# Patient Record
Sex: FEMALE | Race: WHITE | Employment: OTHER | URBAN - METROPOLITAN AREA
[De-identification: names, ages, dates, MRNs, and addresses within clinical notes are randomized per-mention and may not be internally consistent; named-entity substitution may affect disease eponyms.]

---

## 2017-01-17 ENCOUNTER — OFFICE VISIT (OUTPATIENT)
Dept: FAMILY MEDICINE CLINIC | Age: 72
End: 2017-01-17

## 2017-01-17 VITALS
WEIGHT: 136 LBS | DIASTOLIC BLOOD PRESSURE: 83 MMHG | HEIGHT: 64 IN | TEMPERATURE: 96 F | BODY MASS INDEX: 23.22 KG/M2 | OXYGEN SATURATION: 93 % | RESPIRATION RATE: 16 BRPM | HEART RATE: 96 BPM | SYSTOLIC BLOOD PRESSURE: 134 MMHG

## 2017-01-17 DIAGNOSIS — F41.9 CHRONIC ANXIETY: ICD-10-CM

## 2017-01-17 DIAGNOSIS — J44.9 CHRONIC OBSTRUCTIVE PULMONARY DISEASE, UNSPECIFIED COPD TYPE (HCC): Primary | ICD-10-CM

## 2017-01-17 RX ORDER — HYDROCODONE POLISTIREX AND CHLORPHENIRAMINE POLISTIREX 10; 8 MG/5ML; MG/5ML
5 SUSPENSION, EXTENDED RELEASE ORAL
Qty: 200 ML | Refills: 0 | Status: SHIPPED | OUTPATIENT
Start: 2017-01-17 | End: 2017-03-31 | Stop reason: SDUPTHER

## 2017-01-17 NOTE — PROGRESS NOTES
HISTORY OF PRESENT ILLNESS  Vanessa Salamanca is a 70 y.o. female. HPI  Copd stable  Chronic anxiety stable  Chronic cough stable  C/o insomnia  Review of Systems   HENT: Positive for congestion. Respiratory: Positive for cough. Psychiatric/Behavioral: The patient is nervous/anxious. All other systems reviewed and are negative. Past Medical History   Diagnosis Date    Bronchiolitis     Cough     GERD (gastroesophageal reflux disease)      went for surgery for it    Hypertension     JRA (juvenile rheumatoid arthritis) ([facility] [address]        Current Outpatient Prescriptions:     albuterol-ipratropium (DUO-NEB) 2.5 mg-0.5 mg/3 ml nebu, 3 mL by Nebulization route every six (6) hours as needed. Use 1 vial t.i.d. Via nebulizer prn, Disp: 360 Nebule, Rfl: 1    chlorpheniramine-HYDROcodone (TUSSIONEX) 10-8 mg/5 mL suspension, Take 5 mL by mouth every twelve (12) hours as needed for Cough. Max Daily Amount: 10 mL., Disp: 200 mL, Rfl: 0    chlorpheniramine-HYDROcodone (TUSSIONEX) 10-8 mg/5 mL suspension, Take 5 mL by mouth every twelve (12) hours as needed for Cough. Max Daily Amount: 10 mL., Disp: 200 mL, Rfl: 0    clonazePAM (KLONOPIN) 0.5 mg tablet, Take 1 Tab by mouth two (2) times a day. Max Daily Amount: 1 mg., Disp: 30 Tab, Rfl: 0    cholecalciferol (VITAMIN D3) 1,000 unit cap, Take  by mouth two (2) times a day., Disp: , Rfl:     LORazepam (ATIVAN) 1 mg tablet, 1 every 8 hours for breakthrough anxiety, Disp: 40 Tab, Rfl: 0    ammonium lactate (LAC-HYDRIN) 12 % lotion, rub in to affected area well twice a day as needed, Disp: 400 mL, Rfl: 3    budesonide (PULMICORT) 0.5 mg/2 mL nbsp, 2 mL by Nebulization route two (2) times a day. (Patient taking differently: 500 mcg by Nebulization route two (2) times daily as needed.), Disp: 60 Each, Rfl: 3    betamethasone dipropionate (DIPROSONE) 0.05 % topical cream, Apply  to affected area two (2) times a day.  (Patient taking differently: Apply  to affected area as needed.), Disp: 30 g, Rfl: 3    Saliva Substitution Combo No.3 spra, 1-2 sprays 3 times daily as directed, Disp: 40 mL, Rfl: 3    oxyCODONE IR (ROXICODONE) 10 mg tab immediate release tablet, Take 20 mg by mouth every four (4) hours as needed. , Disp: , Rfl:     methylPREDNISolone (MEDROL DOSEPACK) 4 mg tablet, Take as directed, Disp: 1 Dose Pack, Rfl: 0    guaiFENesin (MUCINEX) 1,200 mg Ta12 ER tablet, Take 1 Tab by mouth two (2) times a day., Disp: 20 Tab, Rfl: 0    desloratadine (CLARINEX) 5 mg tablet, Take 1 Tab by mouth daily. , Disp: 30 Tab, Rfl: 0    metoprolol tartrate (LOPRESSOR) 25 mg tablet, TAKE 1 TABLET BY MOUTH TWICE DAILY, Disp: 180 Tab, Rfl: 0    erythromycin 500 mg tablet, Take 150 mg by mouth three (3) times daily. , Disp: , Rfl:     melatonin 3 mg tablet, Take 3 mg by mouth nightly., Disp: , Rfl:     hydrOXYzine (ATARAX) 25 mg tablet, Take 1 Tab by mouth three (3) times daily as needed for Itching., Disp: 60 Tab, Rfl: 3    levothyroxine (SYNTHROID) 100 mcg tablet, Take 1 Tab by mouth Daily (before breakfast). , Disp: 90 Tab, Rfl: 3    estrogen, conjugated,-medroxyPROGESTERone (PREMPRO) 0.625-2.5 mg per tablet, Take 1 Tab by mouth daily. , Disp: 90 Tab, Rfl: 1    Calcium Carbonate-Vit D3-Min (CALTRATE 600+D PLUS MINERALS) 600 mg calcium- 400 unit Tab, Take  by mouth daily. , Disp: , Rfl:     multivitamins-minerals-lutein (CENTRUM SILVER) Tab, Take  by mouth daily. , Disp: , Rfl:   Visit Vitals    /83 (BP 1 Location: Right arm, BP Patient Position: Sitting)    Pulse 96    Temp 96 °F (35.6 °C) (Oral)    Resp 16    Ht 5' 4\" (1.626 m)    Wt 136 lb (61.7 kg)    SpO2 93%    BMI 23.34 kg/m2         Physical Exam   Constitutional: She appears well-developed and well-nourished. No distress. Pulmonary/Chest: Effort normal. No respiratory distress. She has wheezes. She has no rales. She exhibits no tenderness. Skin: She is not diaphoretic.    Psychiatric: She has a normal mood and affect. Her behavior is normal. Judgment and thought content normal.   Vitals reviewed.       ASSESSMENT and PLAN  copd   Chronic anxiety  Plan  Refills  D/w psych  Recheck in 1 month

## 2017-01-17 NOTE — PROGRESS NOTES
1. Have you been to the ER, urgent care clinic since your last visit? Hospitalized since your last visit? No   2. Have you seen or consulted any other health care providers outside of the [address] [place] [address] since your last visit? Include any pap smears or colon screening.    Yes, Dr. Nikkie Anderson hearing exam,  psychiatry  Dec 2016, upcoming ortho Feb 2017, Dr. Heather Mckeon recently

## 2017-01-17 NOTE — MR AVS SNAPSHOT
Visit Information Date & Time Provider Department Dept. Phone Encounter #  
 1/17/2017  1:30 PM Isabella Mina, 3 [facility] [phone] [id number] Follow-up Instructions Return in about 1 month (around 2/17/2017). Follow-up and Disposition History Upcoming Health Maintenance Date Due Hepatitis C Screening 1945 COLONOSCOPY 5/2/1963 DTaP/Tdap/Td series (1 - Tdap) 5/2/1966 BREAST CANCER SCRN MAMMOGRAM 5/2/1995 ZOSTER VACCINE AGE 60> 5/2/2005 GLAUCOMA SCREENING Q2Y 4/5/2014 MEDICARE YEARLY EXAM 6/24/2016 Pneumococcal 65+ Low/Medium Risk (2 of 2 - PPSV23) 10/31/2017 Allergies as of 1/17/2017  Review Complete On: 1/17/2017 By: Isabella Mina MD  
  
 Severity Noted Reaction Type Reactions Amoxicillin  08/23/2010    Unknown (comments) Avelox [Moxifloxacin]  08/23/2010    Unknown (comments) Celexa [Citalopram]  08/23/2010    Unknown (comments) Ciprofloxacin  08/23/2010    Unknown (comments) Doxycycline  01/17/2017    Nausea and Vomiting Levaquin [Levofloxacin]  08/23/2010    Unknown (comments) Paxil [Paroxetine Hcl]  08/23/2010    Unknown (comments) Sulfa (Sulfonamide Antibiotics)  08/23/2010    Unknown (comments) Joints ache Tequin [Gatifloxacin]  08/23/2010    Unknown (comments) Current Immunizations  Reviewed on 10/31/2016 Name Date Influenza High Dose Vaccine PF 10/31/2016  2:06 PM, 10/20/2015 Influenza Vaccine 10/22/2013 Influenza Vaccine (Quad) 11/24/2014 12:17 PM  
 Influenza Vaccine Split 11/16/2011 Not reviewed this visit You Were Diagnosed With   
  
 Codes Comments Chronic obstructive pulmonary disease, unspecified COPD type ([facility]    -  Primary ICD-10-CM: J44.9 ICD-9-CM: 686 Chronic anxiety     ICD-10-CM: F41.9 ICD-9-CM: 300.00 Normal body mass index (BMI)     ICD-10-CM: Z78.9 ICD-9-CM: V49.89 Vitals BP Pulse Temp Resp Height(growth percentile) Weight(growth percentile) 134/83 (BP 1 Location: Right arm, BP Patient Position: Sitting) 96 96 °F (35.6 °C) (Oral) 16 5' 4\" (1.626 m) 136 lb (61.7 kg) SpO2 BMI OB Status Smoking Status 93% 23.34 kg/m2 Postmenopausal Never Smoker Vitals History BMI and BSA Data Body Mass Index Body Surface Area  
 23.34 kg/m 2 1.67 m 2 Preferred Pharmacy Pharmacy Name Phone [facility] [address], [address] [phone] Your Updated Medication List  
  
   
This list is accurate as of: 1/17/17  1:47 PM.  Always use your most recent med list.  
  
  
  
  
 albuterol-ipratropium 2.5 mg-0.5 mg/3 ml Nebu Commonly known as:  DUO-NEB  
3 mL by Nebulization route every six (6) hours as needed. Use 1 vial t.i.d. Via nebulizer prn  
  
 ammonium lactate 12 % lotion Commonly known as:  LAC-HYDRIN  
rub in to affected area well twice a day as needed  
  
 betamethasone dipropionate 0.05 % topical cream  
Commonly known as:  Miguel Alvarez Apply  to affected area two (2) times a day. budesonide 0.5 mg/2 mL Nbsp Commonly known as:  PULMICORT  
2 mL by Nebulization route two (2) times a day. CALTRATE 600+D PLUS MINERALS 600 mg calcium- 400 unit Tab Generic drug:  Calcium Carbonate-Vit D3-Min Take  by mouth daily. CENTRUM SILVER Tab tablet Generic drug:  multivitamins-minerals-lutein Take  by mouth daily. * chlorpheniramine-HYDROcodone 10-8 mg/5 mL suspension Commonly known as:  Lisa Favorite Take 5 mL by mouth every twelve (12) hours as needed for Cough. Max Daily Amount: 10 mL. * chlorpheniramine-HYDROcodone 10-8 mg/5 mL suspension Commonly known as:  Lisa Favorite Take 5 mL by mouth every twelve (12) hours as needed for Cough. Max Daily Amount: 10 mL. clonazePAM 0.5 mg tablet Commonly known as:  Jenn Ivory  
 Take 1 Tab by mouth two (2) times a day. Max Daily Amount: 1 mg.  
  
 desloratadine 5 mg tablet Commonly known as:  CLARINEX Take 1 Tab by mouth daily. erythromycin 500 mg tablet Take 150 mg by mouth three (3) times daily. estrogen (conjugated)-medroxyPROGESTERone 0.625-2.5 mg per tablet Commonly known as:  Gib Farhan Take 1 Tab by mouth daily. guaiFENesin 1,200 mg Ta12 ER tablet Commonly known as:  [address] Take 1 Tab by mouth two (2) times a day.  
  
 hydrOXYzine HCl 25 mg tablet Commonly known as:  ATARAX Take 1 Tab by mouth three (3) times daily as needed for Itching. levothyroxine 100 mcg tablet Commonly known as:  SYNTHROID Take 1 Tab by mouth Daily (before breakfast). LORazepam 1 mg tablet Commonly known as:  ATIVAN  
1 every 8 hours for breakthrough anxiety  
  
 melatonin 3 mg tablet Take 3 mg by mouth nightly. methylPREDNISolone 4 mg tablet Commonly known as:  Dequan Labella Take as directed  
  
 metoprolol tartrate 25 mg tablet Commonly known as:  LOPRESSOR  
TAKE 1 TABLET BY MOUTH TWICE DAILY  
  
 oxyCODONE IR 10 mg Tab immediate release tablet Commonly known as:  Fina Canal Take 20 mg by mouth every four (4) hours as needed. Saliva Substitution Combo No.3 Spra 1-2 sprays 3 times daily as directed VITAMIN D3 1,000 unit Cap Generic drug:  cholecalciferol Take  by mouth two (2) times a day. * Notice: This list has 2 medication(s) that are the same as other medications prescribed for you. Read the directions carefully, and ask your doctor or other care provider to review them with you. Prescriptions Printed Refills  
 chlorpheniramine-HYDROcodone (TUSSIONEX) 10-8 mg/5 mL suspension 0 Sig: Take 5 mL by mouth every twelve (12) hours as needed for Cough. Max Daily Amount: 10 mL. Class: Print Route: Oral  
  
Follow-up Instructions Return in about 1 month (around 2/17/2017). Introducing \[facility]\"" & HEALTH SERVICES! Dear Anitra Bergman: Thank you for requesting a ImpactFlo account. Our records indicate that you already have an active ImpactFlo account. You can access your account anytime at [email or web address]. Pow[employer] Did you know that you can access your hospital and ER discharge instructions at any time in [employer]Flo? You can also review all of your test results from your hospital stay or ER visit. Additional Information If you have questions, please visit the Frequently Asked Questions section of the ImpactFlo website at [email or web address]. [employer]/. Remember, ImpactFlo is NOT to be used for urgent needs. For medical emergencies, dial 911. Now available from your iPhone and Android! Please provide this summary of care documentation to your next provider. Your primary care clinician is listed as Teresa Briggs. If you have any questions after today's visit, please call [phone].

## 2017-01-23 NOTE — TELEPHONE ENCOUNTER
Pt is requesting medication for dry mouth. She did not remember the name of the medication but states Dr. Jocelin Hikcey has previously rx for her.  Pt preferred pharmacy [facility] as [facility] [address] [facility] [facility]

## 2017-02-22 ENCOUNTER — OFFICE VISIT (OUTPATIENT)
Dept: FAMILY MEDICINE CLINIC | Age: 72
End: 2017-02-22

## 2017-02-22 VITALS
OXYGEN SATURATION: 93 % | DIASTOLIC BLOOD PRESSURE: 72 MMHG | TEMPERATURE: 97.1 F | HEART RATE: 78 BPM | WEIGHT: 132.4 LBS | RESPIRATION RATE: 20 BRPM | SYSTOLIC BLOOD PRESSURE: 121 MMHG | BODY MASS INDEX: 22.61 KG/M2 | HEIGHT: 64 IN

## 2017-02-22 DIAGNOSIS — J44.9 CHRONIC OBSTRUCTIVE PULMONARY DISEASE, UNSPECIFIED COPD TYPE (HCC): Primary | ICD-10-CM

## 2017-02-22 DIAGNOSIS — R05.3 CHRONIC COUGH: ICD-10-CM

## 2017-02-22 RX ORDER — HYDROMORPHONE HYDROCHLORIDE 5 MG/5ML
SOLUTION ORAL
Qty: 8 ML | Refills: 0 | Status: SHIPPED | OUTPATIENT
Start: 2017-02-22 | End: 2017-11-10 | Stop reason: ALTCHOICE

## 2017-02-22 NOTE — PROGRESS NOTES
Grayson Reis is a 70 y.o. female here today for 1 month follow-up        1. Have you been to the ER, urgent care clinic since your last visit? Hospitalized since your last visit? No    2. Have you seen or consulted any other health care providers outside of the [facility] since your last visit? Include any pap smears or colon screening.  Yes Where: Pyschiatrist

## 2017-02-22 NOTE — PROGRESS NOTES
HISTORY OF PRESENT ILLNESS  Milady Baron is a 70 y.o. female. HPI  Copd stable  Chronic cough stable  Due for cataract surgery  Review of Systems   Respiratory: Positive for cough. All other systems reviewed and are negative. Past Medical History:   Diagnosis Date    Bronchiolitis     Cough     GERD (gastroesophageal reflux disease)     went for surgery for it    Hypertension     JRA (juvenile rheumatoid arthritis) ([facility] [address]      Current Outpatient Prescriptions on File Prior to Visit   Medication Sig Dispense Refill    chlorpheniramine-HYDROcodone (TUSSIONEX) 10-8 mg/5 mL suspension Take 5 mL by mouth every twelve (12) hours as needed for Cough. Max Daily Amount: 10 mL. 200 mL 0    albuterol-ipratropium (DUO-NEB) 2.5 mg-0.5 mg/3 ml nebu 3 mL by Nebulization route every six (6) hours as needed. Use 1 vial t.i.d. Via nebulizer prn 360 Nebule 1    chlorpheniramine-HYDROcodone (TUSSIONEX) 10-8 mg/5 mL suspension Take 5 mL by mouth every twelve (12) hours as needed for Cough. Max Daily Amount: 10 mL. 200 mL 0    clonazePAM (KLONOPIN) 0.5 mg tablet Take 1 Tab by mouth two (2) times a day. Max Daily Amount: 1 mg. 30 Tab 0    metoprolol tartrate (LOPRESSOR) 25 mg tablet TAKE 1 TABLET BY MOUTH TWICE DAILY 180 Tab 0    cholecalciferol (VITAMIN D3) 1,000 unit cap Take  by mouth two (2) times a day.  hydrOXYzine (ATARAX) 25 mg tablet Take 1 Tab by mouth three (3) times daily as needed for Itching. 60 Tab 3    ammonium lactate (LAC-HYDRIN) 12 % lotion rub in to affected area well twice a day as needed 400 mL 3    budesonide (PULMICORT) 0.5 mg/2 mL nbsp 2 mL by Nebulization route two (2) times a day. (Patient taking differently: 500 mcg by Nebulization route two (2) times daily as needed.) 60 Each 3    levothyroxine (SYNTHROID) 100 mcg tablet Take 1 Tab by mouth Daily (before breakfast). 90 Tab 3    betamethasone dipropionate (DIPROSONE) 0.05 % topical cream Apply  to affected area two (2) times a day. (Patient taking differently: Apply  to affected area as needed.) 30 g 3    oxyCODONE IR (ROXICODONE) 10 mg tab immediate release tablet Take 20 mg by mouth every four (4) hours as needed.  Saliva Substitution Combo No.3 spra 1-2 sprays 3 times daily as directed 40 mL 3    methylPREDNISolone (MEDROL DOSEPACK) 4 mg tablet Take as directed 1 Dose Pack 0    guaiFENesin (MUCINEX) 1,200 mg Ta12 ER tablet Take 1 Tab by mouth two (2) times a day. 20 Tab 0    desloratadine (CLARINEX) 5 mg tablet Take 1 Tab by mouth daily. 30 Tab 0    erythromycin 500 mg tablet Take 150 mg by mouth three (3) times daily.  melatonin 3 mg tablet Take 3 mg by mouth nightly.  LORazepam (ATIVAN) 1 mg tablet 1 every 8 hours for breakthrough anxiety 40 Tab 0    estrogen, conjugated,-medroxyPROGESTERone (PREMPRO) 0.625-2.5 mg per tablet Take 1 Tab by mouth daily. 90 Tab 1    Calcium Carbonate-Vit D3-Min (CALTRATE 600+D PLUS MINERALS) 600 mg calcium- 400 unit Tab Take  by mouth daily.  multivitamins-minerals-lutein (CENTRUM SILVER) Tab Take  by mouth daily. No current facility-administered medications on file prior to visit. Visit Vitals    /72    Pulse 78    Temp 97.1 °F (36.2 °C)    Resp 20    Ht 5' 4\" (1.626 m)    Wt 132 lb 6.4 oz (60.1 kg)    SpO2 93%    BMI 22.73 kg/m2         Physical Exam   Constitutional: She appears well-developed and well-nourished. No distress. Pulmonary/Chest: Effort normal. No respiratory distress. She has wheezes. She has no rales. She exhibits no tenderness. Skin: She is not diaphoretic. Vitals reviewed.       ASSESSMENT and PLAN  copd   Chronic cough  Plan  Dilaudid liquid  D/w dr Clement Abreu

## 2017-02-22 NOTE — MR AVS SNAPSHOT
Visit Information Date & Time Provider Department Dept. Phone Encounter #  
 2/22/2017 11:00 AM Hemal Pires MD [address] [place] [address] [phone] [id number] Follow-up Instructions Return in about 1 month (around 3/22/2017). Follow-up and Disposition History Upcoming Health Maintenance Date Due Hepatitis C Screening 1945 COLONOSCOPY 5/2/1963 DTaP/Tdap/Td series (1 - Tdap) 5/2/1966 BREAST CANCER SCRN MAMMOGRAM 5/2/1995 ZOSTER VACCINE AGE 60> 5/2/2005 GLAUCOMA SCREENING Q2Y 4/5/2014 MEDICARE YEARLY EXAM 6/24/2016 Pneumococcal 65+ Low/Medium Risk (2 of 2 - PPSV23) 10/31/2017 Allergies as of 2/22/2017  Review Complete On: 2/22/2017 By: Hemal Pires MD  
  
 Severity Noted Reaction Type Reactions Amoxicillin  08/23/2010    Unknown (comments) Avelox [Moxifloxacin]  08/23/2010    Unknown (comments) Celexa [Citalopram]  08/23/2010    Unknown (comments) Ciprofloxacin  08/23/2010    Unknown (comments) Doxycycline  01/17/2017    Nausea and Vomiting Levaquin [Levofloxacin]  08/23/2010    Unknown (comments) Paxil [Paroxetine Hcl]  08/23/2010    Unknown (comments) Sulfa (Sulfonamide Antibiotics)  08/23/2010    Unknown (comments) Joints ache Tequin [Gatifloxacin]  08/23/2010    Unknown (comments) Current Immunizations  Reviewed on 10/31/2016 Name Date Influenza High Dose Vaccine PF 10/31/2016  2:06 PM, 10/20/2015 Influenza Vaccine 10/22/2013 Influenza Vaccine (Quad) 11/24/2014 12:17 PM  
 Influenza Vaccine Split 11/16/2011 Not reviewed this visit You Were Diagnosed With   
  
 Codes Comments Chronic obstructive pulmonary disease, unspecified [facility]    -  Primary ICD-10-CM: J44.9 ICD-9-CM: 148 BMI less than 19,adult     ICD-10-CM: Z68.1 ICD-9-CM: V85.0 Chronic cough     ICD-10-CM: R05 ICD-9-CM: 295. 2 Vitals BP  
  
  
  
  
  
 121/72 Vitals History BMI and BSA Data Body Mass Index Body Surface Area  
 22.73 kg/m 2 1.65 m 2 Preferred Pharmacy Pharmacy Name Phone [facility] [address], [address] [phone] Your Updated Medication List  
  
   
This list is accurate as of: 2/22/17 12:10 PM.  Always use your most recent med list.  
  
  
  
  
 albuterol-ipratropium 2.5 mg-0.5 mg/3 ml Nebu Commonly known as:  DUO-NEB  
3 mL by Nebulization route every six (6) hours as needed. Use 1 vial t.i.d. Via nebulizer prn  
  
 ammonium lactate 12 % lotion Commonly known as:  LAC-HYDRIN  
rub in to affected area well twice a day as needed  
  
 betamethasone dipropionate 0.05 % topical cream  
Commonly known as:  Seema Romano Apply  to affected area two (2) times a day. budesonide 0.5 mg/2 mL Nbsp Commonly known as:  PULMICORT  
2 mL by Nebulization route two (2) times a day. CALTRATE 600+D PLUS MINERALS 600 mg calcium- 400 unit Tab Generic drug:  Calcium Carbonate-Vit D3-Min Take  by mouth daily. CENTRUM SILVER Tab tablet Generic drug:  multivitamins-minerals-lutein Take  by mouth daily. * chlorpheniramine-HYDROcodone 10-8 mg/5 mL suspension Commonly known as:  Milon Choudhury Take 5 mL by mouth every twelve (12) hours as needed for Cough. Max Daily Amount: 10 mL. * chlorpheniramine-HYDROcodone 10-8 mg/5 mL suspension Commonly known as:  Milon Choudhury Take 5 mL by mouth every twelve (12) hours as needed for Cough. Max Daily Amount: 10 mL. clonazePAM 0.5 mg tablet Commonly known as:  Dillon Aw Take 1 Tab by mouth two (2) times a day. Max Daily Amount: 1 mg.  
  
 desloratadine 5 mg tablet Commonly known as:  CLARINEX Take 1 Tab by mouth daily. erythromycin 500 mg tablet Take 150 mg by mouth three (3) times daily. estrogen (conjugated)-medroxyPROGESTERone 0.625-2.5 mg per tablet Commonly known as:  Adrian Kasey Take 1 Tab by mouth daily. guaiFENesin 1,200 mg Ta12 ER tablet Commonly known as:  Deni & Deni Take 1 Tab by mouth two (2) times a day. HYDROmorphone 1 mg/mL Liqd oral solution Commonly known as:  DILAUDID  
4 ml 1 hour before surgery as directed  
  
 hydrOXYzine HCl 25 mg tablet Commonly known as:  ATARAX Take 1 Tab by mouth three (3) times daily as needed for Itching. levothyroxine 100 mcg tablet Commonly known as:  SYNTHROID Take 1 Tab by mouth Daily (before breakfast). LORazepam 1 mg tablet Commonly known as:  ATIVAN  
1 every 8 hours for breakthrough anxiety  
  
 melatonin 3 mg tablet Take 3 mg by mouth nightly. methylPREDNISolone 4 mg tablet Commonly known as:  Cleta Loss Take as directed  
  
 metoprolol tartrate 25 mg tablet Commonly known as:  LOPRESSOR  
TAKE 1 TABLET BY MOUTH TWICE DAILY  
  
 oxyCODONE IR 10 mg Tab immediate release tablet Commonly known as:  Priscilla Izabella Take 20 mg by mouth every four (4) hours as needed. Saliva Substitution Combo No.3 Spra 1-2 sprays 3 times daily as directed VITAMIN D3 1,000 unit Cap Generic drug:  cholecalciferol Take  by mouth two (2) times a day. * Notice: This list has 2 medication(s) that are the same as other medications prescribed for you. Read the directions carefully, and ask your doctor or other care provider to review them with you. Prescriptions Printed Refills HYDROmorphone (DILAUDID) 1 mg/mL liqd oral solution 0 Si ml 1 hour before surgery as directed Class: Print Follow-up Instructions Return in about 1 month (around 3/22/2017). Introducing [facility] & HEALTH SERVICES! Dear Toni Wang: Thank you for requesting a M[employer]reet account. Our records indicate that you already have an active Mogreet account. You can access your account anytime at [email or web address] [email or web address] Did you know that you can access your hospital and ER discharge instructions at any time in flaregames? You can also review all of your test results from your hospital stay or ER visit. Additional Information If you have questions, please visit the Frequently Asked Questions section of the flaregames website at [email or web address] Remember, flaregames is NOT to be used for urgent needs. For medical emergencies, dial 911. Now available from your iPhone and Android! Please provide this summary of care documentation to your next provider. Your primary care clinician is listed as Yohan Feliz. If you have any questions after today's visit, please call [phone].

## 2017-02-27 RX ORDER — HYDROCODONE POLISTIREX AND CHLORPHENIRAMINE POLISTIREX 10; 8 MG/5ML; MG/5ML
5 SUSPENSION, EXTENDED RELEASE ORAL
Qty: 200 ML | Refills: 0 | Status: SHIPPED | OUTPATIENT
Start: 2017-02-27 | End: 2017-06-05 | Stop reason: SDUPTHER

## 2017-02-28 ENCOUNTER — TELEPHONE (OUTPATIENT)
Dept: FAMILY MEDICINE CLINIC | Age: 72
End: 2017-02-28

## 2017-03-16 RX ORDER — METOPROLOL TARTRATE 25 MG/1
TABLET, FILM COATED ORAL
Qty: 180 TAB | Refills: 0 | Status: SHIPPED | OUTPATIENT
Start: 2017-03-16 | End: 2018-03-28 | Stop reason: SDUPTHER

## 2017-03-31 ENCOUNTER — TELEPHONE (OUTPATIENT)
Dept: FAMILY MEDICINE CLINIC | Age: 72
End: 2017-03-31

## 2017-03-31 DIAGNOSIS — I82.629 ARM DVT (DEEP VENOUS THROMBOEMBOLISM), ACUTE, UNSPECIFIED LATERALITY (HCC): Primary | ICD-10-CM

## 2017-03-31 LAB
INR, EXTERNAL: 2.9
PT, EXTERNAL: 0

## 2017-03-31 NOTE — TELEPHONE ENCOUNTER
Spoke with Dr. Cesar Mcmanus he states to informed them to stay on 5 mg and recheck Monday and have results faxed to office. Called and spoke with April and informed of directions. She is requesting order be faxed over to [address] [place] [address] Jennifer for them to check INR for patient. I did give her a verbal for now however will need paper order send over next week there fax # [phone] please back date or to 3/30/17 since they did test yesterday.

## 2017-03-31 NOTE — TELEPHONE ENCOUNTER
Received call back from April w/ [address] # [phone]    Patient went to ER on Monday 3/27/17 and was put on coumadin 5 mg that was started Monday 3/27/17 for blood clot on right arm at picc line, and they are not sure who is going to monitor the INR nor do they know the therapeutic  range. Yesterady Thursday 3/30/17 INR was 2.9 which she had them hold coumadin today till she found out what to do.

## 2017-03-31 NOTE — TELEPHONE ENCOUNTER
Kalee Holt from [facility] called during staff meeting and left message with answering service. Please call back at [phone] in regards to the pt.

## 2017-04-03 LAB — INR, EXTERNAL: 9

## 2017-04-03 RX ORDER — HYDROCODONE POLISTIREX AND CHLORPHENIRAMINE POLISTIREX 10; 8 MG/5ML; MG/5ML
5 SUSPENSION, EXTENDED RELEASE ORAL
Qty: 200 ML | Refills: 0 | Status: SHIPPED | OUTPATIENT
Start: 2017-04-03 | End: 2017-04-14 | Stop reason: SDUPTHER

## 2017-04-04 ENCOUNTER — TELEPHONE ANTICOAG (OUTPATIENT)
Dept: FAMILY MEDICINE CLINIC | Age: 72
End: 2017-04-04

## 2017-04-04 ENCOUNTER — TELEPHONE (OUTPATIENT)
Dept: FAMILY MEDICINE CLINIC | Age: 72
End: 2017-04-04

## 2017-04-04 LAB
INR, EXTERNAL: 6.77
INR, EXTERNAL: 9
PT, EXTERNAL: 0
PT, EXTERNAL: 77.6

## 2017-04-04 NOTE — PROGRESS NOTES
Spoke with April and told her to have patient hold coumadin until further notice and recheck  INR Friday she voiced understanding and said  wanted the INR rehecked today to confirm level . April Also wanted you to know that her creatinine has been out of range and this is also been drawn for recheck .  The patient just reported to April today that she fell Saturday nurse said she has no bruising to area and needs a written order for here to check patient's INR's fax to [phone]

## 2017-04-04 NOTE — TELEPHONE ENCOUNTER
Rachel with personal touch called in INR results. INR 6.77  Coumadin 5mg daily     Jose M Trivedi is a 70 y.o. female who presents today for Anticoagulation monitoring. Indication:   INR Goal: 2.5-3.5. Current dose:  Coumadin 5mg daily. Missed Coumadin Doses:  None  Medication Changes:  no  Dietary Changes:  no    Symptoms: taking coumadin appropriately without any bleeding. Latest INRs:  Lab Results   Component Value Date/Time    INR, External 6.77 04/04/2017 12:46 PM    INR, External 9.0 04/03/2017    INR, External 9.0 04/03/2017        New Coumadin dose:.the following changes are made - defer to dr Carmen Naranjo.

## 2017-04-04 NOTE — PROGRESS NOTES
April called with patients INR  Of 9.0 taken last night unable to  Get in contact with anyone here patient is taking 5 mg daily she missed a dose Friday because she didn't get the directions in time, also they had her hold coumadin last night patient is on vancomycin which has been decreased they will be checking INR again today to confirm level please advise on directions [phone]     Concetta Boyer is a 70 y.o. female who presents today for Anticoagulation monitoring. Indication:DVT arm near Picc line   INR Goal: 2-3  Current dose:  Coumadin 5 mg daily. Missed Coumadin Doses:  Over one week ago -  Missed Friday's dose   Medication Changes:  no  Dietary Changes:  no    Symptoms: taking coumadin appropriately without any bleeding. Latest INRs:  Lab Results   Component Value Date/Time    INR, External 9.0 04/03/2017    INR, External 9.0 04/03/2017    INR, External 2.9 03/30/2017        New Coumadin dose:.current treatment plan is effective, no change in therapy, the following changes are made  Hold until further notice .     Next check to be scheduled for 4/7/17 Friday

## 2017-04-07 ENCOUNTER — TELEPHONE (OUTPATIENT)
Dept: FAMILY MEDICINE CLINIC | Age: 72
End: 2017-04-07

## 2017-04-07 LAB — INR, EXTERNAL: 2.6

## 2017-04-07 RX ORDER — WARFARIN 1 MG/1
1 TABLET ORAL DAILY
Qty: 30 TAB | Refills: 3 | Status: SHIPPED | OUTPATIENT
Start: 2017-04-07 | End: 2017-11-10 | Stop reason: ALTCHOICE

## 2017-04-07 NOTE — TELEPHONE ENCOUNTER
Nilam Keita is a 70 y.o. female who presents today for Anticoagulation monitoring. Indication: DVT  INR Goal: 2.5-3.5. Current dose:  Not taken any since 4-2-17  Missed Coumadin Doses:  Not taken any since 4-2-17  Medication Changes:  no  Dietary Changes:  no    Symptoms: taking coumadin appropriately without any bleeding. Latest INRs:  Lab Results   Component Value Date/Time    INR, External 2.6 04/07/2017 08:36 AM    INR, External 6.77 04/04/2017 12:46 PM    INR, External 9.0 04/03/2017    INR, External 9.0 04/03/2017        New Coumadin dose:.the following changes are made - defer to dr Rachel Fleming.

## 2017-04-10 LAB
INR, EXTERNAL: 1.4
PT, EXTERNAL: 15.6

## 2017-04-11 ENCOUNTER — TELEPHONE (OUTPATIENT)
Dept: FAMILY MEDICINE CLINIC | Age: 72
End: 2017-04-11

## 2017-04-11 NOTE — TELEPHONE ENCOUNTER
Cornelia Mendez is a 70 y.o. female who presents today for Anticoagulation monitoring. Indication: DVT  INR Goal: 2.5-3.5. Current dose:  Coumadin 1mg daily. Missed Coumadin Doses:  None  Medication Changes:  no  Dietary Changes:  no    Symptoms: taking coumadin appropriately without any bleeding. Latest INRs:  Lab Results   Component Value Date/Time    INR, External 1.4 04/10/2017 11:22 AM    INR, External 2.6 04/07/2017 08:36 AM    INR, External 6.77 04/04/2017 12:46 PM        New Coumadin dose:.the following changes are made - defer to dr Fatemeh Rajan .

## 2017-04-13 LAB
CREATININE, EXTERNAL: 1.1
INR, EXTERNAL: 1.3
PT, EXTERNAL: 14.2

## 2017-04-14 ENCOUNTER — OFFICE VISIT (OUTPATIENT)
Dept: FAMILY MEDICINE CLINIC | Age: 72
End: 2017-04-14

## 2017-04-14 ENCOUNTER — TELEPHONE (OUTPATIENT)
Dept: FAMILY MEDICINE CLINIC | Age: 72
End: 2017-04-14

## 2017-04-14 VITALS
SYSTOLIC BLOOD PRESSURE: 116 MMHG | HEIGHT: 64 IN | BODY MASS INDEX: 22.2 KG/M2 | TEMPERATURE: 97.9 F | OXYGEN SATURATION: 95 % | HEART RATE: 76 BPM | WEIGHT: 130 LBS | RESPIRATION RATE: 20 BRPM | DIASTOLIC BLOOD PRESSURE: 49 MMHG

## 2017-04-14 DIAGNOSIS — R05.3 CHRONIC COUGH: ICD-10-CM

## 2017-04-14 DIAGNOSIS — I82.621 ARM DVT (DEEP VENOUS THROMBOEMBOLISM), ACUTE, RIGHT (HCC): Primary | ICD-10-CM

## 2017-04-14 RX ORDER — HYDROCODONE POLISTIREX AND CHLORPHENIRAMINE POLISTIREX 10; 8 MG/5ML; MG/5ML
5 SUSPENSION, EXTENDED RELEASE ORAL
Qty: 200 ML | Refills: 0 | Status: SHIPPED | OUTPATIENT
Start: 2017-04-14 | End: 2017-07-05 | Stop reason: SDUPTHER

## 2017-04-14 RX ORDER — VANCOMYCIN HYDROCHLORIDE 750 MG/150ML
INJECTION, SOLUTION INTRAVENOUS
COMMUNITY
End: 2017-11-10 | Stop reason: ALTCHOICE

## 2017-04-14 NOTE — MR AVS SNAPSHOT
Visit Information Date & Time Provider Department Dept. Phone Encounter #  
 4/14/2017  2:00 PM Shanice Posey MD [address] [place] [address] [phone] [id number] Follow-up Instructions Return in about 2 weeks (around 4/28/2017). Follow-up and Disposition History Upcoming Health Maintenance Date Due Hepatitis C Screening 1945 COLONOSCOPY 5/2/1963 DTaP/Tdap/Td series (1 - Tdap) 5/2/1966 BREAST CANCER SCRN MAMMOGRAM 5/2/1995 ZOSTER VACCINE AGE 60> 5/2/2005 GLAUCOMA SCREENING Q2Y 4/5/2014 MEDICARE YEARLY EXAM 6/24/2016 Pneumococcal 65+ Low/Medium Risk (2 of 2 - PPSV23) 10/31/2017 Allergies as of 4/14/2017  Review Complete On: 4/14/2017 By: Shanice Posey MD  
  
 Severity Noted Reaction Type Reactions Amoxicillin  08/23/2010    Unknown (comments) Avelox [Moxifloxacin]  08/23/2010    Unknown (comments) Celexa [Citalopram]  08/23/2010    Unknown (comments) Ciprofloxacin  08/23/2010    Unknown (comments) Doxycycline  01/17/2017    Nausea and Vomiting Levaquin [Levofloxacin]  08/23/2010    Unknown (comments) Paxil [Paroxetine Hcl]  08/23/2010    Unknown (comments) Sulfa (Sulfonamide Antibiotics)  08/23/2010    Unknown (comments) Joints ache Tequin [Gatifloxacin]  08/23/2010    Unknown (comments) Current Immunizations  Reviewed on 10/31/2016 Name Date Influenza High Dose Vaccine PF 10/31/2016  2:06 PM, 10/20/2015 Influenza Vaccine 10/22/2013 Influenza Vaccine (Quad) 11/24/2014 12:17 PM  
 Influenza Vaccine Split 11/16/2011 Not reviewed this visit You Were Diagnosed With   
  
 Codes Comments Arm DVT (deep venous thromboembolism), acute, right    -  Primary ICD-10-CM: Y77.045 ICD-9-CM: 453.82 Chronic cough     ICD-10-CM: R05 ICD-9-CM: 558. 2 Vitals BP Pulse Temp Resp Height(growth percentile) Weight(growth percentile) 116/49 (BP 1 Location: Right arm, BP Patient Position: Sitting) 76 97.9 °F (36.6 °C) (Oral) 20 5' 4\" (1.626 m) 130 lb (59 kg) SpO2 BMI OB Status Smoking Status 95% 22.31 kg/m2 Postmenopausal Never Smoker BMI and BSA Data Body Mass Index Body Surface Area  
 22.31 kg/m 2 1.63 m 2 Preferred Pharmacy Pharmacy Name Phone [facility] [address], [address] [address] [phone] Your Updated Medication List  
  
   
This list is accurate as of: 4/14/17  2:33 PM.  Always use your most recent med list.  
  
  
  
  
 albuterol-ipratropium 2.5 mg-0.5 mg/3 ml Nebu Commonly known as:  DUO-NEB  
3 mL by Nebulization route every six (6) hours as needed. Use 1 vial t.i.d. Via nebulizer prn  
  
 ammonium lactate 12 % lotion Commonly known as:  LAC-HYDRIN  
rub in to affected area well twice a day as needed  
  
 betamethasone dipropionate 0.05 % topical cream  
Commonly known as:  Sonia Pimple Apply  to affected area two (2) times a day. budesonide 0.5 mg/2 mL Nbsp Commonly known as:  PULMICORT  
2 mL by Nebulization route two (2) times a day. CALTRATE 600+D PLUS MINERALS 600 mg calcium- 400 unit Tab Generic drug:  Calcium Carbonate-Vit D3-Min Take  by mouth daily. CENTRUM SILVER Tab tablet Generic drug:  multivitamins-minerals-lutein Take  by mouth daily. * chlorpheniramine-HYDROcodone 10-8 mg/5 mL suspension Commonly known as:  Hershall Manners Take 5 mL by mouth every twelve (12) hours as needed for Cough. Max Daily Amount: 10 mL. * chlorpheniramine-HYDROcodone 10-8 mg/5 mL suspension Commonly known as:  Hershall Manners Take 5 mL by mouth every twelve (12) hours as needed for Cough. Max Daily Amount: 10 mL. clonazePAM 0.5 mg tablet Commonly known as:  Shannan Medici Take 1 Tab by mouth two (2) times a day. Max Daily Amount: 1 mg.  
  
 desloratadine 5 mg tablet Commonly known as:  CLARINEX Take 1 Tab by mouth daily. erythromycin 500 mg tablet Take 150 mg by mouth three (3) times daily. estrogen (conjugated)-medroxyPROGESTERone 0.625-2.5 mg per tablet Commonly known as:  Bushra Ates Take 1 Tab by mouth daily. guaiFENesin 1,200 mg Ta12 ER tablet Commonly known as:  Deni & Deni Take 1 Tab by mouth two (2) times a day. HYDROmorphone 1 mg/mL Liqd oral solution Commonly known as:  DILAUDID  
4 ml 1 hour before surgery as directed  
  
 hydrOXYzine HCl 25 mg tablet Commonly known as:  ATARAX Take 1 Tab by mouth three (3) times daily as needed for Itching. levothyroxine 100 mcg tablet Commonly known as:  SYNTHROID Take 1 Tab by mouth Daily (before breakfast). LORazepam 1 mg tablet Commonly known as:  ATIVAN  
1 every 8 hours for breakthrough anxiety  
  
 melatonin 3 mg tablet Take 3 mg by mouth nightly. methylPREDNISolone 4 mg tablet Commonly known as:  Kerline  Take as directed  
  
 metoprolol tartrate 25 mg tablet Commonly known as:  LOPRESSOR  
TAKE 1 TABLET BY MOUTH TWICE DAILY  
  
 OTHER Test INR as directed I82.629  
  
 oxyCODONE IR 10 mg Tab immediate release tablet Commonly known as:  Antionette Rajeev Take 20 mg by mouth every four (4) hours as needed. Saliva Substitution Combo No.3 Spra 1-2 sprays 3 times daily as directed  
  
 vancomycin in 0.9% sodium Cl 750 mg/150 mL Pgbk  
by IntraVENous route. VITAMIN D3 1,000 unit Cap Generic drug:  cholecalciferol Take  by mouth two (2) times a day. warfarin 1 mg tablet Commonly known as:  COUMADIN Take 1 Tab by mouth daily. * Notice: This list has 2 medication(s) that are the same as other medications prescribed for you. Read the directions carefully, and ask your doctor or other care provider to review them with you. Prescriptions Printed Refills chlorpheniramine-HYDROcodone (TUSSIONEX) 10-8 mg/5 mL suspension 0 Sig: Take 5 mL by mouth every twelve (12) hours as needed for Cough. Max Daily Amount: 10 mL. Class: Print Route: Oral  
  
Follow-up Instructions Return in about 2 weeks (around 4/28/2017). Introducing [facility] SERVICES! Dear Noreen Martínez: Thank you for requesting a [employer] account. Our records indicate that you already have an active [employer] account. You can access your account anytime at [email or web address] Did you know that you can access your hospital and ER discharge instructions at any time in [employer]? You can also review all of your test results from your hospital stay or ER visit. Additional Information If you have questions, please visit the Frequently Asked Questions section of the [employer] website at [email or web address] Remember, [employer] is NOT to be used for urgent needs. For medical emergencies, dial 911. Now available from your iPhone and Android! Please provide this summary of care documentation to your next provider. Your primary care clinician is listed as Angie Last. If you have any questions after today's visit, please call [phone].

## 2017-04-14 NOTE — PROGRESS NOTES
Chief Complaint   Patient presents with   [facility] Follow Up     Pain scale 0/10      1. Have you been to the ER, urgent care clinic since your last visit? Hospitalized since your last visit? Yes Where: [facility]    2. Have you seen or consulted any other health care providers outside of the [address] [place] [address] since your last visit? Include any pap smears or colon screening.  Yes Where: Orthopedics

## 2017-04-14 NOTE — PROGRESS NOTES
HISTORY OF PRESENT ILLNESS  Mikie Acevedo is a 70 y.o. female. HPI  dvt UE stable  mrsa infection stable  Review of Systems   All other systems reviewed and are negative. Past Medical History:   Diagnosis Date    Bronchiolitis     Cough     GERD (gastroesophageal reflux disease)     went for surgery for it    Hypertension     JRA (juvenile rheumatoid arthritis) ([facility] [address]        Current Outpatient Prescriptions:     VANCOMYCIN/0.9 % SOD CHLORIDE (VANCOMYCIN IN 0.9% SODIUM CL) 750 mg/150 mL pgbk, by IntraVENous route., Disp: , Rfl:     warfarin (COUMADIN) 1 mg tablet, Take 1 Tab by mouth daily. , Disp: 30 Tab, Rfl: 3    OTHER, Test INR as directed I82.629, Disp: 1 Each, Rfl: 0    metoprolol tartrate (LOPRESSOR) 25 mg tablet, TAKE 1 TABLET BY MOUTH TWICE DAILY, Disp: 180 Tab, Rfl: 0    HYDROmorphone (DILAUDID) 1 mg/mL liqd oral solution, 4 ml 1 hour before surgery as directed, Disp: 8 mL, Rfl: 0    Saliva Substitution Combo No.3 spra, 1-2 sprays 3 times daily as directed, Disp: 40 mL, Rfl: 3    methylPREDNISolone (MEDROL DOSEPACK) 4 mg tablet, Take as directed, Disp: 1 Dose Pack, Rfl: 0    albuterol-ipratropium (DUO-NEB) 2.5 mg-0.5 mg/3 ml nebu, 3 mL by Nebulization route every six (6) hours as needed. Use 1 vial t.i.d. Via nebulizer prn, Disp: 360 Nebule, Rfl: 1    guaiFENesin (MUCINEX) 1,200 mg Ta12 ER tablet, Take 1 Tab by mouth two (2) times a day., Disp: 20 Tab, Rfl: 0    desloratadine (CLARINEX) 5 mg tablet, Take 1 Tab by mouth daily. , Disp: 30 Tab, Rfl: 0    clonazePAM (KLONOPIN) 0.5 mg tablet, Take 1 Tab by mouth two (2) times a day. Max Daily Amount: 1 mg., Disp: 30 Tab, Rfl: 0    cholecalciferol (VITAMIN D3) 1,000 unit cap, Take  by mouth two (2) times a day., Disp: , Rfl:     erythromycin 500 mg tablet, Take 150 mg by mouth three (3) times daily. , Disp: , Rfl:     melatonin 3 mg tablet, Take 3 mg by mouth nightly., Disp: , Rfl:     LORazepam (ATIVAN) 1 mg tablet, 1 every 8 hours for breakthrough anxiety, Disp: 40 Tab, Rfl: 0    hydrOXYzine (ATARAX) 25 mg tablet, Take 1 Tab by mouth three (3) times daily as needed for Itching., Disp: 60 Tab, Rfl: 3    ammonium lactate (LAC-HYDRIN) 12 % lotion, rub in to affected area well twice a day as needed, Disp: 400 mL, Rfl: 3    budesonide (PULMICORT) 0.5 mg/2 mL nbsp, 2 mL by Nebulization route two (2) times a day. (Patient taking differently: 500 mcg by Nebulization route two (2) times daily as needed.), Disp: 60 Each, Rfl: 3    levothyroxine (SYNTHROID) 100 mcg tablet, Take 1 Tab by mouth Daily (before breakfast). , Disp: 90 Tab, Rfl: 3    betamethasone dipropionate (DIPROSONE) 0.05 % topical cream, Apply  to affected area two (2) times a day. (Patient taking differently: Apply  to affected area as needed.), Disp: 30 g, Rfl: 3    estrogen, conjugated,-medroxyPROGESTERone (PREMPRO) 0.625-2.5 mg per tablet, Take 1 Tab by mouth daily. , Disp: 90 Tab, Rfl: 1    oxyCODONE IR (ROXICODONE) 10 mg tab immediate release tablet, Take 20 mg by mouth every four (4) hours as needed. , Disp: , Rfl:     Calcium Carbonate-Vit D3-Min (CALTRATE 600+D PLUS MINERALS) 600 mg calcium- 400 unit Tab, Take  by mouth daily. , Disp: , Rfl:     multivitamins-minerals-lutein (CENTRUM SILVER) Tab, Take  by mouth daily. , Disp: , Rfl:     chlorpheniramine-HYDROcodone (TUSSIONEX) 10-8 mg/5 mL suspension, Take 5 mL by mouth every twelve (12) hours as needed for Cough. Max Daily Amount: 10 mL., Disp: 200 mL, Rfl: 0    chlorpheniramine-HYDROcodone (TUSSIONEX) 10-8 mg/5 mL suspension, Take 5 mL by mouth every twelve (12) hours as needed for Cough.  Max Daily Amount: 10 mL., Disp: 200 mL, Rfl: 0  Visit Vitals    /49 (BP 1 Location: Right arm, BP Patient Position: Sitting)    Pulse 76    Temp 97.9 °F (36.6 °C) (Oral)    Resp 20    Ht 5' 4\" (1.626 m)    Wt 130 lb (59 kg)    SpO2 95%    BMI 22.31 kg/m2         Physical Exam   Constitutional: She appears well-developed and well-nourished. No distress. Musculoskeletal: She exhibits tenderness and deformity. Skin: She is not diaphoretic. Vitals reviewed.       ASSESSMENT and PLAN  dvt   Plan  Continue 2 mg coumadin  Check inr  04/17  Recheck in 2 weeks

## 2017-04-17 LAB
CREATININE, EXTERNAL: 1
INR, EXTERNAL: 1.52
PT, EXTERNAL: 16.6

## 2017-04-18 ENCOUNTER — TELEPHONE (OUTPATIENT)
Dept: FAMILY MEDICINE CLINIC | Age: 72
End: 2017-04-18

## 2017-04-18 NOTE — TELEPHONE ENCOUNTER
Sylvie Rodriges is a 70 y.o. female who presents today for Anticoagulation monitoring. Indication: DVT  INR Goal: 2.5-3.5. Current dose:  Coumadin 2mg daily. Missed Coumadin Doses:  None  Medication Changes:  no  Dietary Changes:  no    Symptoms: taking coumadin appropriately without any bleeding. Latest INRs:  Lab Results   Component Value Date/Time    INR, External 1.52 04/18/2017 11:16 AM    INR, External 1.4 04/10/2017 11:22 AM    INR, External 2.6 04/07/2017 08:36 AM        New Coumadin dose:.the following changes are made - defer to dr Krystyna Henry. James Saldivar

## 2017-04-18 NOTE — TELEPHONE ENCOUNTER
A nurse named April called to report the pt's PT INR    PT: 16.67  INR 1.52    2mgs daily, no missed doses

## 2017-04-26 ENCOUNTER — TELEPHONE (OUTPATIENT)
Dept: FAMILY MEDICINE CLINIC | Age: 72
End: 2017-04-26

## 2017-04-26 LAB
INR, EXTERNAL: 2.81
PT, EXTERNAL: 31

## 2017-04-26 NOTE — TELEPHONE ENCOUNTER
Eliza Spear is a 70 y.o. female who presents today for Anticoagulation monitoring. Indication: DVT  INR Goal: 2.5-3.5. Current dose:  Coumadin 3mg daily. Missed Coumadin Doses:  None  Medication Changes:  no  Dietary Changes:  no    Symptoms: taking coumadin appropriately without any bleeding. Latest INRs:  Lab Results   Component Value Date/Time    INR, External 2.81 04/26/2017 01:33 PM    INR, External 1.52 04/18/2017 11:16 AM    INR, External 1.30 04/13/2017        New Coumadin dose:.the following changes are made - defer to Dr. Whit Stein .

## 2017-04-27 LAB — CREATININE, EXTERNAL: 0.9

## 2017-04-28 ENCOUNTER — OFFICE VISIT (OUTPATIENT)
Dept: FAMILY MEDICINE CLINIC | Age: 72
End: 2017-04-28

## 2017-04-28 VITALS
HEART RATE: 72 BPM | HEIGHT: 64 IN | OXYGEN SATURATION: 94 % | TEMPERATURE: 97.3 F | RESPIRATION RATE: 18 BRPM | DIASTOLIC BLOOD PRESSURE: 78 MMHG | SYSTOLIC BLOOD PRESSURE: 138 MMHG

## 2017-04-28 DIAGNOSIS — J44.9 CHRONIC OBSTRUCTIVE PULMONARY DISEASE, UNSPECIFIED COPD TYPE (HCC): ICD-10-CM

## 2017-04-28 DIAGNOSIS — I82.621 ARM DVT (DEEP VENOUS THROMBOEMBOLISM), ACUTE, RIGHT (HCC): ICD-10-CM

## 2017-04-28 DIAGNOSIS — M86.9: Primary | ICD-10-CM

## 2017-04-28 NOTE — PROGRESS NOTES
HISTORY OF PRESENT ILLNESS  Delores James is a 70 y.o. female. HPI  Osteomyelitis stable  Copd stable  dvt stable  Review of Systems   Respiratory: Positive for cough. All other systems reviewed and are negative. Past Medical History:   Diagnosis Date    Bronchiolitis     Cough     GERD (gastroesophageal reflux disease)     went for surgery for it    Hypertension     JRA (juvenile rheumatoid arthritis) ([facility] [address]        Current Outpatient Prescriptions:     VANCOMYCIN/0.9 % SOD CHLORIDE (VANCOMYCIN IN 0.9% SODIUM CL) 750 mg/150 mL pgbk, by IntraVENous route., Disp: , Rfl:     chlorpheniramine-HYDROcodone (TUSSIONEX) 10-8 mg/5 mL suspension, Take 5 mL by mouth every twelve (12) hours as needed for Cough. Max Daily Amount: 10 mL., Disp: 200 mL, Rfl: 0    warfarin (COUMADIN) 1 mg tablet, Take 1 Tab by mouth daily. , Disp: 30 Tab, Rfl: 3    OTHER, Test INR as directed I82.629, Disp: 1 Each, Rfl: 0    metoprolol tartrate (LOPRESSOR) 25 mg tablet, TAKE 1 TABLET BY MOUTH TWICE DAILY, Disp: 180 Tab, Rfl: 0    chlorpheniramine-HYDROcodone (TUSSIONEX) 10-8 mg/5 mL suspension, Take 5 mL by mouth every twelve (12) hours as needed for Cough. Max Daily Amount: 10 mL., Disp: 200 mL, Rfl: 0    HYDROmorphone (DILAUDID) 1 mg/mL liqd oral solution, 4 ml 1 hour before surgery as directed, Disp: 8 mL, Rfl: 0    Saliva Substitution Combo No.3 spra, 1-2 sprays 3 times daily as directed, Disp: 40 mL, Rfl: 3    methylPREDNISolone (MEDROL DOSEPACK) 4 mg tablet, Take as directed, Disp: 1 Dose Pack, Rfl: 0    albuterol-ipratropium (DUO-NEB) 2.5 mg-0.5 mg/3 ml nebu, 3 mL by Nebulization route every six (6) hours as needed. Use 1 vial t.i.d. Via nebulizer prn, Disp: 360 Nebule, Rfl: 1    guaiFENesin (MUCINEX) 1,200 mg Ta12 ER tablet, Take 1 Tab by mouth two (2) times a day., Disp: 20 Tab, Rfl: 0    desloratadine (CLARINEX) 5 mg tablet, Take 1 Tab by mouth daily. , Disp: 30 Tab, Rfl: 0    clonazePAM (KLONOPIN) 0.5 mg tablet, Take 1 Tab by mouth two (2) times a day. Max Daily Amount: 1 mg., Disp: 30 Tab, Rfl: 0    cholecalciferol (VITAMIN D3) 1,000 unit cap, Take  by mouth two (2) times a day., Disp: , Rfl:     erythromycin 500 mg tablet, Take 150 mg by mouth three (3) times daily. , Disp: , Rfl:     melatonin 3 mg tablet, Take 3 mg by mouth nightly., Disp: , Rfl:     LORazepam (ATIVAN) 1 mg tablet, 1 every 8 hours for breakthrough anxiety, Disp: 40 Tab, Rfl: 0    hydrOXYzine (ATARAX) 25 mg tablet, Take 1 Tab by mouth three (3) times daily as needed for Itching., Disp: 60 Tab, Rfl: 3    ammonium lactate (LAC-HYDRIN) 12 % lotion, rub in to affected area well twice a day as needed, Disp: 400 mL, Rfl: 3    budesonide (PULMICORT) 0.5 mg/2 mL nbsp, 2 mL by Nebulization route two (2) times a day. (Patient taking differently: 500 mcg by Nebulization route two (2) times daily as needed.), Disp: 60 Each, Rfl: 3    levothyroxine (SYNTHROID) 100 mcg tablet, Take 1 Tab by mouth Daily (before breakfast). , Disp: 90 Tab, Rfl: 3    betamethasone dipropionate (DIPROSONE) 0.05 % topical cream, Apply  to affected area two (2) times a day. (Patient taking differently: Apply  to affected area as needed.), Disp: 30 g, Rfl: 3    estrogen, conjugated,-medroxyPROGESTERone (PREMPRO) 0.625-2.5 mg per tablet, Take 1 Tab by mouth daily. , Disp: 90 Tab, Rfl: 1    oxyCODONE IR (ROXICODONE) 10 mg tab immediate release tablet, Take 20 mg by mouth every four (4) hours as needed. , Disp: , Rfl:     Calcium Carbonate-Vit D3-Min (CALTRATE 600+D PLUS MINERALS) 600 mg calcium- 400 unit Tab, Take  by mouth daily. , Disp: , Rfl:     multivitamins-minerals-lutein (CENTRUM SILVER) Tab, Take  by mouth daily. , Disp: , Rfl:   Visit Vitals    Pulse 72    Temp 97.3 °F (36.3 °C) (Oral)    Resp 18    Ht 5' 4\" (1.626 m)    SpO2 94%         Physical Exam   Constitutional: She appears well-developed and well-nourished. No distress.    Cardiovascular: Normal rate, regular rhythm, normal heart sounds and intact distal pulses. Exam reveals no gallop and no friction rub. No murmur heard. Pulmonary/Chest: Effort normal. No respiratory distress. She has wheezes. She has no rales. She exhibits no tenderness. Skin: She is not diaphoretic. Vitals reviewed.   reviewed labs    ASSESSMENT and PLAN  copd   hbp  dvt  Plan  Recheck in 1 month

## 2017-04-28 NOTE — PROGRESS NOTES
Chief Complaint   Patient presents with    Blood Clot     Pain scale 0/10        1. Have you been to the ER, urgent care clinic since your last visit? Hospitalized since your last visit? No    2. Have you seen or consulted any other health care providers outside of the [address] [place] [address] since your last visit? Include any pap smears or colon screening.  Yes Where: Ortho

## 2017-04-28 NOTE — MR AVS SNAPSHOT
Visit Information Date & Time Provider Department Dept. Phone Encounter #  
 4/28/2017 10:30 AM Shanice Posey, 3 [facility] [address] [phone] [id number] Follow-up Instructions Return in about 1 month (around 5/28/2017). Follow-up and Disposition History Upcoming Health Maintenance Date Due Hepatitis C Screening 1945 COLONOSCOPY 5/2/1963 DTaP/Tdap/Td series (1 - Tdap) 5/2/1966 BREAST CANCER SCRN MAMMOGRAM 5/2/1995 ZOSTER VACCINE AGE 60> 5/2/2005 GLAUCOMA SCREENING Q2Y 4/5/2014 MEDICARE YEARLY EXAM 6/24/2016 Pneumococcal 65+ Low/Medium Risk (2 of 2 - PPSV23) 10/31/2017 Allergies as of 4/28/2017  Review Complete On: 4/28/2017 By: Shanice Posey MD  
  
 Severity Noted Reaction Type Reactions Amoxicillin  08/23/2010    Unknown (comments) Avelox [Moxifloxacin]  08/23/2010    Unknown (comments) Celexa [Citalopram]  08/23/2010    Unknown (comments) Ciprofloxacin  08/23/2010    Unknown (comments) Doxycycline  01/17/2017    Nausea and Vomiting Levaquin [Levofloxacin]  08/23/2010    Unknown (comments) Paxil [Paroxetine Hcl]  08/23/2010    Unknown (comments) Sulfa (Sulfonamide Antibiotics)  08/23/2010    Unknown (comments) Joints ache Tequin [Gatifloxacin]  08/23/2010    Unknown (comments) Current Immunizations  Reviewed on 10/31/2016 Name Date Influenza High Dose Vaccine PF 10/31/2016  2:06 PM, 10/20/2015 Influenza Vaccine 10/22/2013 Influenza Vaccine (Quad) 11/24/2014 12:17 PM  
 Influenza Vaccine Split 11/16/2011 Not reviewed this visit You Were Diagnosed With   
  
 Codes Comments Osteomyelitis of left humerus, unspecified type    -  Primary ICD-10-CM: M86.9 ICD-9-CM: 730.22 Arm DVT (deep venous thromboembolism), acute, right     ICD-10-CM: A75.780 ICD-9-CM: 453.82 Chronic obstructive pulmonary disease, unspecified COPD type ([facility]     ICD-10-CM: J44.9 ICD-9-CM: 562 Vitals Pulse Temp Resp Height(growth percentile) SpO2 OB Status 72 97.3 °F (36.3 °C) (Oral) 18 5' 4\" (1.626 m) 94% Postmenopausal  
 Smoking Status Never Smoker Preferred Pharmacy Pharmacy Name Phone [facility] Co[facility] [address], [address] [phone] Your Updated Medication List  
  
   
This list is accurate as of: 4/28/17 10:37 AM.  Always use your most recent med list.  
  
  
  
  
 albuterol-ipratropium 2.5 mg-0.5 mg/3 ml Nebu Commonly known as:  DUO-NEB  
3 mL by Nebulization route every six (6) hours as needed. Use 1 vial t.i.d. Via nebulizer prn  
  
 ammonium lactate 12 % lotion Commonly known as:  LAC-HYDRIN  
rub in to affected area well twice a day as needed  
  
 betamethasone dipropionate 0.05 % topical cream  
Commonly known as:  Tutu Laming Apply  to affected area two (2) times a day. budesonide 0.5 mg/2 mL Nbsp Commonly known as:  PULMICORT  
2 mL by Nebulization route two (2) times a day. CALTRATE 600+D PLUS MINERALS 600 mg calcium- 400 unit Tab Generic drug:  Calcium Carbonate-Vit D3-Min Take  by mouth daily. CENTRUM SILVER Tab tablet Generic drug:  multivitamins-minerals-lutein Take  by mouth daily. * chlorpheniramine-HYDROcodone 10-8 mg/5 mL suspension Commonly known as:  Shonda Conner Take 5 mL by mouth every twelve (12) hours as needed for Cough. Max Daily Amount: 10 mL. * chlorpheniramine-HYDROcodone 10-8 mg/5 mL suspension Commonly known as:  Shonda Conner Take 5 mL by mouth every twelve (12) hours as needed for Cough. Max Daily Amount: 10 mL. clonazePAM 0.5 mg tablet Commonly known as:  Valere Idler Take 1 Tab by mouth two (2) times a day. Max Daily Amount: 1 mg.  
  
 desloratadine 5 mg tablet Commonly known as:  CLARINEX Take 1 Tab by mouth daily. erythromycin 500 mg tablet Take 150 mg by mouth three (3) times daily. estrogen (conjugated)-medroxyPROGESTERone 0.625-2.5 mg per tablet Commonly known as:  Castro Pink Take 1 Tab by mouth daily. guaiFENesin 1,200 mg Ta12 ER tablet Commonly known as:  Deni & Deni Take 1 Tab by mouth two (2) times a day. HYDROmorphone 1 mg/mL Liqd oral solution Commonly known as:  DILAUDID  
4 ml 1 hour before surgery as directed  
  
 hydrOXYzine HCl 25 mg tablet Commonly known as:  ATARAX Take 1 Tab by mouth three (3) times daily as needed for Itching. levothyroxine 100 mcg tablet Commonly known as:  SYNTHROID Take 1 Tab by mouth Daily (before breakfast). LORazepam 1 mg tablet Commonly known as:  ATIVAN  
1 every 8 hours for breakthrough anxiety  
  
 melatonin 3 mg tablet Take 3 mg by mouth nightly. methylPREDNISolone 4 mg tablet Commonly known as:  Humphrey Nightingale Take as directed  
  
 metoprolol tartrate 25 mg tablet Commonly known as:  LOPRESSOR  
TAKE 1 TABLET BY MOUTH TWICE DAILY  
  
 OTHER Test INR as directed I82.629  
  
 oxyCODONE IR 10 mg Tab immediate release tablet Commonly known as:  Malgorzata Houston Take 20 mg by mouth every four (4) hours as needed. Saliva Substitution Combo No.3 Spra 1-2 sprays 3 times daily as directed  
  
 vancomycin in 0.9% sodium Cl 750 mg/150 mL Pgbk  
by IntraVENous route. VITAMIN D3 1,000 unit Cap Generic drug:  cholecalciferol Take  by mouth two (2) times a day. warfarin 1 mg tablet Commonly known as:  COUMADIN Take 1 Tab by mouth daily. * Notice: This list has 2 medication(s) that are the same as other medications prescribed for you. Read the directions carefully, and ask your doctor or other care provider to review them with you. Follow-up Instructions Return in about 1 month (around 5/28/2017). Introducing [facility] & HEALTH SERVICES! Dear Renetta Overall: Thank you for requesting a [employer] account. Our records indicate that you already have an active [employer] account. You can access your account anytime at [email or web address] Did you know that you can access your hospital and ER discharge instructions at any time in [employer]? You can also review all of your test results from your hospital stay or ER visit. Additional Information If you have questions, please visit the Frequently Asked Questions section of the [employer] website at [email or web address] Remember, [employer] is NOT to be used for urgent needs. For medical emergencies, dial 911. Now available from your iPhone and Android! Please provide this summary of care documentation to your next provider. Your primary care clinician is listed as Bulmaro Hancock. If you have any questions after today's visit, please call [phone].

## 2017-05-01 LAB
CREATININE, EXTERNAL: 0.9
INR, EXTERNAL: 1.96
PT, EXTERNAL: 20.8

## 2017-05-08 ENCOUNTER — TELEPHONE (OUTPATIENT)
Dept: FAMILY MEDICINE CLINIC | Age: 72
End: 2017-05-08

## 2017-06-05 RX ORDER — HYDROCODONE POLISTIREX AND CHLORPHENIRAMINE POLISTIREX 10; 8 MG/5ML; MG/5ML
5 SUSPENSION, EXTENDED RELEASE ORAL
Qty: 200 ML | Refills: 0 | Status: SHIPPED | OUTPATIENT
Start: 2017-06-05 | End: 2017-09-01 | Stop reason: SDUPTHER

## 2017-06-05 NOTE — TELEPHONE ENCOUNTER
Please call Marcela Pacheco. Orders Placed This Encounter    chlorpheniramine-HYDROcodone (TUSSIONEX) 10-8 mg/5 mL suspension     Sig: Take 5 mL by mouth every twelve (12) hours as needed for Cough. Max Daily Amount: 10 mL. Dispense:  200 mL     Refill:  0     Temporary Rx, on behalf or Dr. Patricia Quinones             Reviewed . Receives oxycodone from Dr. Cali Galindo. Receives Tussionex from Dr. Patricia Quinones, 200ml Rx filled on 4/29/2017, 4/03/2017, 2/28/2017, 1/26/2017, 1/4/2017.

## 2017-06-05 NOTE — TELEPHONE ENCOUNTER
Pt stated that she needs a refill for her tussionex medication. Patient was informed that this medication usually requires an appointment. Patient then insisted that she usually just pick it up. Asking to be called back.

## 2017-06-13 ENCOUNTER — OFFICE VISIT (OUTPATIENT)
Dept: FAMILY MEDICINE CLINIC | Age: 72
End: 2017-06-13

## 2017-06-13 VITALS
HEIGHT: 64 IN | OXYGEN SATURATION: 94 % | SYSTOLIC BLOOD PRESSURE: 104 MMHG | DIASTOLIC BLOOD PRESSURE: 31 MMHG | HEART RATE: 67 BPM | RESPIRATION RATE: 18 BRPM | TEMPERATURE: 96.9 F

## 2017-06-13 DIAGNOSIS — S69.91XA HAND INJURY, RIGHT, INITIAL ENCOUNTER: ICD-10-CM

## 2017-06-13 DIAGNOSIS — H26.9 CATARACT, UNSPECIFIED CATARACT TYPE, UNSPECIFIED LATERALITY: ICD-10-CM

## 2017-06-13 DIAGNOSIS — R05.3 CHRONIC COUGH: ICD-10-CM

## 2017-06-13 DIAGNOSIS — I82.A11 ACUTE DEEP VEIN THROMBOSIS (DVT) OF AXILLARY VEIN OF RIGHT UPPER EXTREMITY (HCC): Primary | ICD-10-CM

## 2017-06-13 LAB
INR BLD: 2
PT POC: 24.1 SECONDS
VALID INTERNAL CONTROL?: YES

## 2017-06-13 NOTE — PROGRESS NOTES
HISTORY OF PRESENT ILLNESS  Richard Sloan is a 67 y.o. female. HPI  dvt RUE resolved  PICC line out  Chronic cough stable  Recent fall injured r hand  Review of Systems   Respiratory: Positive for cough. Musculoskeletal: Positive for joint pain. All other systems reviewed and are negative. >pmh  Current Outpatient Prescriptions on File Prior to Visit   Medication Sig Dispense Refill    chlorpheniramine-HYDROcodone (TUSSIONEX) 10-8 mg/5 mL suspension Take 5 mL by mouth every twelve (12) hours as needed for Cough. Max Daily Amount: 10 mL. 200 mL 0    warfarin (COUMADIN) 1 mg tablet Take 1 Tab by mouth daily. (Patient taking differently: Take 1 mg by mouth daily. Indications: patient taking 3 mg per day) 30 Tab 3    OTHER Test INR as directed  I82.629 1 Each 0    metoprolol tartrate (LOPRESSOR) 25 mg tablet TAKE 1 TABLET BY MOUTH TWICE DAILY 180 Tab 0    HYDROmorphone (DILAUDID) 1 mg/mL liqd oral solution 4 ml 1 hour before surgery as directed 8 mL 0    Saliva Substitution Combo No.3 spra 1-2 sprays 3 times daily as directed 40 mL 3    albuterol-ipratropium (DUO-NEB) 2.5 mg-0.5 mg/3 ml nebu 3 mL by Nebulization route every six (6) hours as needed. Use 1 vial t.i.d. Via nebulizer prn 360 Nebule 1    guaiFENesin (MUCINEX) 1,200 mg Ta12 ER tablet Take 1 Tab by mouth two (2) times a day. 20 Tab 0    desloratadine (CLARINEX) 5 mg tablet Take 1 Tab by mouth daily. 30 Tab 0    clonazePAM (KLONOPIN) 0.5 mg tablet Take 1 Tab by mouth two (2) times a day. Max Daily Amount: 1 mg. 30 Tab 0    cholecalciferol (VITAMIN D3) 1,000 unit cap Take  by mouth two (2) times a day.  erythromycin 500 mg tablet Take 150 mg by mouth three (3) times daily.  melatonin 3 mg tablet Take 3 mg by mouth nightly.  LORazepam (ATIVAN) 1 mg tablet 1 every 8 hours for breakthrough anxiety 40 Tab 0    hydrOXYzine (ATARAX) 25 mg tablet Take 1 Tab by mouth three (3) times daily as needed for Itching.  60 Tab 3    ammonium lactate (LAC-HYDRIN) 12 % lotion rub in to affected area well twice a day as needed 400 mL 3    budesonide (PULMICORT) 0.5 mg/2 mL nbsp 2 mL by Nebulization route two (2) times a day. (Patient taking differently: 500 mcg by Nebulization route two (2) times daily as needed.) 60 Each 3    levothyroxine (SYNTHROID) 100 mcg tablet Take 1 Tab by mouth Daily (before breakfast). 90 Tab 3    betamethasone dipropionate (DIPROSONE) 0.05 % topical cream Apply  to affected area two (2) times a day. (Patient taking differently: Apply  to affected area as needed.) 30 g 3    estrogen, conjugated,-medroxyPROGESTERone (PREMPRO) 0.625-2.5 mg per tablet Take 1 Tab by mouth daily. 90 Tab 1    oxyCODONE IR (ROXICODONE) 10 mg tab immediate release tablet Take 20 mg by mouth every four (4) hours as needed.  Calcium Carbonate-Vit D3-Min (CALTRATE 600+D PLUS MINERALS) 600 mg calcium- 400 unit Tab Take  by mouth daily.  multivitamins-minerals-lutein (CENTRUM SILVER) Tab Take  by mouth daily.  VANCOMYCIN/0.9 % SOD CHLORIDE (VANCOMYCIN IN 0.9% SODIUM CL) 750 mg/150 mL pgbk by IntraVENous route.  chlorpheniramine-HYDROcodone (TUSSIONEX) 10-8 mg/5 mL suspension Take 5 mL by mouth every twelve (12) hours as needed for Cough. Max Daily Amount: 10 mL. 200 mL 0     No current facility-administered medications on file prior to visit. Visit Vitals    BP (!) 104/31 (BP 1 Location: Right arm, BP Patient Position: Sitting)    Pulse 67    Temp 96.9 °F (36.1 °C) (Oral)    Resp 18    Ht 5' 4\" (1.626 m)    SpO2 94%         Physical Exam   Constitutional: She appears well-developed and well-nourished. No distress. Musculoskeletal: She exhibits edema, tenderness and deformity. Ecchymosis, swelling r hand   Skin: She is not diaphoretic. Vitals reviewed.     inr 2.0  xr ? djd 5th mcp  ASSESSMENT and PLAN  chronic cough   Hand injury  Plan  D/c coumadin  Refills  Recheck in 1 month

## 2017-06-13 NOTE — MR AVS SNAPSHOT
Visit Information Date & Time Provider Department Dept. Phone Encounter #  
 6/13/2017 11:15 AM Inez Kanner, [facility] [phone] [id number] Follow-up Instructions Return in about 1 month (around 7/13/2017). Follow-up and Disposition History Upcoming Health Maintenance Date Due Hepatitis C Screening 1945 COLONOSCOPY 5/2/1963 DTaP/Tdap/Td series (1 - Tdap) 5/2/1966 ZOSTER VACCINE AGE 60> 5/2/2005 GLAUCOMA SCREENING Q2Y 4/5/2014 MEDICARE YEARLY EXAM 6/24/2016 INFLUENZA AGE 9 TO ADULT 8/1/2017 Pneumococcal 65+ Low/Medium Risk (2 of 2 - PPSV23) 10/31/2017 BREAST CANCER SCRN MAMMOGRAM 6/13/2019 Allergies as of 6/13/2017  Review Complete On: 6/13/2017 By: Inez Kanner, MD  
  
 Severity Noted Reaction Type Reactions Amoxicillin  08/23/2010    Unknown (comments) Avelox [Moxifloxacin]  08/23/2010    Unknown (comments) Celexa [Citalopram]  08/23/2010    Unknown (comments) Ciprofloxacin  08/23/2010    Unknown (comments) Doxycycline  01/17/2017    Nausea and Vomiting Levaquin [Levofloxacin]  08/23/2010    Unknown (comments) Paxil [Paroxetine Hcl]  08/23/2010    Unknown (comments) Sulfa (Sulfonamide Antibiotics)  08/23/2010    Unknown (comments) Joints ache Tequin [Gatifloxacin]  08/23/2010    Unknown (comments) Current Immunizations  Reviewed on 10/31/2016 Name Date Influenza High Dose Vaccine PF 10/31/2016  2:06 PM, 10/20/2015 Influenza Vaccine 10/22/2013 Influenza Vaccine (Quad) 11/24/2014 12:17 PM  
 Influenza Vaccine Split 11/16/2011 Not reviewed this visit You Were Diagnosed With   
  
 Codes Comments Acute deep vein thrombosis (DVT) of axillary vein of right upper extremity (HCC)    -  Primary ICD-10-CM: I82. A11 
ICD-9-CM: 453.84 Hand injury, right, initial encounter     ICD-10-CM: M08.97DG ICD-9-CM: 349. 4 Chronic cough     ICD-10-CM: R05 ICD-9-CM: 687. 2 Cataract, unspecified cataract type, unspecified laterality     ICD-10-CM: H26.9 ICD-9-CM: 366.9 Vitals BP Pulse Temp Resp Height(growth percentile) SpO2  
 (!) 104/31 (BP 1 Location: Right arm, BP Patient Position: Sitting) 67 96.9 °F (36.1 °C) (Oral) 18 5' 4\" (1.626 m) 94% OB Status Smoking Status Postmenopausal Never Smoker Preferred Pharmacy Pharmacy Name Phone [facility] [address] [facility] [address], [address] [phone] Your Updated Medication List  
  
   
This list is accurate as of: 6/13/17 11:48 AM.  Always use your most recent med list.  
  
  
  
  
 albuterol-ipratropium 2.5 mg-0.5 mg/3 ml Nebu Commonly known as:  DUO-NEB  
3 mL by Nebulization route every six (6) hours as needed. Use 1 vial t.i.d. Via nebulizer prn  
  
 ammonium lactate 12 % lotion Commonly known as:  LAC-HYDRIN  
rub in to affected area well twice a day as needed  
  
 betamethasone dipropionate 0.05 % topical cream  
Commonly known as:  Jamari Lame Apply  to affected area two (2) times a day. budesonide 0.5 mg/2 mL Nbsp Commonly known as:  PULMICORT  
2 mL by Nebulization route two (2) times a day. CALTRATE 600+D PLUS MINERALS 600 mg calcium- 400 unit Tab Generic drug:  Calcium Carbonate-Vit D3-Min Take  by mouth daily. CENTRUM SILVER Tab tablet Generic drug:  multivitamins-minerals-lutein Take  by mouth daily. * chlorpheniramine-HYDROcodone 10-8 mg/5 mL suspension Commonly known as:  Amilcar Abts Take 5 mL by mouth every twelve (12) hours as needed for Cough. Max Daily Amount: 10 mL. * chlorpheniramine-HYDROcodone 10-8 mg/5 mL suspension Commonly known as:  Amilcar Abts Take 5 mL by mouth every twelve (12) hours as needed for Cough. Max Daily Amount: 10 mL. clonazePAM 0.5 mg tablet Commonly known as:  Valdez Hathaway  
 Take 1 Tab by mouth two (2) times a day. Max Daily Amount: 1 mg.  
  
 desloratadine 5 mg tablet Commonly known as:  CLARINEX Take 1 Tab by mouth daily. erythromycin 500 mg tablet Take 150 mg by mouth three (3) times daily. estrogen (conjugated)-medroxyPROGESTERone 0.625-2.5 mg per tablet Commonly known as:  Rebollar Karlie Take 1 Tab by mouth daily. guaiFENesin 1,200 mg Ta12 ER tablet Commonly known as:  [employer] Deni Take 1 Tab by mouth two (2) times a day. HYDROmorphone 1 mg/mL Liqd oral solution Commonly known as:  DILAUDID  
4 ml 1 hour before surgery as directed  
  
 hydrOXYzine HCl 25 mg tablet Commonly known as:  ATARAX Take 1 Tab by mouth three (3) times daily as needed for Itching. levothyroxine 100 mcg tablet Commonly known as:  SYNTHROID Take 1 Tab by mouth Daily (before breakfast). LORazepam 1 mg tablet Commonly known as:  ATIVAN  
1 every 8 hours for breakthrough anxiety  
  
 melatonin 3 mg tablet Take 3 mg by mouth nightly. metoprolol tartrate 25 mg tablet Commonly known as:  LOPRESSOR  
TAKE 1 TABLET BY MOUTH TWICE DAILY  
  
 OTHER Test INR as directed I82.629  
  
 oxyCODONE IR 10 mg Tab immediate release tablet Commonly known as:  Martinez Sanchez Take 20 mg by mouth every four (4) hours as needed. Saliva Substitution Combo No.3 Spra 1-2 sprays 3 times daily as directed  
  
 vancomycin in 0.9% sodium Cl 750 mg/150 mL Pgbk  
by IntraVENous route. VITAMIN D3 1,000 unit Cap Generic drug:  cholecalciferol Take  by mouth two (2) times a day. warfarin 1 mg tablet Commonly known as:  COUMADIN Take 1 Tab by mouth daily. * Notice: This list has 2 medication(s) that are the same as other medications prescribed for you. Read the directions carefully, and ask your doctor or other care provider to review them with you. We Performed the Following AMB POC PT/INR [45985 CPT(R)] REFERRAL TO OPHTHALMOLOGY [REF57 Custom] Comments:  
 Please evaluate patient for cataract. Follow-up Instructions Return in about 1 month (around 7/13/2017). To-Do List   
 06/13/2017 Imaging:  XR HAND RT MIN 3 V Referral Information Referral ID Referred By Referred To  
  
 [id number] Christopher Jacobson MD   
   [address] [place], [place] [address] Lul Hanks Phone: [phone] Fax: [phone] Visits Status Start Date End Date 1 New Request 6/13/17 6/13/18 If your referral has a status of pending review or denied, additional information will be sent to support the outcome of this decision. Introducing [facility] & HEALTH SERVICES! Dear Sreekanth Rooney: Thank you for requesting a [employer] account. Our records indicate that you already have an active [employer] account. You can access your account anytime at [email or web address] Did you know that you can access your hospital and ER discharge instructions at any time in [employer]? You can also review all of your test results from your hospital stay or ER visit. Additional Information If you have questions, please visit the Frequently Asked Questions section of the [employer] website at [email or web address] Remember, [employer] is NOT to be used for urgent needs. For medical emergencies, dial 911. Now available from your iPhone and Android! Please provide this summary of care documentation to your next provider. Your primary care clinician is listed as Danny Daly. If you have any questions after today's visit, please call [phone].

## 2017-06-13 NOTE — PROGRESS NOTES
Chief Complaint   Patient presents with    Osteomyelitis     follow up    COPD     follow up     Pain scale 0/10      1. Have you been to the ER, urgent care clinic since your last visit? Hospitalized since your last visit? No    2. Have you seen or consulted any other health care providers outside of the [address] [place] [address] since your last visit? Include any pap smears or colon screening.  Yes Where: pain management, Ortho

## 2017-06-13 NOTE — PROGRESS NOTES
Mikie Acevedo is a 67 y.o. female who presents today for Anticoagulation monitoring. Indication: DVT  INR Goal: 2.0-3.0. Current dose:  Coumadin 3mg daily. Missed Coumadin Doses:  None  Medication Changes:  no  Dietary Changes:  no    Symptoms: taking coumadin appropriately without any bleeding. Latest INRs:  Lab Results   Component Value Date/Time    INR, External 1.96 05/01/2017    INR, External 2.81 04/26/2017 01:33 PM    INR, External 1.52 04/17/2017 11:16 AM    INR POC 2.0 06/13/2017 11:31 AM        New Coumadin dose:.the following changes are made - defer to dr Ernesto Stout.

## 2017-06-16 ENCOUNTER — CLINICAL SUPPORT (OUTPATIENT)
Dept: FAMILY MEDICINE CLINIC | Age: 72
End: 2017-06-16

## 2017-06-16 DIAGNOSIS — S62.339A BOXER'S FRACTURE, CLOSED, INITIAL ENCOUNTER: Primary | ICD-10-CM

## 2017-06-16 NOTE — PROGRESS NOTES
HISTORY OF PRESENT ILLNESS  Yunior Silva is a 67 y.o. female. HPI  + fracture 5th MCP right  minimal displacement  Review of Systems   All other systems reviewed and are negative. Past Medical History:   Diagnosis Date    Bronchiolitis     Cough     GERD (gastroesophageal reflux disease)     went for surgery for it    Hypertension     JRA (juvenile rheumatoid arthritis) ([address]        Current Outpatient Prescriptions:     chlorpheniramine-HYDROcodone (TUSSIONEX) 10-8 mg/5 mL suspension, Take 5 mL by mouth every twelve (12) hours as needed for Cough. Max Daily Amount: 10 mL., Disp: 200 mL, Rfl: 0    VANCOMYCIN/0.9 % SOD CHLORIDE (VANCOMYCIN IN 0.9% SODIUM CL) 750 mg/150 mL pgbk, by IntraVENous route., Disp: , Rfl:     chlorpheniramine-HYDROcodone (TUSSIONEX) 10-8 mg/5 mL suspension, Take 5 mL by mouth every twelve (12) hours as needed for Cough. Max Daily Amount: 10 mL., Disp: 200 mL, Rfl: 0    warfarin (COUMADIN) 1 mg tablet, Take 1 Tab by mouth daily. (Patient taking differently: Take 1 mg by mouth daily. Indications: patient taking 3 mg per day), Disp: 30 Tab, Rfl: 3    OTHER, Test INR as directed I82.629, Disp: 1 Each, Rfl: 0    metoprolol tartrate (LOPRESSOR) 25 mg tablet, TAKE 1 TABLET BY MOUTH TWICE DAILY, Disp: 180 Tab, Rfl: 0    HYDROmorphone (DILAUDID) 1 mg/mL liqd oral solution, 4 ml 1 hour before surgery as directed, Disp: 8 mL, Rfl: 0    Saliva Substitution Combo No.3 spra, 1-2 sprays 3 times daily as directed, Disp: 40 mL, Rfl: 3    albuterol-ipratropium (DUO-NEB) 2.5 mg-0.5 mg/3 ml nebu, 3 mL by Nebulization route every six (6) hours as needed. Use 1 vial t.i.d. Via nebulizer prn, Disp: 360 Nebule, Rfl: 1    guaiFENesin (MUCINEX) 1,200 mg Ta12 ER tablet, Take 1 Tab by mouth two (2) times a day., Disp: 20 Tab, Rfl: 0    desloratadine (CLARINEX) 5 mg tablet, Take 1 Tab by mouth daily. , Disp: 30 Tab, Rfl: 0    clonazePAM (KLONOPIN) 0.5 mg tablet, Take 1 Tab by mouth two (2) times a day. Max Daily Amount: 1 mg., Disp: 30 Tab, Rfl: 0    cholecalciferol (VITAMIN D3) 1,000 unit cap, Take  by mouth two (2) times a day., Disp: , Rfl:     erythromycin 500 mg tablet, Take 150 mg by mouth three (3) times daily. , Disp: , Rfl:     melatonin 3 mg tablet, Take 3 mg by mouth nightly., Disp: , Rfl:     LORazepam (ATIVAN) 1 mg tablet, 1 every 8 hours for breakthrough anxiety, Disp: 40 Tab, Rfl: 0    hydrOXYzine (ATARAX) 25 mg tablet, Take 1 Tab by mouth three (3) times daily as needed for Itching., Disp: 60 Tab, Rfl: 3    ammonium lactate (LAC-HYDRIN) 12 % lotion, rub in to affected area well twice a day as needed, Disp: 400 mL, Rfl: 3    budesonide (PULMICORT) 0.5 mg/2 mL nbsp, 2 mL by Nebulization route two (2) times a day. (Patient taking differently: 500 mcg by Nebulization route two (2) times daily as needed.), Disp: 60 Each, Rfl: 3    levothyroxine (SYNTHROID) 100 mcg tablet, Take 1 Tab by mouth Daily (before breakfast). , Disp: 90 Tab, Rfl: 3    betamethasone dipropionate (DIPROSONE) 0.05 % topical cream, Apply  to affected area two (2) times a day. (Patient taking differently: Apply  to affected area as needed.), Disp: 30 g, Rfl: 3    estrogen, conjugated,-medroxyPROGESTERone (PREMPRO) 0.625-2.5 mg per tablet, Take 1 Tab by mouth daily. , Disp: 90 Tab, Rfl: 1    oxyCODONE IR (ROXICODONE) 10 mg tab immediate release tablet, Take 20 mg by mouth every four (4) hours as needed. , Disp: , Rfl:     Calcium Carbonate-Vit D3-Min (CALTRATE 600+D PLUS MINERALS) 600 mg calcium- 400 unit Tab, Take  by mouth daily. , Disp: , Rfl:     multivitamins-minerals-lutein (CENTRUM SILVER) Tab, Take  by mouth daily. , Disp: , Rfl:   There were no vitals taken for this visit. Physical Exam   Constitutional: She appears well-developed and well-nourished. No distress. Musculoskeletal: Normal range of motion. She exhibits edema and deformity. She exhibits no tenderness.    ecchymosis   Skin: She is not diaphoretic. Vitals reviewed.     Spica splint applied  ASSESSMENT and PLAN  mcp fracture   Plan  Recheck as planned

## 2017-06-16 NOTE — MR AVS SNAPSHOT
Visit Information Date & Time Provider Department Dept. Phone Encounter #  
 6/16/2017  1:30 PM Inez Kanner, [facility] [phone] [id number] Follow-up Instructions Return as planned. Follow-up and Disposition History Your Appointments 7/12/2017  2:30 PM  
Follow Up with Inez Kanner, MD  
3 [facility]) Appt Note: 1 month f/u  
 [address] [facility] [address] [facility] [id number] [phone]  
  
   
 [address] Upcoming Health Maintenance Date Due Hepatitis C Screening 1945 COLONOSCOPY 5/2/1963 DTaP/Tdap/Td series (1 - Tdap) 5/2/1966 ZOSTER VACCINE AGE 60> 5/2/2005 GLAUCOMA SCREENING Q2Y 4/5/2014 MEDICARE YEARLY EXAM 6/24/2016 INFLUENZA AGE 9 TO ADULT 8/1/2017 Pneumococcal 65+ Low/Medium Risk (2 of 2 - PPSV23) 10/31/2017 BREAST CANCER SCRN MAMMOGRAM 6/13/2019 Allergies as of 6/16/2017  Review Complete On: 6/16/2017 By: Inez Kanner, MD  
  
 Severity Noted Reaction Type Reactions Amoxicillin  08/23/2010    Unknown (comments) Avelox [Moxifloxacin]  08/23/2010    Unknown (comments) Celexa [Citalopram]  08/23/2010    Unknown (comments) Ciprofloxacin  08/23/2010    Unknown (comments) Doxycycline  01/17/2017    Nausea and Vomiting Levaquin [Levofloxacin]  08/23/2010    Unknown (comments) Paxil [Paroxetine Hcl]  08/23/2010    Unknown (comments) Sulfa (Sulfonamide Antibiotics)  08/23/2010    Unknown (comments) Joints ache Tequin [Gatifloxacin]  08/23/2010    Unknown (comments) Current Immunizations  Reviewed on 10/31/2016 Name Date Influenza High Dose Vaccine PF 10/31/2016  2:06 PM, 10/20/2015 Influenza Vaccine 10/22/2013 Influenza Vaccine (Quad) 11/24/2014 12:17 PM  
 Influenza Vaccine Split 11/16/2011 Not reviewed this visit You Were Diagnosed With   
 Codes Comments Boxjs's fracture, closed, initial encounter    -  Primary ICD-10-CM: A84.847Y ICD-9-CM: 815.00 Vitals OB Status Smoking Status Postmenopausal Never Smoker Preferred Pharmacy Pharmacy Name Phone [facility] [address] [facility], [address] [phone] Your Updated Medication List  
  
   
This list is accurate as of: 6/16/17  2:04 PM.  Always use your most recent med list.  
  
  
  
  
 albuterol-ipratropium 2.5 mg-0.5 mg/3 ml Nebu Commonly known as:  DUO-NEB  
3 mL by Nebulization route every six (6) hours as needed. Use 1 vial t.i.d. Via nebulizer prn  
  
 ammonium lactate 12 % lotion Commonly known as:  LAC-HYDRIN  
rub in to affected area well twice a day as needed  
  
 betamethasone dipropionate 0.05 % topical cream  
Commonly known as:  Lisa Reamer Apply  to affected area two (2) times a day. budesonide 0.5 mg/2 mL Nbsp Commonly known as:  PULMICORT  
2 mL by Nebulization route two (2) times a day. CALTRATE 600+D PLUS MINERALS 600 mg calcium- 400 unit Tab Generic drug:  Calcium Carbonate-Vit D3-Min Take  by mouth daily. CENTRUM SILVER Tab tablet Generic drug:  multivitamins-minerals-lutein Take  by mouth daily. * chlorpheniramine-HYDROcodone 10-8 mg/5 mL suspension Commonly known as:  [place] Nestle Take 5 mL by mouth every twelve (12) hours as needed for Cough. Max Daily Amount: 10 mL. * chlorpheniramine-HYDROcodone 10-8 mg/5 mL suspension Commonly known as:  [place] Nestle Take 5 mL by mouth every twelve (12) hours as needed for Cough. Max Daily Amount: 10 mL. clonazePAM 0.5 mg tablet Commonly known as:  Gene Pepito Take 1 Tab by mouth two (2) times a day. Max Daily Amount: 1 mg.  
  
 desloratadine 5 mg tablet Commonly known as:  CLARINEX Take 1 Tab by mouth daily. erythromycin 500 mg tablet Take 150 mg by mouth three (3) times daily. estrogen (conjugated)-medroxyPROGESTERone 0.625-2.5 mg per tablet Commonly known as:  Dulcie Peals Take 1 Tab by mouth daily. guaiFENesin 1,200 mg Ta12 ER tablet Commonly known as:  Deni & Deni Take 1 Tab by mouth two (2) times a day. HYDROmorphone 1 mg/mL Liqd oral solution Commonly known as:  DILAUDID  
4 ml 1 hour before surgery as directed  
  
 hydrOXYzine HCl 25 mg tablet Commonly known as:  ATARAX Take 1 Tab by mouth three (3) times daily as needed for Itching. levothyroxine 100 mcg tablet Commonly known as:  SYNTHROID Take 1 Tab by mouth Daily (before breakfast). LORazepam 1 mg tablet Commonly known as:  ATIVAN  
1 every 8 hours for breakthrough anxiety  
  
 melatonin 3 mg tablet Take 3 mg by mouth nightly. metoprolol tartrate 25 mg tablet Commonly known as:  LOPRESSOR  
TAKE 1 TABLET BY MOUTH TWICE DAILY  
  
 OTHER Test INR as directed I82.629  
  
 oxyCODONE IR 10 mg Tab immediate release tablet Commonly known as:  Mal Longs Take 20 mg by mouth every four (4) hours as needed. Saliva Substitution Combo No.3 Spra 1-2 sprays 3 times daily as directed  
  
 vancomycin in 0.9% sodium Cl 750 mg/150 mL Pgbk  
by IntraVENous route. VITAMIN D3 1,000 unit Cap Generic drug:  cholecalciferol Take  by mouth two (2) times a day. warfarin 1 mg tablet Commonly known as:  COUMADIN Take 1 Tab by mouth daily. * Notice: This list has 2 medication(s) that are the same as other medications prescribed for you. Read the directions carefully, and ask your doctor or other care provider to review them with you. Follow-up Instructions Return as planned. Introducing [facility]! Dear Sreekanth Rooney: Thank you for requesting a INFOGRAPHIQS account. Our records indicate that you already have an active INFOGRAPHIQS account.   You can access your account anytime at [email or web address] Did you know that you can access your hospital and ER discharge instructions at any time in ZeroMail? You can also review all of your test results from your hospital stay or ER visit. Additional Information If you have questions, please visit the Frequently Asked Questions section of the ZeroMail website at [email or web address] Remember, ZeroMail is NOT to be used for urgent needs. For medical emergencies, dial 911. Now available from your iPhone and Android! Please provide this summary of care documentation to your next provider. Your primary care clinician is listed as Mirna Alvarado. If you have any questions after today's visit, please call [phone].

## 2017-07-05 ENCOUNTER — TELEPHONE (OUTPATIENT)
Dept: FAMILY MEDICINE CLINIC | Age: 72
End: 2017-07-05

## 2017-07-05 RX ORDER — HYDROCODONE POLISTIREX AND CHLORPHENIRAMINE POLISTIREX 10; 8 MG/5ML; MG/5ML
5 SUSPENSION, EXTENDED RELEASE ORAL
Qty: 200 ML | Refills: 0 | Status: SHIPPED | OUTPATIENT
Start: 2017-07-05 | End: 2017-07-12 | Stop reason: SDUPTHER

## 2017-07-12 ENCOUNTER — OFFICE VISIT (OUTPATIENT)
Dept: FAMILY MEDICINE CLINIC | Age: 72
End: 2017-07-12

## 2017-07-12 VITALS
HEART RATE: 63 BPM | RESPIRATION RATE: 18 BRPM | DIASTOLIC BLOOD PRESSURE: 62 MMHG | SYSTOLIC BLOOD PRESSURE: 111 MMHG | TEMPERATURE: 97.9 F | OXYGEN SATURATION: 96 % | HEIGHT: 64 IN

## 2017-07-12 DIAGNOSIS — Z01.818 PREOP EXAMINATION: ICD-10-CM

## 2017-07-12 DIAGNOSIS — J44.9 CHRONIC OBSTRUCTIVE PULMONARY DISEASE, UNSPECIFIED COPD TYPE (HCC): Primary | ICD-10-CM

## 2017-07-12 DIAGNOSIS — R05.3 CHRONIC COUGH: ICD-10-CM

## 2017-07-12 RX ORDER — HYDROCODONE POLISTIREX AND CHLORPHENIRAMINE POLISTIREX 10; 8 MG/5ML; MG/5ML
5 SUSPENSION, EXTENDED RELEASE ORAL
Qty: 200 ML | Refills: 0 | Status: SHIPPED | OUTPATIENT
Start: 2017-07-12 | End: 2017-11-10 | Stop reason: ALTCHOICE

## 2017-07-12 NOTE — PROGRESS NOTES
Reena Dan is a 67 y.o. female is here for pre op, they have the paperwork with them. 1. Have you been to the ER, urgent care clinic since your last visit? Hospitalized since your last visit? No    2. Have you seen or consulted any other health care providers outside of the [address] [place] [address] since your last visit? Include any pap smears or colon screening.  No     Health Maintenance Due   Topic Date Due    Hepatitis C Screening  1945    COLONOSCOPY  05/02/1963    DTaP/Tdap/Td series (1 - Tdap) 05/02/1966    ZOSTER VACCINE AGE 60>  05/02/2005    MEDICARE YEARLY EXAM  06/24/2016

## 2017-07-12 NOTE — MR AVS SNAPSHOT
Visit Information Date & Time Provider Department Dept. Phone Encounter #  
 7/12/2017  2:30 PM Kortney Cyr MD 3 [facility] [phone] [id number] Follow-up Instructions Return to be determined. Follow-up and Disposition History Upcoming Health Maintenance Date Due Hepatitis C Screening 1945 COLONOSCOPY 5/2/1963 DTaP/Tdap/Td series (1 - Tdap) 5/2/1966 ZOSTER VACCINE AGE 60> 5/2/2005 MEDICARE YEARLY EXAM 6/24/2016 INFLUENZA AGE 9 TO ADULT 8/1/2017 Pneumococcal 65+ Low/Medium Risk (2 of 2 - PPSV23) 10/31/2017 BREAST CANCER SCRN MAMMOGRAM 6/13/2019 GLAUCOMA SCREENING Q2Y 6/26/2019 Allergies as of 7/12/2017  Review Complete On: 7/12/2017 By: Kortney Cyr MD  
  
 Severity Noted Reaction Type Reactions Amoxicillin  08/23/2010    Unknown (comments) Avelox [Moxifloxacin]  08/23/2010    Unknown (comments) Celexa [Citalopram]  08/23/2010    Unknown (comments) Ciprofloxacin  08/23/2010    Unknown (comments) Doxycycline  01/17/2017    Nausea and Vomiting Levaquin [Levofloxacin]  08/23/2010    Unknown (comments) Paxil [Paroxetine Hcl]  08/23/2010    Unknown (comments) Sulfa (Sulfonamide Antibiotics)  08/23/2010    Unknown (comments) Joints ache Tequin [Gatifloxacin]  08/23/2010    Unknown (comments) Current Immunizations  Reviewed on 10/31/2016 Name Date Influenza High Dose Vaccine PF 10/31/2016  2:06 PM, 10/20/2015 Influenza Vaccine 10/22/2013 Influenza Vaccine (Quad) 11/24/2014 12:17 PM  
 Influenza Vaccine Split 11/16/2011 Not reviewed this visit You Were Diagnosed With   
  
 Codes Comments Chronic obstructive pulmonary disease, unspecified COPD type [facility]    -  Primary ICD-10-CM: J44.9 ICD-9-CM: 595 Preop examination     ICD-10-CM: F15.101 ICD-9-CM: V72.84 Chronic cough     ICD-10-CM: R05 ICD-9-CM: 038. 2 Vitals BP Pulse Temp Resp Height(growth percentile) SpO2  
 111/62 (BP 1 Location: Left arm, BP Patient Position: Sitting) 63 97.9 °F (36.6 °C) (Oral) 18 5' 4\" (1.626 m) 96% OB Status Smoking Status Postmenopausal Never Smoker Preferred Pharmacy Pharmacy Name Phone [facility] [address], [address] [phone] Your Updated Medication List  
  
   
This list is accurate as of: 7/12/17  2:42 PM.  Always use your most recent med list.  
  
  
  
  
 albuterol-ipratropium 2.5 mg-0.5 mg/3 ml Nebu Commonly known as:  DUO-NEB  
3 mL by Nebulization route every six (6) hours as needed. Use 1 vial t.i.d. Via nebulizer prn  
  
 ammonium lactate 12 % lotion Commonly known as:  LAC-HYDRIN  
rub in to affected area well twice a day as needed  
  
 betamethasone dipropionate 0.05 % topical cream  
Commonly known as:  Elsie Ast Apply  to affected area two (2) times a day. budesonide 0.5 mg/2 mL Nbsp Commonly known as:  PULMICORT  
2 mL by Nebulization route two (2) times a day. CALTRATE 600+D PLUS MINERALS 600 mg calcium- 400 unit Tab Generic drug:  Calcium Carbonate-Vit D3-Min Take  by mouth daily. CENTRUM SILVER Tab tablet Generic drug:  multivitamins-minerals-lutein Take  by mouth daily. * chlorpheniramine-HYDROcodone 10-8 mg/5 mL suspension Commonly known as:  Mervat Terry Take 5 mL by mouth every twelve (12) hours as needed for Cough. Max Daily Amount: 10 mL. * chlorpheniramine-HYDROcodone 10-8 mg/5 mL suspension Commonly known as:  Mervat Terry Take 5 mL by mouth every twelve (12) hours as needed for Cough. Max Daily Amount: 10 mL. May fill on 08/05/17  
  
 clonazePAM 0.5 mg tablet Commonly known as:  Milo Manzanilla Take 1 Tab by mouth two (2) times a day. Max Daily Amount: 1 mg.  
  
 desloratadine 5 mg tablet Commonly known as:  CLARINEX Take 1 Tab by mouth daily. erythromycin 500 mg tablet Take 150 mg by mouth three (3) times daily. estrogen (conjugated)-medroxyPROGESTERone 0.625-2.5 mg per tablet Commonly known as:  Nadya Prime Take 1 Tab by mouth daily. guaiFENesin 1,200 mg Ta12 ER tablet Commonly known as:  Deni ePrep Deni Take 1 Tab by mouth two (2) times a day. HYDROmorphone 1 mg/mL Liqd oral solution Commonly known as:  DILAUDID  
4 ml 1 hour before surgery as directed  
  
 hydrOXYzine HCl 25 mg tablet Commonly known as:  ATARAX Take 1 Tab by mouth three (3) times daily as needed for Itching. levothyroxine 100 mcg tablet Commonly known as:  SYNTHROID Take 1 Tab by mouth Daily (before breakfast). LORazepam 1 mg tablet Commonly known as:  ATIVAN  
1 every 8 hours for breakthrough anxiety  
  
 melatonin 3 mg tablet Take 3 mg by mouth nightly. metoprolol tartrate 25 mg tablet Commonly known as:  LOPRESSOR  
TAKE 1 TABLET BY MOUTH TWICE DAILY  
  
 OTHER Test INR as directed I82.629  
  
 oxyCODONE IR 10 mg Tab immediate release tablet Commonly known as:  Conchetta Little Take 20 mg by mouth every four (4) hours as needed. Saliva Substitution Combo No.3 Spra 1-2 sprays 3 times daily as directed  
  
 vancomycin in 0.9% sodium Cl 750 mg/150 mL Pgbk  
by IntraVENous route. VITAMIN D3 1,000 unit Cap Generic drug:  cholecalciferol Take  by mouth two (2) times a day. warfarin 1 mg tablet Commonly known as:  COUMADIN Take 1 Tab by mouth daily. * Notice: This list has 2 medication(s) that are the same as other medications prescribed for you. Read the directions carefully, and ask your doctor or other care provider to review them with you. Prescriptions Printed Refills  
 chlorpheniramine-HYDROcodone (TUSSIONEX) 10-8 mg/5 mL suspension 0 Sig: Take 5 mL by mouth every twelve (12) hours as needed for Cough.  Max Daily Amount: 10 mL. May fill on 08/05/17 Class: Print Route: Oral  
  
Follow-up Instructions Return to be determined. Introducing [facility]! Dear Saul Hand: Thank you for requesting a Newzstand account. Our records indicate that you already have an active Newzstand account. You can access your account anytime at [email or web address] MD/My Ad Box Did you know that you can access your hospital and ER discharge instructions at any time in Newzstand? You can also review all of your test results from your hospital stay or ER visit. Additional Information If you have questions, please visit the Frequently Asked Questions section of the Newzstand website at [email or web address] MD/My Ad [email or web address] Remember, Newzstand is NOT to be used for urgent needs. For medical emergencies, dial 911. Now available from your iPhone and Android! Please provide this summary of care documentation to your next provider. Your primary care clinician is listed as Taylor Cantu. If you have any questions after today's visit, please call [phone].

## 2017-07-12 NOTE — PROGRESS NOTES
Preoperative Evaluation    Date of Exam: 2017    Neela Sheriff is a 67 y.o. female (:1945) who presents for preoperative evaluation. Procedure/Surgery: cataract removal  Date of Procedure/Surgery: 2017  Surgeon: Dr. Kristin Rudolph: [facility]  Primary Physician: Samuel David MD  Latex Allergy: no    Problem List:     Patient Active Problem List    Diagnosis Date Noted    Closed fracture of left humerus 10/07/2016    Asthma 10/07/2016    Hypothyroidism 2014    Anxiety state 2011    Polyarticular juvenile rheumatoid arthritis, chronic ([facility] 2009    Esophageal reflux 2008     Medical History:     Past Medical History:   Diagnosis Date    Bronchiolitis     Cough     GERD (gastroesophageal reflux disease)     went for surgery for it    Hypertension     [facility]      Allergies: Allergies   Allergen Reactions    Amoxicillin Unknown (comments)    Avelox [Moxifloxacin] Unknown (comments)    Celexa [Citalopram] Unknown (comments)    Ciprofloxacin Unknown (comments)    Doxycycline Nausea and Vomiting    Levaquin [Levofloxacin] Unknown (comments)    Paxil [Paroxetine Hcl] Unknown (comments)    Sulfa (Sulfonamide Antibiotics) Unknown (comments)     Joints ache      Tequin [Gatifloxacin] Unknown (comments)      Medications:     Current Outpatient Prescriptions   Medication Sig    chlorpheniramine-HYDROcodone (TUSSIONEX) 10-8 mg/5 mL suspension Take 5 mL by mouth every twelve (12) hours as needed for Cough. Max Daily Amount: 10 mL.  chlorpheniramine-HYDROcodone (TUSSIONEX) 10-8 mg/5 mL suspension Take 5 mL by mouth every twelve (12) hours as needed for Cough. Max Daily Amount: 10 mL.  VANCOMYCIN/0.9 % SOD CHLORIDE (VANCOMYCIN IN 0.9% SODIUM CL) 750 mg/150 mL pgbk by IntraVENous route.  warfarin (COUMADIN) 1 mg tablet Take 1 Tab by mouth daily.  (Patient taking differently: Take 1 mg by mouth daily. Indications: patient taking 3 mg per day)    OTHER Test INR as directed  I82.629    metoprolol tartrate (LOPRESSOR) 25 mg tablet TAKE 1 TABLET BY MOUTH TWICE DAILY    HYDROmorphone (DILAUDID) 1 mg/mL liqd oral solution 4 ml 1 hour before surgery as directed    Saliva Substitution Combo No.3 spra 1-2 sprays 3 times daily as directed    albuterol-ipratropium (DUO-NEB) 2.5 mg-0.5 mg/3 ml nebu 3 mL by Nebulization route every six (6) hours as needed. Use 1 vial t.i.d. Via nebulizer prn    guaiFENesin (MUCINEX) 1,200 mg Ta12 ER tablet Take 1 Tab by mouth two (2) times a day.  desloratadine (CLARINEX) 5 mg tablet Take 1 Tab by mouth daily.  clonazePAM (KLONOPIN) 0.5 mg tablet Take 1 Tab by mouth two (2) times a day. Max Daily Amount: 1 mg.  cholecalciferol (VITAMIN D3) 1,000 unit cap Take  by mouth two (2) times a day.  erythromycin 500 mg tablet Take 150 mg by mouth three (3) times daily.  melatonin 3 mg tablet Take 3 mg by mouth nightly.  LORazepam (ATIVAN) 1 mg tablet 1 every 8 hours for breakthrough anxiety    hydrOXYzine (ATARAX) 25 mg tablet Take 1 Tab by mouth three (3) times daily as needed for Itching.  ammonium lactate (LAC-HYDRIN) 12 % lotion rub in to affected area well twice a day as needed    budesonide (PULMICORT) 0.5 mg/2 mL nbsp 2 mL by Nebulization route two (2) times a day. (Patient taking differently: 500 mcg by Nebulization route two (2) times daily as needed.)    levothyroxine (SYNTHROID) 100 mcg tablet Take 1 Tab by mouth Daily (before breakfast).  betamethasone dipropionate (DIPROSONE) 0.05 % topical cream Apply  to affected area two (2) times a day. (Patient taking differently: Apply  to affected area as needed.)    estrogen, conjugated,-medroxyPROGESTERone (PREMPRO) 0.625-2.5 mg per tablet Take 1 Tab by mouth daily.  oxyCODONE IR (ROXICODONE) 10 mg tab immediate release tablet Take 20 mg by mouth every four (4) hours as needed.     Calcium Carbonate-Vit D3-Min (CALTRATE 600+D PLUS MINERALS) 600 mg calcium- 400 unit Tab Take  by mouth daily.  multivitamins-minerals-lutein (CENTRUM SILVER) Tab Take  by mouth daily. No current facility-administered medications for this visit. Surgical History:     Past Surgical History:   Procedure Laterality Date    ELBOW ARTHROSCOP,REMV LOOSE BODY      HX HEENT      HX KNEE REPLACEMENT      Right    HX ORTHOPAEDIC      Left knee fracture    HX ORTHOPAEDIC      Humerus fracture    TOTAL HIP ARTHROPLASTY       Social History:     Social History     Social History    Marital status:      Spouse name: N/A    Number of children: N/A    Years of education: N/A     Social History Main Topics    Smoking status: Never Smoker    Smokeless tobacco: Never Used    Alcohol use No    Drug use: No    Sexual activity: Not Asked     Other Topics Concern    None     Social History Narrative       Recent use of: No recent use of aspirin (ASA), NSAIDS or steroids    Tetanus up to date: tetanus status unknown to the patient      Anesthesia Complications: None  History of abnormal bleeding : None  History of Blood Transfusions: yes  Health Care Directive or Living Will: yes    REVIEW OF SYSTEMS:  A comprehensive review of systems was negative except for: Respiratory: positive for cough    EXAM:   Visit Vitals    /62 (BP 1 Location: Left arm, BP Patient Position: Sitting)    Pulse 63    Temp 97.9 °F (36.6 °C) (Oral)    Resp 18    Ht 5' 4\" (1.626 m)    SpO2 96%     General appearance - alert, well appearing, and in no distress, oriented to person, place, and time, normal appearing weight and well hydrated  Mental status - alert, oriented to person, place, and time  Chest - wheezing noted blaterally  Heart - normal rate, regular rhythm, normal S1, S2, no murmurs, rubs, clicks or gallops      DIAGNOSTICS:   1. EKG: EKG FINDINGS - n/a  2. CXR: n/a  3.  Labs: n/a    IMPRESSION:   Chronic pulmonary disease  No contraindications to planned surgery  Recommend Tussionex 1 TSP 1 hour pre-op due to cough    Erna Lopez MD   7/12/2017

## 2017-08-11 ENCOUNTER — TELEPHONE (OUTPATIENT)
Dept: FAMILY MEDICINE CLINIC | Age: 72
End: 2017-08-11

## 2017-08-11 ENCOUNTER — HOSPITAL ENCOUNTER (OUTPATIENT)
Dept: LAB | Age: 72
Discharge: HOME | End: 2017-08-11
Payer: MEDICARE

## 2017-08-11 DIAGNOSIS — J44.1 COPD EXACERBATION (HCC): Primary | ICD-10-CM

## 2017-08-11 DIAGNOSIS — J44.1 COPD EXACERBATION (HCC): ICD-10-CM

## 2017-08-11 PROCEDURE — 87186 SC STD MICRODIL/AGAR DIL: CPT | Performed by: INTERNAL MEDICINE

## 2017-08-11 PROCEDURE — 87077 CULTURE AEROBIC IDENTIFY: CPT | Performed by: INTERNAL MEDICINE

## 2017-08-11 PROCEDURE — 87070 CULTURE OTHR SPECIMN AEROBIC: CPT | Performed by: INTERNAL MEDICINE

## 2017-08-11 PROCEDURE — 87106 FUNGI IDENTIFICATION YEAST: CPT | Performed by: INTERNAL MEDICINE

## 2017-08-11 RX ORDER — AZITHROMYCIN 500 MG/1
500 TABLET, FILM COATED ORAL DAILY
Qty: 6 TAB | Refills: 0 | Status: SHIPPED | OUTPATIENT
Start: 2017-08-11 | End: 2017-08-17

## 2017-08-11 RX ORDER — METHYLPREDNISOLONE 4 MG/1
TABLET ORAL
Qty: 1 DOSE PACK | Refills: 0 | Status: SHIPPED | OUTPATIENT
Start: 2017-08-11 | End: 2017-11-10 | Stop reason: ALTCHOICE

## 2017-08-15 NOTE — PROGRESS NOTES
Patient states she is still coughing, states he has only gotten a little better. States she is still taking the medicine that you gave her.  Scheduled for Friday

## 2017-08-17 LAB
BACTERIA SPEC CULT: ABNORMAL
GRAM STN SPEC: ABNORMAL
SERVICE CMNT-IMP: ABNORMAL

## 2017-08-18 ENCOUNTER — OFFICE VISIT (OUTPATIENT)
Dept: FAMILY MEDICINE CLINIC | Age: 72
End: 2017-08-18

## 2017-08-18 VITALS
SYSTOLIC BLOOD PRESSURE: 122 MMHG | RESPIRATION RATE: 18 BRPM | TEMPERATURE: 98.1 F | BODY MASS INDEX: 22.2 KG/M2 | HEART RATE: 90 BPM | DIASTOLIC BLOOD PRESSURE: 67 MMHG | HEIGHT: 64 IN | OXYGEN SATURATION: 96 % | WEIGHT: 130 LBS

## 2017-08-18 DIAGNOSIS — Z22.322 MRSA CARRIER: ICD-10-CM

## 2017-08-18 DIAGNOSIS — J44.0 CHRONIC OBSTRUCTIVE PULMONARY DISEASE WITH ACUTE LOWER RESPIRATORY INFECTION (HCC): Primary | ICD-10-CM

## 2017-08-18 RX ORDER — DOXEPIN HYDROCHLORIDE 50 MG/G
CREAM TOPICAL 3 TIMES DAILY
Qty: 30 G | Refills: 0 | Status: SHIPPED | OUTPATIENT
Start: 2017-08-18 | End: 2018-04-30 | Stop reason: SDUPTHER

## 2017-08-18 RX ORDER — ACETYLCYSTEINE 100 MG/ML
4 SOLUTION ORAL; RESPIRATORY (INHALATION) 2 TIMES DAILY
Qty: 120 ML | Refills: 0 | Status: SHIPPED | OUTPATIENT
Start: 2017-08-18

## 2017-08-18 RX ORDER — RIFAMPIN 300 MG/1
300 CAPSULE ORAL
Qty: 20 CAP | Refills: 0 | Status: SHIPPED | OUTPATIENT
Start: 2017-08-18 | End: 2017-08-28

## 2017-08-18 RX ORDER — LEVALBUTEROL INHALATION SOLUTION 1.25 MG/3ML
1.25 SOLUTION RESPIRATORY (INHALATION)
Qty: 3 ML | Refills: 0
Start: 2017-08-18 | End: 2017-08-18

## 2017-08-18 NOTE — PROGRESS NOTES
HISTORY OF PRESENT ILLNESS  Giovanna Lara is a 67 y.o. female. HPI  Copd, recent flare, improved after z-rama  sputum cx showed MRSA, multiresistant  Review of Systems   Respiratory: Positive for cough, sputum production, shortness of breath and wheezing. All other systems reviewed and are negative. Past Medical History:   Diagnosis Date    Bronchiolitis     Cough     GERD (gastroesophageal reflux disease)     went for surgery for it    Hypertension     JRA (juvenile rheumatoid arthritis) ([facility] [address]      Current Outpatient Prescriptions on File Prior to Visit   Medication Sig Dispense Refill    methylPREDNISolone (MEDROL DOSEPACK) 4 mg tablet Take as directed 1 Dose Pack 0    chlorpheniramine-HYDROcodone (TUSSIONEX) 10-8 mg/5 mL suspension Take 5 mL by mouth every twelve (12) hours as needed for Cough. Max Daily Amount: 10 mL. May fill on 08/05/17 200 mL 0    chlorpheniramine-HYDROcodone (TUSSIONEX) 10-8 mg/5 mL suspension Take 5 mL by mouth every twelve (12) hours as needed for Cough. Max Daily Amount: 10 mL. 200 mL 0    VANCOMYCIN/0.9 % SOD CHLORIDE (VANCOMYCIN IN 0.9% SODIUM CL) 750 mg/150 mL pgbk by IntraVENous route.  warfarin (COUMADIN) 1 mg tablet Take 1 Tab by mouth daily. (Patient taking differently: Take 1 mg by mouth daily. Indications: patient taking 3 mg per day) 30 Tab 3    OTHER Test INR as directed  I82.629 1 Each 0    metoprolol tartrate (LOPRESSOR) 25 mg tablet TAKE 1 TABLET BY MOUTH TWICE DAILY 180 Tab 0    HYDROmorphone (DILAUDID) 1 mg/mL liqd oral solution 4 ml 1 hour before surgery as directed 8 mL 0    Saliva Substitution Combo No.3 spra 1-2 sprays 3 times daily as directed 40 mL 3    albuterol-ipratropium (DUO-NEB) 2.5 mg-0.5 mg/3 ml nebu 3 mL by Nebulization route every six (6) hours as needed. Use 1 vial t.i.d. Via nebulizer prn 360 Nebule 1    guaiFENesin (MUCINEX) 1,200 mg Ta12 ER tablet Take 1 Tab by mouth two (2) times a day.  20 Tab 0    desloratadine (CLARINEX) 5 mg tablet Take 1 Tab by mouth daily. 30 Tab 0    clonazePAM (KLONOPIN) 0.5 mg tablet Take 1 Tab by mouth two (2) times a day. Max Daily Amount: 1 mg. 30 Tab 0    cholecalciferol (VITAMIN D3) 1,000 unit cap Take  by mouth two (2) times a day.  erythromycin 500 mg tablet Take 150 mg by mouth three (3) times daily.  melatonin 3 mg tablet Take 3 mg by mouth nightly.  LORazepam (ATIVAN) 1 mg tablet 1 every 8 hours for breakthrough anxiety 40 Tab 0    hydrOXYzine (ATARAX) 25 mg tablet Take 1 Tab by mouth three (3) times daily as needed for Itching. 60 Tab 3    ammonium lactate (LAC-HYDRIN) 12 % lotion rub in to affected area well twice a day as needed 400 mL 3    budesonide (PULMICORT) 0.5 mg/2 mL nbsp 2 mL by Nebulization route two (2) times a day. (Patient taking differently: 500 mcg by Nebulization route two (2) times daily as needed.) 60 Each 3    levothyroxine (SYNTHROID) 100 mcg tablet Take 1 Tab by mouth Daily (before breakfast). 90 Tab 3    betamethasone dipropionate (DIPROSONE) 0.05 % topical cream Apply  to affected area two (2) times a day. (Patient taking differently: Apply  to affected area as needed.) 30 g 3    estrogen, conjugated,-medroxyPROGESTERone (PREMPRO) 0.625-2.5 mg per tablet Take 1 Tab by mouth daily. 90 Tab 1    oxyCODONE IR (ROXICODONE) 10 mg tab immediate release tablet Take 20 mg by mouth every four (4) hours as needed.  Calcium Carbonate-Vit D3-Min (CALTRATE 600+D PLUS MINERALS) 600 mg calcium- 400 unit Tab Take  by mouth daily.  multivitamins-minerals-lutein (CENTRUM SILVER) Tab Take  by mouth daily.  [] azithromycin (ZITHROMAX) 500 mg tab Take 1 Tab by mouth daily for 6 days. 6 Tab 0     No current facility-administered medications on file prior to visit.       Visit Vitals    /67 (BP 1 Location: Right arm, BP Patient Position: Sitting)    Pulse 90    Temp 98.1 °F (36.7 °C) (Oral)    Resp 18    Ht 5' 4\" (1.626 m)    Wt 130 lb (59 kg)  SpO2 96%    BMI 22.31 kg/m2         Physical Exam   Constitutional: She appears well-developed and well-nourished. No distress. Neck: Normal range of motion. Neck supple. No thyromegaly present. Cardiovascular: Normal rate, regular rhythm, normal heart sounds and intact distal pulses. Exam reveals no gallop and no friction rub. No murmur heard. Pulmonary/Chest: She is in respiratory distress. She has wheezes. She has no rales. She exhibits no tenderness. Wheezing insp/exp x 2   Lymphadenopathy:     She has no cervical adenopathy. Skin: She is not diaphoretic. Vitals reviewed.   cxr- nothing acute    ASSESSMENT and PLAN  copd   mrsa  Plan  Cbc  In absence of active pulmonary disease counseled patient  Will simply do good toilet and reculture  Consider rifampin  Trial of mucomyst  Recheck in 2 weeks

## 2017-08-18 NOTE — MR AVS SNAPSHOT
Visit Information Date & Time Provider Department Dept. Phone Encounter #  
 8/18/2017 11:30 AM Mayco Stephenson MD [address] [place] [address] [phone] [id number] Follow-up Instructions Return in about 2 weeks (around 9/1/2017). Upcoming Health Maintenance Date Due Hepatitis C Screening 1945 COLONOSCOPY 5/2/1963 DTaP/Tdap/Td series (1 - Tdap) 5/2/1966 ZOSTER VACCINE AGE 60> 3/2/2005 MEDICARE YEARLY EXAM 6/24/2016 INFLUENZA AGE 9 TO ADULT 8/1/2017 Pneumococcal 65+ Low/Medium Risk (2 of 2 - PPSV23) 10/31/2017 BREAST CANCER SCRN MAMMOGRAM 6/13/2019 GLAUCOMA SCREENING Q2Y 6/26/2019 Allergies as of 8/18/2017  Review Complete On: 8/18/2017 By: Mayco Stephenson MD  
  
 Severity Noted Reaction Type Reactions Amoxicillin  08/23/2010    Unknown (comments) Avelox [Moxifloxacin]  08/23/2010    Unknown (comments) Celexa [Citalopram]  08/23/2010    Unknown (comments) Ciprofloxacin  08/23/2010    Unknown (comments) Doxycycline  01/17/2017    Nausea and Vomiting Levaquin [Levofloxacin]  08/23/2010    Unknown (comments) Paxil [Paroxetine Hcl]  08/23/2010    Unknown (comments) Sulfa (Sulfonamide Antibiotics)  08/23/2010    Unknown (comments) Joints ache Tequin [Gatifloxacin]  08/23/2010    Unknown (comments) Current Immunizations  Reviewed on 10/31/2016 Name Date Influenza High Dose Vaccine PF 10/31/2016  2:06 PM, 10/20/2015 Influenza Vaccine 10/22/2013 Influenza Vaccine (Quad) 11/24/2014 12:17 PM  
 Influenza Vaccine Split 11/16/2011 Not reviewed this visit You Were Diagnosed With   
  
 Codes Comments Chronic obstructive pulmonary disease with acute lower respiratory infection ([facility] [address]    -  Primary ICD-10-CM: J44.0 ICD-9-CM: 496, 519.8 MRSA carrier     ICD-10-CM: Z22.200 ICD-9-CM: V02.54 Vitals BP Pulse Temp Resp Height(growth percentile) Weight(growth percentile) 122/67 (BP 1 Location: Right arm, BP Patient Position: Sitting) 90 98.1 °F (36.7 °C) (Oral) 18 5' 4\" (1.626 m) 130 lb (59 kg) SpO2 BMI OB Status Smoking Status 96% 22.31 kg/m2 Postmenopausal Never Smoker BMI and BSA Data Body Mass Index Body Surface Area  
 22.31 kg/m 2 1.63 m 2 Preferred Pharmacy Pharmacy Name Phone [facility] [address], [address] [phone] Your Updated Medication List  
  
   
This list is accurate as of: 8/18/17 12:27 PM.  Always use your most recent med list.  
  
  
  
  
 acetylcysteine 100 mg/mL (10 %) nebulizer solution Commonly known as:  MUCOMYST Take 4 mL by inhalation two (2) times a day. albuterol-ipratropium 2.5 mg-0.5 mg/3 ml Nebu Commonly known as:  DUO-NEB  
3 mL by Nebulization route every six (6) hours as needed. Use 1 vial t.i.d. Via nebulizer prn  
  
 ammonium lactate 12 % lotion Commonly known as:  LAC-HYDRIN  
rub in to affected area well twice a day as needed  
  
 betamethasone dipropionate 0.05 % topical cream  
Commonly known as:  Sharlee Osler Apply  to affected area two (2) times a day. budesonide 0.5 mg/2 mL Nbsp Commonly known as:  PULMICORT  
2 mL by Nebulization route two (2) times a day. CALTRATE 600+D PLUS MINERALS 600 mg calcium- 400 unit Tab Generic drug:  Calcium Carbonate-Vit D3-Min Take  by mouth daily. CENTRUM SILVER Tab tablet Generic drug:  multivitamins-minerals-lutein Take  by mouth daily. * chlorpheniramine-HYDROcodone 10-8 mg/5 mL suspension Commonly known as:  Jacoby Sinning Take 5 mL by mouth every twelve (12) hours as needed for Cough. Max Daily Amount: 10 mL. * chlorpheniramine-HYDROcodone 10-8 mg/5 mL suspension Commonly known as:  Jacoby Sinning Take 5 mL by mouth every twelve (12) hours as needed for Cough. Max Daily Amount: 10 mL. May fill on 08/05/17 clonazePAM 0.5 mg tablet Commonly known as:  Lucendia Demark Take 1 Tab by mouth two (2) times a day. Max Daily Amount: 1 mg.  
  
 desloratadine 5 mg tablet Commonly known as:  CLARINEX Take 1 Tab by mouth daily. erythromycin 500 mg tablet Take 150 mg by mouth three (3) times daily. estrogen (conjugated)-medroxyPROGESTERone 0.625-2.5 mg per tablet Commonly known as:  Chase Feeler Take 1 Tab by mouth daily. guaiFENesin 1,200 mg Ta12 ER tablet Commonly known as:  [employer] Deni Take 1 Tab by mouth two (2) times a day. HYDROmorphone 1 mg/mL Liqd oral solution Commonly known as:  DILAUDID  
4 ml 1 hour before surgery as directed  
  
 hydrOXYzine HCl 25 mg tablet Commonly known as:  ATARAX Take 1 Tab by mouth three (3) times daily as needed for Itching. levalbuterol 1.25 mg/3 mL Nebu Commonly known as:  XOPENEX  
3 mL by Nebulization route now for 1 dose. levothyroxine 100 mcg tablet Commonly known as:  SYNTHROID Take 1 Tab by mouth Daily (before breakfast). LORazepam 1 mg tablet Commonly known as:  ATIVAN  
1 every 8 hours for breakthrough anxiety  
  
 melatonin 3 mg tablet Take 3 mg by mouth nightly. methylPREDNISolone 4 mg tablet Commonly known as:  Sharlynn Levo Take as directed  
  
 metoprolol tartrate 25 mg tablet Commonly known as:  LOPRESSOR  
TAKE 1 TABLET BY MOUTH TWICE DAILY  
  
 OTHER Test INR as directed I82.629  
  
 oxyCODONE IR 10 mg Tab immediate release tablet Commonly known as:  Onita Estimable Take 20 mg by mouth every four (4) hours as needed. rifAMPin 300 mg capsule Commonly known as:  RIFADIN Take 1 Cap by mouth Before breakfast and dinner for 10 days. Saliva Substitution Combo No.3 Spra 1-2 sprays 3 times daily as directed  
  
 vancomycin in 0.9% sodium Cl 750 mg/150 mL Pgbk  
by IntraVENous route. VITAMIN D3 1,000 unit Cap Generic drug:  cholecalciferol Take  by mouth two (2) times a day. warfarin 1 mg tablet Commonly known as:  COUMADIN Take 1 Tab by mouth daily. ZONALON, PRUDOXIN 5 % topical cream  
Commonly known as:  doxepin (ZONALON, PRUDOXIN) 5% cream  
Apply  to affected area three (3) times daily. * Notice: This list has 2 medication(s) that are the same as other medications prescribed for you. Read the directions carefully, and ask your doctor or other care provider to review them with you. Prescriptions Sent to Pharmacy Refills  
 rifAMPin (RIFADIN) 300 mg capsule 0 Sig: Take 1 Cap by mouth Before breakfast and dinner for 10 days. Class: Normal  
 Pharmacy: [facility] [address], [address] [place] [address] Ph #: [phone] Route: Oral  
 acetylcysteine (MUCOMYST) 100 mg/mL (10 %) nebulizer solution 0 Sig: Take 4 mL by inhalation two (2) times a day. Class: Normal  
 Pharmacy: [facility] [address], [address] [place] [address] Ph #: [phone] Route: Inhalation ZONALON, PRUDOXIN (DOXEPIN, ZONALON, PRUDOXIN, 5% CREAM) 5 % topical cream 0 Sig: Apply  to affected area three (3) times daily. Class: Normal  
 Pharmacy: [facility] [address], [address] [place] [address] Ph #: [phone] Route: Topical  
  
We Performed the Following INHAL RX, AIRWAY OBST/DX SPUTUM INDUCT [id number] CPT(R)] LEVALBUTEROL, INHAL. SOL., FDA-APPROVED FINAL, NON-COMPOUND UNIT DOSE, 0.5 MG [ [facility]] VA INHAL RX, AIRWAY OBST/DX SPUTUM INDUCT [id number] CPT(R)] Follow-up Instructions Return in about 2 weeks (around 9/1/2017). To-Do List   
 08/18/2017 Imaging:  XR CHEST PA LAT Introducing [facility]! Dear Jr Marie: Thank you for requesting a [employer] account.   Our records indicate that you already have an active [employer] account. You can access your account anytime at [email or web address] Did you know that you can access your hospital and ER discharge instructions at any time in [employer]? You can also review all of your test results from your hospital stay or ER visit. Additional Information If you have questions, please visit the Frequently Asked Questions section of the [employer] website at [email or web address] Remember, [employer] is NOT to be used for urgent needs. For medical emergencies, dial 911. Now available from your iPhone and Android! Please provide this summary of care documentation to your next provider. Your primary care clinician is listed as Luci Spear. If you have any questions after today's visit, please call [phone].

## 2017-08-18 NOTE — PROGRESS NOTES
Petar Velazquez is a 67 y.o. female is here to follow up on breating difficulties following cataract surgery. 1. Have you been to the ER, urgent care clinic since your last visit? Hospitalized since your last visit? outpatient for cataract surgerty     2. Have you seen or consulted any other health care providers outside of the [address] [place] [address] since your last visit? Include any pap smears or colon screening.  cataract surgery     Health Maintenance Due   Topic Date Due    Hepatitis C Screening  1945    COLONOSCOPY  05/02/1963    DTaP/Tdap/Td series (1 - Tdap) 05/02/1966    ZOSTER VACCINE AGE 60>  03/02/2005    MEDICARE YEARLY EXAM  06/24/2016    INFLUENZA AGE 9 TO ADULT  08/01/2017     \

## 2017-08-28 ENCOUNTER — TELEPHONE (OUTPATIENT)
Dept: FAMILY MEDICINE CLINIC | Age: 72
End: 2017-08-28

## 2017-08-28 NOTE — TELEPHONE ENCOUNTER
OK, but she needs to be clear on the fact I cannot prescribe opiates for her chronic pain, and most pain mgmt are quite booked for new patiewnts

## 2017-08-28 NOTE — TELEPHONE ENCOUNTER
Pt called to ask for a new referral for Pain Management according to patient there was a slight confrontation in regards to her co-pay at the last office she was referred to and they no longer wish to see her.      Patient currently scheduled for an office visit on 9-1-17

## 2017-08-29 NOTE — TELEPHONE ENCOUNTER
Patient was notified. Patient stated she was seeing Dr Loki Mina and they informed her that she was being discharged from their practice d/t being rude. 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

## 2017-08-30 ENCOUNTER — TELEPHONE (OUTPATIENT)
Dept: FAMILY MEDICINE CLINIC | Age: 72
End: 2017-08-30

## 2017-08-31 NOTE — TELEPHONE ENCOUNTER
Called Rana Kawasaki back, she states Dr. Benson Bey is discharging patient due to patient being very rude and disrespectful to staff and dr Benson Bey, states pt has been RX meds to last to November.

## 2017-09-01 ENCOUNTER — HOSPITAL ENCOUNTER (OUTPATIENT)
Dept: LAB | Age: 72
Discharge: HOME | End: 2017-09-01
Payer: MEDICARE

## 2017-09-01 ENCOUNTER — OFFICE VISIT (OUTPATIENT)
Dept: FAMILY MEDICINE CLINIC | Age: 72
End: 2017-09-01

## 2017-09-01 VITALS
TEMPERATURE: 97.3 F | RESPIRATION RATE: 18 BRPM | DIASTOLIC BLOOD PRESSURE: 60 MMHG | OXYGEN SATURATION: 95 % | HEIGHT: 64 IN | WEIGHT: 119.2 LBS | HEART RATE: 104 BPM | SYSTOLIC BLOOD PRESSURE: 131 MMHG | BODY MASS INDEX: 20.35 KG/M2

## 2017-09-01 DIAGNOSIS — G89.29 CHRONIC LOW BACK PAIN, UNSPECIFIED BACK PAIN LATERALITY, WITH SCIATICA PRESENCE UNSPECIFIED: ICD-10-CM

## 2017-09-01 DIAGNOSIS — J44.1 COPD EXACERBATION (HCC): Primary | ICD-10-CM

## 2017-09-01 DIAGNOSIS — J44.1 COPD EXACERBATION (HCC): ICD-10-CM

## 2017-09-01 DIAGNOSIS — M08.3 POLYARTICULAR JUVENILE RHEUMATOID ARTHRITIS, CHRONIC (HCC): Chronic | ICD-10-CM

## 2017-09-01 DIAGNOSIS — A49.02 MRSA INFECTION: ICD-10-CM

## 2017-09-01 DIAGNOSIS — M54.5 CHRONIC LOW BACK PAIN, UNSPECIFIED BACK PAIN LATERALITY, WITH SCIATICA PRESENCE UNSPECIFIED: ICD-10-CM

## 2017-09-01 PROCEDURE — 87070 CULTURE OTHR SPECIMN AEROBIC: CPT | Performed by: INTERNAL MEDICINE

## 2017-09-01 PROCEDURE — 87186 SC STD MICRODIL/AGAR DIL: CPT | Performed by: INTERNAL MEDICINE

## 2017-09-01 PROCEDURE — 87077 CULTURE AEROBIC IDENTIFY: CPT | Performed by: INTERNAL MEDICINE

## 2017-09-01 RX ORDER — HYDROCODONE POLISTIREX AND CHLORPHENIRAMINE POLISTIREX 10; 8 MG/5ML; MG/5ML
5 SUSPENSION, EXTENDED RELEASE ORAL
Qty: 200 ML | Refills: 0 | Status: SHIPPED | OUTPATIENT
Start: 2017-09-01 | End: 2017-10-02 | Stop reason: SDUPTHER

## 2017-09-01 NOTE — MR AVS SNAPSHOT
Visit Information Date & Time Provider Department Dept. Phone Encounter #  
 9/1/2017 11:00 AM Vannessa Hargrove MD [facility] [phone] [id number] Follow-up Instructions Return in about 1 month (around 10/1/2017). Follow-up and Disposition History Upcoming Health Maintenance Date Due Hepatitis C Screening 1945 COLONOSCOPY 5/2/1963 DTaP/Tdap/Td series (1 - Tdap) 5/2/1966 ZOSTER VACCINE AGE 60> 3/2/2005 MEDICARE YEARLY EXAM 6/24/2016 INFLUENZA AGE 9 TO ADULT 8/1/2017 Pneumococcal 65+ Low/Medium Risk (2 of 2 - PPSV23) 10/31/2017 BREAST CANCER SCRN MAMMOGRAM 6/13/2019 GLAUCOMA SCREENING Q2Y 6/26/2019 Allergies as of 9/1/2017  Review Complete On: 9/1/2017 By: Vannessa Hargrove MD  
  
 Severity Noted Reaction Type Reactions Amoxicillin  08/23/2010    Unknown (comments) Avelox [Moxifloxacin]  08/23/2010    Unknown (comments) Celexa [Citalopram]  08/23/2010    Unknown (comments) Ciprofloxacin  08/23/2010    Unknown (comments) Doxycycline  01/17/2017    Nausea and Vomiting Levaquin [Levofloxacin]  08/23/2010    Unknown (comments) Paxil [Paroxetine Hcl]  08/23/2010    Unknown (comments) Sulfa (Sulfonamide Antibiotics)  08/23/2010    Unknown (comments) Joints ache Tequin [Gatifloxacin]  08/23/2010    Unknown (comments) Current Immunizations  Reviewed on 10/31/2016 Name Date Influenza High Dose Vaccine PF 10/31/2016  2:06 PM, 10/20/2015 Influenza Vaccine 10/22/2013 Influenza Vaccine (Quad) 11/24/2014 12:17 PM  
 Influenza Vaccine Split 11/16/2011 Not reviewed this visit You Were Diagnosed With   
  
 Codes Comments COPD exacerbation ([facility]    -  Primary ICD-10-CM: J44.1 ICD-9-CM: 491.21   
 MRSA infection     ICD-10-CM: A49.02 
ICD-9-CM: 041.12 Vitals BP Pulse Temp Resp Height(growth percentile) Weight(growth percentile) 131/60 (!) 104 97.3 °F (36.3 °C) 18 5' 4\" (1.626 m) 119 lb 3.2 oz (54.1 kg) SpO2 BMI OB Status Smoking Status 95% 20.46 kg/m2 Postmenopausal Never Smoker Vitals History BMI and BSA Data Body Mass Index Body Surface Area  
 20.46 kg/m 2 1.56 m 2 Preferred Pharmacy Pharmacy Name Phone [facility] [address], [address] [phone] Your Updated Medication List  
  
   
This list is accurate as of: 9/1/17 12:40 PM.  Always use your most recent med list.  
  
  
  
  
 acetylcysteine 100 mg/mL (10 %) nebulizer solution Commonly known as:  MUCOMYST Take 4 mL by inhalation two (2) times a day. albuterol-ipratropium 2.5 mg-0.5 mg/3 ml Nebu Commonly known as:  DUO-NEB  
3 mL by Nebulization route every six (6) hours as needed. Use 1 vial t.i.d. Via nebulizer prn  
  
 ammonium lactate 12 % lotion Commonly known as:  LAC-HYDRIN  
rub in to affected area well twice a day as needed  
  
 betamethasone dipropionate 0.05 % topical cream  
Commonly known as:  Elsie Ast Apply  to affected area two (2) times a day. budesonide 0.5 mg/2 mL Nbsp Commonly known as:  PULMICORT  
2 mL by Nebulization route two (2) times a day. CALTRATE 600+D PLUS MINERALS 600 mg calcium- 400 unit Tab Generic drug:  Calcium Carbonate-Vit D3-Min Take  by mouth daily. CENTRUM SILVER Tab tablet Generic drug:  multivitamins-minerals-lutein Take  by mouth daily. * chlorpheniramine-HYDROcodone 10-8 mg/5 mL suspension Commonly known as:  Mervat Terry Take 5 mL by mouth every twelve (12) hours as needed for Cough. Max Daily Amount: 10 mL. May fill on 08/05/17 * chlorpheniramine-HYDROcodone 10-8 mg/5 mL suspension Commonly known as:  Mervat Terry Take 5 mL by mouth every twelve (12) hours as needed for Cough. Max Daily Amount: 10 mL. clonazePAM 0.5 mg tablet Commonly known as:  Amadou Brant Take 1 Tab by mouth two (2) times a day. Max Daily Amount: 1 mg.  
  
 desloratadine 5 mg tablet Commonly known as:  CLARINEX Take 1 Tab by mouth daily. erythromycin 500 mg tablet Take 150 mg by mouth three (3) times daily. estrogen (conjugated)-medroxyPROGESTERone 0.625-2.5 mg per tablet Commonly known as:  Alin Guitar Take 1 Tab by mouth daily. guaiFENesin 1,200 mg Ta12 ER tablet Commonly known as:  Deni [employer] Deni Take 1 Tab by mouth two (2) times a day. HYDROmorphone 1 mg/mL Liqd oral solution Commonly known as:  DILAUDID  
4 ml 1 hour before surgery as directed  
  
 hydrOXYzine HCl 25 mg tablet Commonly known as:  ATARAX Take 1 Tab by mouth three (3) times daily as needed for Itching. levothyroxine 100 mcg tablet Commonly known as:  SYNTHROID Take 1 Tab by mouth Daily (before breakfast). LORazepam 1 mg tablet Commonly known as:  ATIVAN  
1 every 8 hours for breakthrough anxiety  
  
 melatonin 3 mg tablet Take 3 mg by mouth nightly. methylPREDNISolone 4 mg tablet Commonly known as:  Sherri Cr Take as directed  
  
 metoprolol tartrate 25 mg tablet Commonly known as:  LOPRESSOR  
TAKE 1 TABLET BY MOUTH TWICE DAILY  
  
 OTHER Test INR as directed I82.629  
  
 oxyCODONE IR 10 mg Tab immediate release tablet Commonly known as:  Eleazar Quinn Take 20 mg by mouth every four (4) hours as needed. Saliva Substitution Combo No.3 Spra 1-2 sprays 3 times daily as directed  
  
 vancomycin in 0.9% sodium Cl 750 mg/150 mL Pgbk  
by IntraVENous route. VITAMIN D3 1,000 unit Cap Generic drug:  cholecalciferol Take  by mouth two (2) times a day. warfarin 1 mg tablet Commonly known as:  COUMADIN Take 1 Tab by mouth daily. ZONALON, PRUDOXIN 5 % topical cream  
Commonly known as:  doxepin (ZONALON, PRUDOXIN) 5% cream  
Apply  to affected area three (3) times daily. * Notice: This list has 2 medication(s) that are the same as other medications prescribed for you. Read the directions carefully, and ask your doctor or other care provider to review them with you. Prescriptions Printed Refills  
 chlorpheniramine-HYDROcodone (TUSSIONEX) 10-8 mg/5 mL suspension 0 Sig: Take 5 mL by mouth every twelve (12) hours as needed for Cough. Max Daily Amount: 10 mL. Class: Print Route: Oral  
  
Follow-up Instructions Return in about 1 month (around 10/1/2017). To-Do List   
 09/01/2017 Microbiology:  CULTURE, RESPIRATORY/SPUTUM/BRONCH W GRAM STAIN Introducing [facility]! Dear Leana Peoples: Thank you for requesting a [employer] account. Our records indicate that you already have an active [employer] account. You can access your account anytime at [email or web address]. [employer] Did you know that you can access your hospital and ER discharge instructions at any time in [employer]? You can also review all of your test results from your hospital stay or ER visit. Additional Information If you have questions, please visit the Frequently Asked Questions section of the [employer] website at [email or web address] [email or web address]/. Remember, [employer] is NOT to be used for urgent needs. For medical emergencies, dial 911. Now available from your iPhone and Android! Please provide this summary of care documentation to your next provider. Your primary care clinician is listed as Erna Perdue. If you have any questions after today's visit, please call [phone].

## 2017-09-01 NOTE — PROGRESS NOTES
Sandhya Paulino is a 67 y.o. female here today for 2 week follow-up        1. Have you been to the ER, urgent care clinic since your last visit? Hospitalized since your last visit? No    2. Have you seen or consulted any other health care providers outside of the [facility] since your last visit? Include any pap smears or colon screening.  Yes Where: Pain Management

## 2017-09-01 NOTE — PROGRESS NOTES
HISTORY OF PRESENT ILLNESS  Kailey Martinez is a 67 y.o. female. HPI   copd improved with mucomyst  + MRSA  Review of Systems   Respiratory: Positive for cough, sputum production and wheezing. All other systems reviewed and are negative. Past Medical History:   Diagnosis Date    Bronchiolitis     Cough     GERD (gastroesophageal reflux disease)     went for surgery for it    Hypertension     JRA (juvenile rheumatoid arthritis) ([facility] [address]        Current Outpatient Prescriptions:     acetylcysteine (MUCOMYST) 100 mg/mL (10 %) nebulizer solution, Take 4 mL by inhalation two (2) times a day., Disp: 120 mL, Rfl: 0    ZONALON, PRUDOXIN (DOXEPIN, ZONALON, PRUDOXIN, 5% CREAM) 5 % topical cream, Apply  to affected area three (3) times daily. , Disp: 30 g, Rfl: 0    chlorpheniramine-HYDROcodone (TUSSIONEX) 10-8 mg/5 mL suspension, Take 5 mL by mouth every twelve (12) hours as needed for Cough. Max Daily Amount: 10 mL., Disp: 200 mL, Rfl: 0    metoprolol tartrate (LOPRESSOR) 25 mg tablet, TAKE 1 TABLET BY MOUTH TWICE DAILY, Disp: 180 Tab, Rfl: 0    albuterol-ipratropium (DUO-NEB) 2.5 mg-0.5 mg/3 ml nebu, 3 mL by Nebulization route every six (6) hours as needed. Use 1 vial t.i.d. Via nebulizer prn, Disp: 360 Nebule, Rfl: 1    desloratadine (CLARINEX) 5 mg tablet, Take 1 Tab by mouth daily. , Disp: 30 Tab, Rfl: 0    clonazePAM (KLONOPIN) 0.5 mg tablet, Take 1 Tab by mouth two (2) times a day. Max Daily Amount: 1 mg., Disp: 30 Tab, Rfl: 0    cholecalciferol (VITAMIN D3) 1,000 unit cap, Take  by mouth two (2) times a day., Disp: , Rfl:     hydrOXYzine (ATARAX) 25 mg tablet, Take 1 Tab by mouth three (3) times daily as needed for Itching., Disp: 60 Tab, Rfl: 3    ammonium lactate (LAC-HYDRIN) 12 % lotion, rub in to affected area well twice a day as needed, Disp: 400 mL, Rfl: 3    budesonide (PULMICORT) 0.5 mg/2 mL nbsp, 2 mL by Nebulization route two (2) times a day.  (Patient taking differently: 500 mcg by Nebulization route two (2) times daily as needed.), Disp: 60 Each, Rfl: 3    levothyroxine (SYNTHROID) 100 mcg tablet, Take 1 Tab by mouth Daily (before breakfast). , Disp: 90 Tab, Rfl: 3    betamethasone dipropionate (DIPROSONE) 0.05 % topical cream, Apply  to affected area two (2) times a day. (Patient taking differently: Apply  to affected area as needed.), Disp: 30 g, Rfl: 3    oxyCODONE IR (ROXICODONE) 10 mg tab immediate release tablet, Take 20 mg by mouth every four (4) hours as needed. , Disp: , Rfl:     methylPREDNISolone (MEDROL DOSEPACK) 4 mg tablet, Take as directed, Disp: 1 Dose Pack, Rfl: 0    chlorpheniramine-HYDROcodone (TUSSIONEX) 10-8 mg/5 mL suspension, Take 5 mL by mouth every twelve (12) hours as needed for Cough. Max Daily Amount: 10 mL. May fill on 08/05/17, Disp: 200 mL, Rfl: 0    VANCOMYCIN/0.9 % SOD CHLORIDE (VANCOMYCIN IN 0.9% SODIUM CL) 750 mg/150 mL pgbk, by IntraVENous route., Disp: , Rfl:     warfarin (COUMADIN) 1 mg tablet, Take 1 Tab by mouth daily. (Patient taking differently: Take 1 mg by mouth daily. Indications: patient taking 3 mg per day), Disp: 30 Tab, Rfl: 3    OTHER, Test INR as directed I82.629, Disp: 1 Each, Rfl: 0    HYDROmorphone (DILAUDID) 1 mg/mL liqd oral solution, 4 ml 1 hour before surgery as directed, Disp: 8 mL, Rfl: 0    Saliva Substitution Combo No.3 spra, 1-2 sprays 3 times daily as directed, Disp: 40 mL, Rfl: 3    guaiFENesin (MUCINEX) 1,200 mg Ta12 ER tablet, Take 1 Tab by mouth two (2) times a day., Disp: 20 Tab, Rfl: 0    erythromycin 500 mg tablet, Take 150 mg by mouth three (3) times daily. , Disp: , Rfl:     melatonin 3 mg tablet, Take 3 mg by mouth nightly., Disp: , Rfl:     LORazepam (ATIVAN) 1 mg tablet, 1 every 8 hours for breakthrough anxiety, Disp: 40 Tab, Rfl: 0    estrogen, conjugated,-medroxyPROGESTERone (PREMPRO) 0.625-2.5 mg per tablet, Take 1 Tab by mouth daily. , Disp: 90 Tab, Rfl: 1    Calcium Carbonate-Vit D3-Min (CALTRATE 600+D PLUS MINERALS) 600 mg calcium- 400 unit Tab, Take  by mouth daily. , Disp: , Rfl:     multivitamins-minerals-lutein (CENTRUM SILVER) Tab, Take  by mouth daily. , Disp: , Rfl:   Visit Vitals    /60    Pulse (!) 104    Temp 97.3 °F (36.3 °C)    Resp 18    Ht 5' 4\" (1.626 m)    Wt 119 lb 3.2 oz (54.1 kg)    SpO2 95%    BMI 20.46 kg/m2       Physical Exam   Constitutional: She appears well-developed and well-nourished. No distress. Pulmonary/Chest: Effort normal. No respiratory distress. She has wheezes. She has no rales. She exhibits no tenderness. Skin: She is not diaphoretic. Vitals reviewed.       ASSESSMENT and PLAN  copd exacerbation improved   Plan  reculture sputum  Refill tussionex  D/w dr Meera Mcmullen

## 2017-09-03 LAB
BACTERIA SPEC CULT: ABNORMAL
GRAM STN SPEC: ABNORMAL
SERVICE CMNT-IMP: ABNORMAL

## 2017-09-20 ENCOUNTER — TELEPHONE (OUTPATIENT)
Dept: FAMILY MEDICINE CLINIC | Age: 72
End: 2017-09-20

## 2017-09-20 NOTE — TELEPHONE ENCOUNTER
Pt called wondering if Dr. Rebekah Salvador ever talked with Amina Macias about taking the pt back after discharge.

## 2017-09-27 ENCOUNTER — TELEPHONE (OUTPATIENT)
Dept: FAMILY MEDICINE CLINIC | Age: 72
End: 2017-09-27

## 2017-09-27 NOTE — TELEPHONE ENCOUNTER
Dr. Glenis Farris (pain management) needs a referral from Dr. Celestino Doyle and would like to work with Dr. Celestino Doyle to wean pt off medication (oxycodone)    Dr. Larissa Tovar fax is [phone]. No toney yet, they are waiting for the referral.    Pt currently has a 60 day supply left.

## 2017-10-02 ENCOUNTER — OFFICE VISIT (OUTPATIENT)
Dept: FAMILY MEDICINE CLINIC | Age: 72
End: 2017-10-02

## 2017-10-02 VITALS
RESPIRATION RATE: 16 BRPM | DIASTOLIC BLOOD PRESSURE: 57 MMHG | BODY MASS INDEX: 21.48 KG/M2 | OXYGEN SATURATION: 92 % | SYSTOLIC BLOOD PRESSURE: 112 MMHG | WEIGHT: 125.8 LBS | HEIGHT: 64 IN | HEART RATE: 96 BPM | TEMPERATURE: 98.3 F

## 2017-10-02 DIAGNOSIS — J44.9 CHRONIC OBSTRUCTIVE PULMONARY DISEASE, UNSPECIFIED COPD TYPE (HCC): ICD-10-CM

## 2017-10-02 DIAGNOSIS — F11.29 OPIOID DEPENDENCE WITH OPIOID-INDUCED DISORDER (HCC): Primary | ICD-10-CM

## 2017-10-02 DIAGNOSIS — Z23 ENCOUNTER FOR IMMUNIZATION: ICD-10-CM

## 2017-10-02 DIAGNOSIS — M08.3 POLYARTICULAR JUVENILE RHEUMATOID ARTHRITIS, CHRONIC (HCC): Chronic | ICD-10-CM

## 2017-10-02 RX ORDER — CLONIDINE HYDROCHLORIDE 0.1 MG/1
0.1 TABLET ORAL 2 TIMES DAILY
Qty: 60 TAB | Refills: 2 | Status: SHIPPED | OUTPATIENT
Start: 2017-10-02 | End: 2017-11-10 | Stop reason: ALTCHOICE

## 2017-10-02 RX ORDER — HYDROCODONE POLISTIREX AND CHLORPHENIRAMINE POLISTIREX 10; 8 MG/5ML; MG/5ML
5 SUSPENSION, EXTENDED RELEASE ORAL
Qty: 200 ML | Refills: 0 | Status: SHIPPED | OUTPATIENT
Start: 2017-10-02 | End: 2017-11-03 | Stop reason: SDUPTHER

## 2017-10-02 RX ORDER — PROMETHAZINE HYDROCHLORIDE 25 MG/1
25 TABLET ORAL
Qty: 30 TAB | Refills: 0 | Status: SHIPPED | OUTPATIENT
Start: 2017-10-02 | End: 2017-11-10 | Stop reason: ALTCHOICE

## 2017-10-02 RX ORDER — HYDROXYZINE PAMOATE 25 MG/1
25 CAPSULE ORAL 3 TIMES DAILY
Qty: 30 CAP | Refills: 0 | Status: SHIPPED | OUTPATIENT
Start: 2017-10-02 | End: 2017-10-02 | Stop reason: CLARIF

## 2017-10-02 RX ORDER — LORAZEPAM 1 MG/1
1 TABLET ORAL
Qty: 30 TAB | Refills: 0 | Status: SHIPPED | OUTPATIENT
Start: 2017-10-02 | End: 2017-11-10 | Stop reason: ALTCHOICE

## 2017-10-02 RX ORDER — HYDROXYZINE PAMOATE 25 MG/1
25 CAPSULE ORAL 3 TIMES DAILY
Qty: 30 CAP | Refills: 0 | Status: SHIPPED | OUTPATIENT
Start: 2017-10-02 | End: 2017-10-16

## 2017-10-02 NOTE — PROGRESS NOTES
HISTORY OF PRESENT ILLNESS  Jessee Guy is a 67 y.o. female. HPI  Copd stable  Chronic pain for withdrawal protocol  Review of Systems   Respiratory: Positive for shortness of breath and wheezing. All other systems reviewed and are negative. Past Medical History:   Diagnosis Date    Bronchiolitis     Cough     GERD (gastroesophageal reflux disease)     went for surgery for it    Hypertension     JRA (juvenile rheumatoid arthritis) ([facility])        Current Outpatient Prescriptions:     hydrOXYzine pamoate (VISTARIL) 25 mg capsule, Take 1 Cap by mouth three (3) times daily for 14 days. , Disp: 30 Cap, Rfl: 0    promethazine (PHENERGAN) 25 mg tablet, Take 1 Tab by mouth every six (6) hours as needed for Nausea., Disp: 30 Tab, Rfl: 0    LORazepam (ATIVAN) 1 mg tablet, Take 1 Tab by mouth every four (4) hours as needed for Anxiety. Max Daily Amount: 6 mg., Disp: 30 Tab, Rfl: 0    cloNIDine HCl (CATAPRES) 0.1 mg tablet, Take 1 Tab by mouth two (2) times a day., Disp: 60 Tab, Rfl: 2    chlorpheniramine-HYDROcodone (TUSSIONEX) 10-8 mg/5 mL suspension, Take 5 mL by mouth every twelve (12) hours as needed for Cough. Max Daily Amount: 10 mL., Disp: 200 mL, Rfl: 0    acetylcysteine (MUCOMYST) 100 mg/mL (10 %) nebulizer solution, Take 4 mL by inhalation two (2) times a day., Disp: 120 mL, Rfl: 0    ZONALON, PRUDOXIN (DOXEPIN, ZONALON, PRUDOXIN, 5% CREAM) 5 % topical cream, Apply  to affected area three (3) times daily. , Disp: 30 g, Rfl: 0    chlorpheniramine-HYDROcodone (TUSSIONEX) 10-8 mg/5 mL suspension, Take 5 mL by mouth every twelve (12) hours as needed for Cough. Max Daily Amount: 10 mL.  May fill on 08/05/17, Disp: 200 mL, Rfl: 0    OTHER, Test INR as directed I82.629, Disp: 1 Each, Rfl: 0    metoprolol tartrate (LOPRESSOR) 25 mg tablet, TAKE 1 TABLET BY MOUTH TWICE DAILY, Disp: 180 Tab, Rfl: 0    albuterol-ipratropium (DUO-NEB) 2.5 mg-0.5 mg/3 ml nebu, 3 mL by Nebulization route every six (6) hours as needed. Use 1 vial t.i.d. Via nebulizer prn, Disp: 360 Nebule, Rfl: 1    clonazePAM (KLONOPIN) 0.5 mg tablet, Take 1 Tab by mouth two (2) times a day. Max Daily Amount: 1 mg., Disp: 30 Tab, Rfl: 0    cholecalciferol (VITAMIN D3) 1,000 unit cap, Take  by mouth two (2) times a day., Disp: , Rfl:     hydrOXYzine (ATARAX) 25 mg tablet, Take 1 Tab by mouth three (3) times daily as needed for Itching., Disp: 60 Tab, Rfl: 3    ammonium lactate (LAC-HYDRIN) 12 % lotion, rub in to affected area well twice a day as needed, Disp: 400 mL, Rfl: 3    budesonide (PULMICORT) 0.5 mg/2 mL nbsp, 2 mL by Nebulization route two (2) times a day. (Patient taking differently: 500 mcg by Nebulization route two (2) times daily as needed.), Disp: 60 Each, Rfl: 3    levothyroxine (SYNTHROID) 100 mcg tablet, Take 1 Tab by mouth Daily (before breakfast). , Disp: 90 Tab, Rfl: 3    betamethasone dipropionate (DIPROSONE) 0.05 % topical cream, Apply  to affected area two (2) times a day. (Patient taking differently: Apply  to affected area as needed.), Disp: 30 g, Rfl: 3    oxyCODONE IR (ROXICODONE) 10 mg tab immediate release tablet, Take 20 mg by mouth every four (4) hours as needed. , Disp: , Rfl:     Calcium Carbonate-Vit D3-Min (CALTRATE 600+D PLUS MINERALS) 600 mg calcium- 400 unit Tab, Take  by mouth daily. , Disp: , Rfl:     methylPREDNISolone (MEDROL DOSEPACK) 4 mg tablet, Take as directed, Disp: 1 Dose Pack, Rfl: 0    VANCOMYCIN/0.9 % SOD CHLORIDE (VANCOMYCIN IN 0.9% SODIUM CL) 750 mg/150 mL pgbk, by IntraVENous route., Disp: , Rfl:     warfarin (COUMADIN) 1 mg tablet, Take 1 Tab by mouth daily. (Patient taking differently: Take 1 mg by mouth daily.  Indications: patient taking 3 mg per day), Disp: 30 Tab, Rfl: 3    HYDROmorphone (DILAUDID) 1 mg/mL liqd oral solution, 4 ml 1 hour before surgery as directed, Disp: 8 mL, Rfl: 0    Saliva Substitution Combo No.3 spra, 1-2 sprays 3 times daily as directed, Disp: 40 mL, Rfl: 3   guaiFENesin (MUCINEX) 1,200 mg Ta12 ER tablet, Take 1 Tab by mouth two (2) times a day., Disp: 20 Tab, Rfl: 0    desloratadine (CLARINEX) 5 mg tablet, Take 1 Tab by mouth daily. , Disp: 30 Tab, Rfl: 0    erythromycin 500 mg tablet, Take 150 mg by mouth three (3) times daily. , Disp: , Rfl:     melatonin 3 mg tablet, Take 3 mg by mouth nightly., Disp: , Rfl:     LORazepam (ATIVAN) 1 mg tablet, 1 every 8 hours for breakthrough anxiety, Disp: 40 Tab, Rfl: 0    estrogen, conjugated,-medroxyPROGESTERone (PREMPRO) 0.625-2.5 mg per tablet, Take 1 Tab by mouth daily. , Disp: 90 Tab, Rfl: 1    multivitamins-minerals-lutein (CENTRUM SILVER) Tab, Take  by mouth daily. , Disp: , Rfl:   Visit Vitals    /57 (BP 1 Location: Right arm, BP Patient Position: Sitting)    Pulse 96    Temp 98.3 °F (36.8 °C) (Oral)    Resp 16    Ht 5' 4\" (1.626 m)    Wt 125 lb 12.8 oz (57.1 kg)    SpO2 92%    BMI 21.59 kg/m2         Physical Exam   Constitutional: She is oriented to person, place, and time. She appears well-developed and well-nourished. No distress. Neurological: She is alert and oriented to person, place, and time. She has normal reflexes. Skin: She is not diaphoretic. Psychiatric: She has a normal mood and affect. Her behavior is normal. Judgment and thought content normal.   Vitals reviewed.   d/w dr cox    ASSESSMENT and PLAN  chronic pain syndrome for withdrawal from opiates   Plan  rx done  Refill  Recheck in 1 month

## 2017-10-02 NOTE — MR AVS SNAPSHOT
Visit Information Date & Time Provider Department Dept. Phone Encounter #  
 10/2/2017 10:45 AM Jhon Lamas, 3 [facility] [phone] [id number] Follow-up Instructions Return in about 1 month (around 11/2/2017). Upcoming Health Maintenance Date Due Hepatitis C Screening 1945 COLONOSCOPY 5/2/1963 DTaP/Tdap/Td series (1 - Tdap) 5/2/1966 ZOSTER VACCINE AGE 60> 3/2/2005 MEDICARE YEARLY EXAM 6/24/2016 INFLUENZA AGE 9 TO ADULT 8/1/2017 Pneumococcal 65+ Low/Medium Risk (2 of 2 - PPSV23) 10/31/2017 BREAST CANCER SCRN MAMMOGRAM 6/13/2019 GLAUCOMA SCREENING Q2Y 6/26/2019 Allergies as of 10/2/2017  Review Complete On: 10/2/2017 By: Jhon Lamas MD  
  
 Severity Noted Reaction Type Reactions Amoxicillin  08/23/2010    Unknown (comments) Avelox [Moxifloxacin]  08/23/2010    Unknown (comments) Celexa [Citalopram]  08/23/2010    Unknown (comments) Ciprofloxacin  08/23/2010    Unknown (comments) Doxycycline  01/17/2017    Nausea and Vomiting Levaquin [Levofloxacin]  08/23/2010    Unknown (comments) Paxil [Paroxetine Hcl]  08/23/2010    Unknown (comments) Sulfa (Sulfonamide Antibiotics)  08/23/2010    Unknown (comments) Joints ache Tequin [Gatifloxacin]  08/23/2010    Unknown (comments) Current Immunizations  Reviewed on 10/31/2016 Name Date Influenza High Dose Vaccine PF  Incomplete, 10/31/2016  2:06 PM, 10/20/2015 Influenza Vaccine 10/22/2013 Influenza Vaccine (Quad) 11/24/2014 12:17 PM  
 Influenza Vaccine Split 11/16/2011 Not reviewed this visit You Were Diagnosed With   
  
 Codes Comments Opioid dependence with opioid-induced disorder ([facility] [address]    -  Primary ICD-10-CM: F11.29 ICD-9-CM: 292.9, 304.00 Polyarticular juvenile rheumatoid arthritis, chronic (HCC)     ICD-10-CM: M08.3 ICD-9-CM: 714.30  Chronic obstructive pulmonary disease, unspecified COPD type ([facility] [address] ICD-10-CM: J44.9 ICD-9-CM: 948 Encounter for immunization     ICD-10-CM: O22 ICD-9-CM: V03.89 Vitals BP Pulse Temp Resp Height(growth percentile) Weight(growth percentile) 112/57 (BP 1 Location: Right arm, BP Patient Position: Sitting) 96 98.3 °F (36.8 °C) (Oral) 16 5' 4\" (1.626 m) 125 lb 12.8 oz (57.1 kg) SpO2 BMI OB Status Smoking Status 92% 21.59 kg/m2 Postmenopausal Never Smoker BMI and BSA Data Body Mass Index Body Surface Area  
 21.59 kg/m 2 1.61 m 2 Preferred Pharmacy Pharmacy Name Phone [phone] [facility] [address] [phone] Your Updated Medication List  
  
   
This list is accurate as of: 10/2/17 11:22 AM.  Always use your most recent med list.  
  
  
  
  
 acetylcysteine 100 mg/mL (10 %) nebulizer solution Commonly known as:  MUCOMYST Take 4 mL by inhalation two (2) times a day. albuterol-ipratropium 2.5 mg-0.5 mg/3 ml Nebu Commonly known as:  DUO-NEB  
3 mL by Nebulization route every six (6) hours as needed. Use 1 vial t.i.d. Via nebulizer prn  
  
 ammonium lactate 12 % lotion Commonly known as:  LAC-HYDRIN  
rub in to affected area well twice a day as needed  
  
 betamethasone dipropionate 0.05 % topical cream  
Commonly known as:  Nadege Shanice Apply  to affected area two (2) times a day. budesonide 0.5 mg/2 mL Nbsp Commonly known as:  PULMICORT  
2 mL by Nebulization route two (2) times a day. CALTRATE 600+D PLUS MINERALS 600 mg calcium- 400 unit Tab Generic drug:  Calcium Carbonate-Vit D3-Min Take  by mouth daily. CENTRUM SILVER Tab tablet Generic drug:  multivitamins-minerals-lutein Take  by mouth daily. * chlorpheniramine-HYDROcodone 10-8 mg/5 mL suspension Commonly known as:  Yesenia Millsroy Take 5 mL by mouth every twelve (12) hours as needed for Cough. Max Daily Amount: 10 mL. May fill on 08/05/17 * chlorpheniramine-HYDROcodone 10-8 mg/5 mL suspension Commonly known as:  Natali Burnham Take 5 mL by mouth every twelve (12) hours as needed for Cough. Max Daily Amount: 10 mL. clonazePAM 0.5 mg tablet Commonly known as:  Dellaa Listen Take 1 Tab by mouth two (2) times a day. Max Daily Amount: 1 mg.  
  
 cloNIDine HCl 0.1 mg tablet Commonly known as:  CATAPRES Take 1 Tab by mouth two (2) times a day. desloratadine 5 mg tablet Commonly known as:  CLARINEX Take 1 Tab by mouth daily. erythromycin 500 mg tablet Take 150 mg by mouth three (3) times daily. estrogen (conjugated)-medroxyPROGESTERone 0.625-2.5 mg per tablet Commonly known as:  Serjio Champ Take 1 Tab by mouth daily. guaiFENesin 1,200 mg Ta12 ER tablet Commonly known as:  [place] Take 1 Tab by mouth two (2) times a day. HYDROmorphone 1 mg/mL Liqd oral solution Commonly known as:  DILAUDID  
4 ml 1 hour before surgery as directed  
  
 hydrOXYzine HCl 25 mg tablet Commonly known as:  ATARAX Take 1 Tab by mouth three (3) times daily as needed for Itching.  
  
 hydrOXYzine pamoate 25 mg capsule Commonly known as:  VISTARIL Take 1 Cap by mouth three (3) times daily for 14 days. levothyroxine 100 mcg tablet Commonly known as:  SYNTHROID Take 1 Tab by mouth Daily (before breakfast). * LORazepam 1 mg tablet Commonly known as:  ATIVAN  
1 every 8 hours for breakthrough anxiety * LORazepam 1 mg tablet Commonly known as:  ATIVAN Take 1 Tab by mouth every four (4) hours as needed for Anxiety. Max Daily Amount: 6 mg.  
  
 melatonin 3 mg tablet Take 3 mg by mouth nightly. methylPREDNISolone 4 mg tablet Commonly known as:  Karle Rosana Take as directed  
  
 metoprolol tartrate 25 mg tablet Commonly known as:  LOPRESSOR  
TAKE 1 TABLET BY MOUTH TWICE DAILY  
  
 OTHER Test INR as directed I82.629  
  
 oxyCODONE IR 10 mg Tab immediate release tablet Commonly known as:  [place] Gun Take 20 mg by mouth every four (4) hours as needed. promethazine 25 mg tablet Commonly known as:  PHENERGAN Take 1 Tab by mouth every six (6) hours as needed for Nausea. Saliva Substitution Combo No.3 Spra 1-2 sprays 3 times daily as directed  
  
 vancomycin in 0.9% sodium Cl 750 mg/150 mL Pgbk  
by IntraVENous route. VITAMIN D3 1,000 unit Cap Generic drug:  cholecalciferol Take  by mouth two (2) times a day. warfarin 1 mg tablet Commonly known as:  COUMADIN Take 1 Tab by mouth daily. ZONALON, PRUDOXIN 5 % topical cream  
Commonly known as:  doxepin (ZONALON, PRUDOXIN) 5% cream  
Apply  to affected area three (3) times daily. * Notice: This list has 4 medication(s) that are the same as other medications prescribed for you. Read the directions carefully, and ask your doctor or other care provider to review them with you. Prescriptions Printed Refills  
 promethazine (PHENERGAN) 25 mg tablet 0 Sig: Take 1 Tab by mouth every six (6) hours as needed for Nausea. Class: Print Route: Oral  
 LORazepam (ATIVAN) 1 mg tablet 0 Sig: Take 1 Tab by mouth every four (4) hours as needed for Anxiety. Max Daily Amount: 6 mg. Class: Print Route: Oral  
 cloNIDine HCl (CATAPRES) 0.1 mg tablet 2 Sig: Take 1 Tab by mouth two (2) times a day. Class: Print Route: Oral  
 chlorpheniramine-HYDROcodone (TUSSIONEX) 10-8 mg/5 mL suspension 0 Sig: Take 5 mL by mouth every twelve (12) hours as needed for Cough. Max Daily Amount: 10 mL. Class: Print Route: Oral  
  
Prescriptions Sent to Pharmacy Refills  
 hydrOXYzine pamoate (VISTARIL) 25 mg capsule 0 Sig: Take 1 Cap by mouth three (3) times daily for 14 days. Class: Normal  
 Pharmacy: [facility] [address] [place] [address] Rd, [address] #: [phone]  Route: Oral  
  
 We Performed the Following INFLUENZA VIRUS VACCINE, HIGH DOSE SEASONAL, PRESERVATIVE FREE [90952 CPT(R)] REFERRAL TO PAIN MANAGEMENT [FQE053 Custom] Follow-up Instructions Return in about 1 month (around 11/2/2017). Referral Information Referral ID Referred By Referred To  
  
 [id number] [facility] [address] [facility] [address] [place], [address] [place] [address] Phone: [phone] Fax: [phone] Visits Status Start Date End Date 1 New Request 10/2/17 10/2/18 If your referral has a status of pending review or denied, additional information will be sent to support the outcome of this decision. Introducing [facility]! Dear Singh Varma: Thank you for requesting a [employer] account. Our records indicate that you already have an active Tro[employer] account. You can access your account anytime at [email or web address] Did you know that you can access your hospital and ER discharge instructions at any time in Trovit? You can also review all of your test results from your hospital stay or ER visit. Additional Information If you have questions, please visit the Frequently Asked Questions section of the Trovit website at [email or web address] Remember, Trovit is NOT to be used for urgent needs. For medical emergencies, dial 911. Now available from your iPhone and Android! Please provide this summary of care documentation to your next provider. Your primary care clinician is listed as Timothy Bergenfield. If you have any questions after today's visit, please call [phone].

## 2017-10-02 NOTE — PROGRESS NOTES
Mercy Esposito is a 67 y.o. female (: 1945) presenting to address:COPD 1 month f/u    Chief Complaint   Patient presents with    COPD     1 month f/u       Vitals:    10/02/17 1054   BP: 112/57   Pulse: 96   Resp: 16   Temp: 98.3 °F (36.8 °C)   TempSrc: Oral   SpO2: 92%   Weight: 125 lb 12.8 oz (57.1 kg)   Height: 5' 4\" (1.626 m)   PainSc:   0 - No pain       Hearing/Vision:   No exam data present    Learning Assessment:     Learning Assessment 2014   PRIMARY LEARNER Patient   HIGHEST LEVEL OF EDUCATION - PRIMARY LEARNER  GRADUATED HIGH SCHOOL OR GED   BARRIERS PRIMARY LEARNER NONE   CO-LEARNER CAREGIVER No   PRIMARY LANGUAGE ENGLISH   LEARNER PREFERENCE PRIMARY OTHER (COMMENT)   ANSWERED BY Patient   RELATIONSHIP SELF     Depression Screening:     PHQ over the last two weeks 3/4/2014   Little interest or pleasure in doing things Nearly every day   Feeling down, depressed or hopeless Nearly every day   Total Score PHQ 2 6   Trouble falling or staying asleep, or sleeping too much Nearly every day   Feeling tired or having little energy Not at all   Poor appetite or overeating Several days   Feeling bad about yourself - or that you are a failure or have let yourself or your family down Nearly every day   Trouble concentrating on things such as school, work, reading or watching TV Nearly every day   Moving or speaking so slowly that other people could have noticed; or the opposite being so fidgety that others notice Nearly every day   Thoughts of being better off dead, or hurting yourself in some way Nearly every day   How difficult have these problems made it for you to do your work, take care of your home and get along with others Extremely difficult     Fall Risk Assessment:     Fall Risk Assessment, last 12 mths 2017   Able to walk? Yes   Fall in past 12 months? Yes   Fall with injury?  Yes   Number of falls in past 12 months 2   Fall Risk Score 3     Abuse Screening:   No flowsheet data found. Coordination of Care Questionaire:   1. Have you been to the ER, urgent care clinic since your last visit? Hospitalized since your last visit? no    2. Have you seen or consulted any other health care providers outside of the [address] [place] [address] since your last visit? Include any pap smears or colon screening. Psych, Dermatology     Advanced Directive:   1. Do you have an Advanced Directive? no    2. Would you like information on Advanced Directives? No    Patient would like to receive flu vaccine today.

## 2017-10-16 ENCOUNTER — TELEPHONE (OUTPATIENT)
Dept: FAMILY MEDICINE CLINIC | Age: 72
End: 2017-10-16

## 2017-10-16 NOTE — TELEPHONE ENCOUNTER
Pt called and would like to speak to a nurse or Dr Celestino Doyle in regards to some problems she is having with some medication and also in regards to a Dr Glenis Farris. Please return her call at earliest convenience.

## 2017-10-16 NOTE — TELEPHONE ENCOUNTER
Patient is calling requesting to have a referral for Pain Management to be sent to [facility] (fax) [phone]. Patient would also like to have phone call once this has been completed.

## 2017-11-03 ENCOUNTER — HOSPITAL ENCOUNTER (OUTPATIENT)
Dept: LAB | Age: 72
Discharge: HOME | End: 2017-11-03
Payer: MEDICARE

## 2017-11-03 ENCOUNTER — OFFICE VISIT (OUTPATIENT)
Dept: FAMILY MEDICINE CLINIC | Age: 72
End: 2017-11-03

## 2017-11-03 VITALS
RESPIRATION RATE: 16 BRPM | SYSTOLIC BLOOD PRESSURE: 87 MMHG | TEMPERATURE: 99 F | DIASTOLIC BLOOD PRESSURE: 42 MMHG | OXYGEN SATURATION: 96 % | HEART RATE: 97 BPM

## 2017-11-03 DIAGNOSIS — F11.90 OPIATE USE: ICD-10-CM

## 2017-11-03 DIAGNOSIS — J44.1 COPD WITH EXACERBATION (HCC): Primary | ICD-10-CM

## 2017-11-03 PROCEDURE — 87077 CULTURE AEROBIC IDENTIFY: CPT | Performed by: INTERNAL MEDICINE

## 2017-11-03 PROCEDURE — 87070 CULTURE OTHR SPECIMN AEROBIC: CPT | Performed by: INTERNAL MEDICINE

## 2017-11-03 RX ORDER — CEFDINIR 300 MG/1
300 CAPSULE ORAL 2 TIMES DAILY
Qty: 20 CAP | Refills: 0 | Status: SHIPPED | OUTPATIENT
Start: 2017-11-03 | End: 2017-11-13

## 2017-11-03 RX ORDER — HYDROCODONE POLISTIREX AND CHLORPHENIRAMINE POLISTIREX 10; 8 MG/5ML; MG/5ML
5 SUSPENSION, EXTENDED RELEASE ORAL
Qty: 200 ML | Refills: 0 | Status: SHIPPED | OUTPATIENT
Start: 2017-11-03 | End: 2017-12-01 | Stop reason: SDUPTHER

## 2017-11-03 RX ORDER — CEFTRIAXONE 500 MG/1
500 INJECTION, POWDER, FOR SOLUTION INTRAMUSCULAR; INTRAVENOUS ONCE
Qty: 500 MG | Refills: 0
Start: 2017-11-03 | End: 2017-11-03

## 2017-11-03 RX ORDER — DEXAMETHASONE SODIUM PHOSPHATE 4 MG/ML
4 INJECTION, SOLUTION INTRA-ARTICULAR; INTRALESIONAL; INTRAMUSCULAR; INTRAVENOUS; SOFT TISSUE ONCE
Qty: 1 VIAL | Refills: 0
Start: 2017-11-03 | End: 2017-11-03

## 2017-11-03 RX ORDER — METHYLPREDNISOLONE 4 MG/1
TABLET ORAL
Qty: 1 DOSE PACK | Refills: 0 | Status: SHIPPED | OUTPATIENT
Start: 2017-11-03 | End: 2018-01-09 | Stop reason: ALTCHOICE

## 2017-11-03 RX ORDER — LEVALBUTEROL INHALATION SOLUTION 1.25 MG/3ML
1.25 SOLUTION RESPIRATORY (INHALATION)
Qty: 3 ML | Refills: 0
Start: 2017-11-03 | End: 2017-11-03

## 2017-11-03 NOTE — PROGRESS NOTES
HISTORY OF PRESENT ILLNESS  Suha Stewart is a 67 y.o. female. HPI  Exacerbation of copd  A/w cough, sputum  Awaiting opiate withdrawal treatment  Review of Systems   Respiratory: Positive for cough, shortness of breath and wheezing. Psychiatric/Behavioral: Positive for depression. All other systems reviewed and are negative. Past Medical History:   Diagnosis Date    Bronchiolitis     Cough     GERD (gastroesophageal reflux disease)     went for surgery for it    Hypertension     JRA (juvenile rheumatoid arthritis) ([facility] [address]      Current Outpatient Prescriptions on File Prior to Visit   Medication Sig Dispense Refill    promethazine (PHENERGAN) 25 mg tablet Take 1 Tab by mouth every six (6) hours as needed for Nausea. 30 Tab 0    LORazepam (ATIVAN) 1 mg tablet Take 1 Tab by mouth every four (4) hours as needed for Anxiety. Max Daily Amount: 6 mg. 30 Tab 0    cloNIDine HCl (CATAPRES) 0.1 mg tablet Take 1 Tab by mouth two (2) times a day. 60 Tab 2    chlorpheniramine-HYDROcodone (TUSSIONEX) 10-8 mg/5 mL suspension Take 5 mL by mouth every twelve (12) hours as needed for Cough. Max Daily Amount: 10 mL. 200 mL 0    acetylcysteine (MUCOMYST) 100 mg/mL (10 %) nebulizer solution Take 4 mL by inhalation two (2) times a day. 120 mL 0    ZONALON, PRUDOXIN (DOXEPIN, ZONALON, PRUDOXIN, 5% CREAM) 5 % topical cream Apply  to affected area three (3) times daily. 30 g 0    methylPREDNISolone (MEDROL DOSEPACK) 4 mg tablet Take as directed 1 Dose Pack 0    chlorpheniramine-HYDROcodone (TUSSIONEX) 10-8 mg/5 mL suspension Take 5 mL by mouth every twelve (12) hours as needed for Cough. Max Daily Amount: 10 mL. May fill on 08/05/17 200 mL 0    VANCOMYCIN/0.9 % SOD CHLORIDE (VANCOMYCIN IN 0.9% SODIUM CL) 750 mg/150 mL pgbk by IntraVENous route.  warfarin (COUMADIN) 1 mg tablet Take 1 Tab by mouth daily. (Patient taking differently: Take 1 mg by mouth daily.  Indications: patient taking 3 mg per day) 30 Tab 3    OTHER Test INR as directed  I82.629 1 Each 0    metoprolol tartrate (LOPRESSOR) 25 mg tablet TAKE 1 TABLET BY MOUTH TWICE DAILY 180 Tab 0    HYDROmorphone (DILAUDID) 1 mg/mL liqd oral solution 4 ml 1 hour before surgery as directed 8 mL 0    Saliva Substitution Combo No.3 spra 1-2 sprays 3 times daily as directed 40 mL 3    albuterol-ipratropium (DUO-NEB) 2.5 mg-0.5 mg/3 ml nebu 3 mL by Nebulization route every six (6) hours as needed. Use 1 vial t.i.d. Via nebulizer prn 360 Nebule 1    guaiFENesin (MUCINEX) 1,200 mg Ta12 ER tablet Take 1 Tab by mouth two (2) times a day. 20 Tab 0    desloratadine (CLARINEX) 5 mg tablet Take 1 Tab by mouth daily. 30 Tab 0    clonazePAM (KLONOPIN) 0.5 mg tablet Take 1 Tab by mouth two (2) times a day. Max Daily Amount: 1 mg. 30 Tab 0    cholecalciferol (VITAMIN D3) 1,000 unit cap Take  by mouth two (2) times a day.  erythromycin 500 mg tablet Take 150 mg by mouth three (3) times daily.  melatonin 3 mg tablet Take 3 mg by mouth nightly.  LORazepam (ATIVAN) 1 mg tablet 1 every 8 hours for breakthrough anxiety 40 Tab 0    hydrOXYzine (ATARAX) 25 mg tablet Take 1 Tab by mouth three (3) times daily as needed for Itching. 60 Tab 3    ammonium lactate (LAC-HYDRIN) 12 % lotion rub in to affected area well twice a day as needed 400 mL 3    budesonide (PULMICORT) 0.5 mg/2 mL nbsp 2 mL by Nebulization route two (2) times a day. (Patient taking differently: 500 mcg by Nebulization route two (2) times daily as needed.) 60 Each 3    levothyroxine (SYNTHROID) 100 mcg tablet Take 1 Tab by mouth Daily (before breakfast). 90 Tab 3    betamethasone dipropionate (DIPROSONE) 0.05 % topical cream Apply  to affected area two (2) times a day. (Patient taking differently: Apply  to affected area as needed.) 30 g 3    estrogen, conjugated,-medroxyPROGESTERone (PREMPRO) 0.625-2.5 mg per tablet Take 1 Tab by mouth daily.  90 Tab 1    oxyCODONE IR (ROXICODONE) 10 mg tab immediate release tablet Take 20 mg by mouth every four (4) hours as needed.  Calcium Carbonate-Vit D3-Min (CALTRATE 600+D PLUS MINERALS) 600 mg calcium- 400 unit Tab Take  by mouth daily.  multivitamins-minerals-lutein (CENTRUM SILVER) Tab Take  by mouth daily. No current facility-administered medications on file prior to visit. Visit Vitals    BP (!) 87/42 (BP 1 Location: Right arm, BP Patient Position: Sitting)    Pulse 97    Temp 99 °F (37.2 °C) (Oral)    Resp 16    SpO2 96%         Physical Exam   Constitutional: She appears well-developed and well-nourished. No distress. Pulmonary/Chest: Effort normal. No respiratory distress. She has wheezes. She has rales. She exhibits no tenderness. Diminished breath sounds   Skin: She is not diaphoretic. Vitals reviewed.   d/w psych - defer withdrawal protocol to pain mgmt  Post HHN, some improved aeration, no obvious consolidation    ASSESSMENT and PLAN  exacerbation of copd   Opiate use  Plan  xopenex  Sputum cx  Rocephin > cefdinir  Decadron > medrol  Increase hhn to tid  Recheck in 1 week

## 2017-11-03 NOTE — MR AVS SNAPSHOT
Visit Information Date & Time Provider Department Dept. Phone Encounter #  
 11/3/2017 10:45 AM Mariajose Blair, Natty Vazquez [phone] [id number] Follow-up Instructions Return in about 1 week (around 11/10/2017). Upcoming Health Maintenance Date Due Hepatitis C Screening 1945 COLONOSCOPY 5/2/1963 DTaP/Tdap/Td series (1 - Tdap) 5/2/1966 ZOSTER VACCINE AGE 60> 3/2/2005 MEDICARE YEARLY EXAM 6/24/2016 BREAST CANCER SCRN MAMMOGRAM 6/13/2019 GLAUCOMA SCREENING Q2Y 6/26/2019 Allergies as of 11/3/2017  Review Complete On: 11/3/2017 By: Mariajose Blair MD  
  
 Severity Noted Reaction Type Reactions Amoxicillin  08/23/2010    Unknown (comments) Avelox [Moxifloxacin]  08/23/2010    Unknown (comments) Celexa [Citalopram]  08/23/2010    Unknown (comments) Ciprofloxacin  08/23/2010    Unknown (comments) Doxycycline  01/17/2017    Nausea and Vomiting Levaquin [Levofloxacin]  08/23/2010    Unknown (comments) Paxil [Paroxetine Hcl]  08/23/2010    Unknown (comments) Sulfa (Sulfonamide Antibiotics)  08/23/2010    Unknown (comments) Joints ache Tequin [Gatifloxacin]  08/23/2010    Unknown (comments) Current Immunizations  Reviewed on 10/2/2017 Name Date Influenza High Dose Vaccine PF 10/2/2017 11:24 AM, 10/31/2016  2:06 PM, 10/20/2015 Influenza Vaccine 10/22/2013 Influenza Vaccine (Quad) 11/24/2014 12:17 PM  
 Influenza Vaccine Split 11/16/2011 Not reviewed this visit You Were Diagnosed With   
  
 Codes Comments COPD with exacerbation ([facility] [address]    -  Primary ICD-10-CM: J44.1 ICD-9-CM: 491.21 Opiate use     ICD-10-CM: F11.90 ICD-9-CM: 305.50 Vitals BP Pulse Temp Resp SpO2 OB Status (!) 87/42 (BP 1 Location: Right arm, BP Patient Position: Sitting) 97 99 °F (37.2 °C) (Oral) 16 96% Postmenopausal  
 Smoking Status Never Smoker Vitals History Preferred Pharmacy Pharmacy Name Phone [facility] [address], [address] [phone] Your Updated Medication List  
  
   
This list is accurate as of: 11/3/17 11:46 AM.  Always use your most recent med list.  
  
  
  
  
 acetylcysteine 100 mg/mL (10 %) nebulizer solution Commonly known as:  MUCOMYST Take 4 mL by inhalation two (2) times a day. albuterol-ipratropium 2.5 mg-0.5 mg/3 ml Nebu Commonly known as:  DUO-NEB  
3 mL by Nebulization route every six (6) hours as needed. Use 1 vial t.i.d. Via nebulizer prn  
  
 ammonium lactate 12 % lotion Commonly known as:  LAC-HYDRIN  
rub in to affected area well twice a day as needed  
  
 betamethasone dipropionate 0.05 % topical cream  
Commonly known as:  Anai Roberta Apply  to affected area two (2) times a day. budesonide 0.5 mg/2 mL Nbsp Commonly known as:  PULMICORT  
2 mL by Nebulization route two (2) times a day. CALTRATE 600+D PLUS MINERALS 600 mg calcium- 400 unit Tab Generic drug:  Calcium Carbonate-Vit D3-Min Take  by mouth daily. cefdinir 300 mg capsule Commonly known as:  OMNICEF Take 1 Cap by mouth two (2) times a day for 10 days. Start on Saturday  
  
 cefTRIAXone 500 mg injection Commonly known as:  ROCEPHIN  
500 mg by IntraMUSCular route once for 1 dose. CENTRUM SILVER Tab tablet Generic drug:  multivitamins-minerals-lutein Take  by mouth daily. * chlorpheniramine-HYDROcodone 10-8 mg/5 mL suspension Commonly known as:  Rick Sor Take 5 mL by mouth every twelve (12) hours as needed for Cough. Max Daily Amount: 10 mL. May fill on 08/05/17 * chlorpheniramine-HYDROcodone 10-8 mg/5 mL suspension Commonly known as:  Rick Sor Take 5 mL by mouth every twelve (12) hours as needed for Cough. Max Daily Amount: 10 mL. clonazePAM 0.5 mg tablet Commonly known as:  Diamante Flores  
 Take 1 Tab by mouth two (2) times a day. Max Daily Amount: 1 mg.  
  
 cloNIDine HCl 0.1 mg tablet Commonly known as:  CATAPRES Take 1 Tab by mouth two (2) times a day. desloratadine 5 mg tablet Commonly known as:  CLARINEX Take 1 Tab by mouth daily. dexamethasone 4 mg/mL injection Commonly known as:  DECADRON  
1 mL by IntraMUSCular route once for 1 dose. erythromycin 500 mg tablet Take 150 mg by mouth three (3) times daily. estrogen (conjugated)-medroxyPROGESTERone 0.625-2.5 mg per tablet Commonly known as:  Soledad Coats Take 1 Tab by mouth daily. guaiFENesin 1,200 mg Ta12 ER tablet Commonly known as:  Dillon Josh Take 1 Tab by mouth two (2) times a day. HYDROmorphone 1 mg/mL Liqd oral solution Commonly known as:  DILAUDID  
4 ml 1 hour before surgery as directed  
  
 hydrOXYzine HCl 25 mg tablet Commonly known as:  ATARAX Take 1 Tab by mouth three (3) times daily as needed for Itching. levalbuterol 1.25 mg/3 mL Nebu Commonly known as:  XOPENEX  
3 mL by Nebulization route now for 1 dose. levothyroxine 100 mcg tablet Commonly known as:  SYNTHROID Take 1 Tab by mouth Daily (before breakfast). * LORazepam 1 mg tablet Commonly known as:  ATIVAN  
1 every 8 hours for breakthrough anxiety * LORazepam 1 mg tablet Commonly known as:  ATIVAN Take 1 Tab by mouth every four (4) hours as needed for Anxiety. Max Daily Amount: 6 mg.  
  
 melatonin 3 mg tablet Take 3 mg by mouth nightly. * methylPREDNISolone 4 mg tablet Commonly known as:  Elverna Ballantine Take as directed * methylPREDNISolone 4 mg tablet Commonly known as:  Elverna Gerhard Start on Saturday  
  
 metoprolol tartrate 25 mg tablet Commonly known as:  LOPRESSOR  
TAKE 1 TABLET BY MOUTH TWICE DAILY  
  
 OTHER Test INR as directed I82.629  
  
 oxyCODONE IR 10 mg Tab immediate release tablet Commonly known as:  Shiva Godoy  
 Take 20 mg by mouth every four (4) hours as needed. promethazine 25 mg tablet Commonly known as:  PHENERGAN Take 1 Tab by mouth every six (6) hours as needed for Nausea. Saliva Substitution Combo No.3 Spra 1-2 sprays 3 times daily as directed  
  
 vancomycin in 0.9% sodium Cl 750 mg/150 mL Pgbk  
by IntraVENous route. VITAMIN D3 1,000 unit Cap Generic drug:  cholecalciferol Take  by mouth two (2) times a day. warfarin 1 mg tablet Commonly known as:  COUMADIN Take 1 Tab by mouth daily. ZONALON, PRUDOXIN 5 % topical cream  
Commonly known as:  doxepin (ZONALON, PRUDOXIN) 5% cream  
Apply  to affected area three (3) times daily. * Notice: This list has 6 medication(s) that are the same as other medications prescribed for you. Read the directions carefully, and ask your doctor or other care provider to review them with you. Prescriptions Sent to Pharmacy Refills  
 cefdinir (OMNICEF) 300 mg capsule 0 Sig: Take 1 Cap by mouth two (2) times a day for 10 days. Start on Saturday Class: Normal  
 Pharmacy: [facility] [address], [address] [place] [address] Ph #: [phone] Route: Oral  
 methylPREDNISolone (MEDROL DOSEPACK) 4 mg tablet 0 Sig: Start on Saturday Class: Normal  
 Pharmacy: [facility] [address], [address] [place] [address] Ph #: [phone] We Performed the Following CEFTRIAXONE SODIUM INJECTION  MG [ [facility]] Comments:  
 Mix per protocol DEXAMETHASONE SODIUM PHOSPHATE INJECTION 1 MG [ [facility]] INHAL RX, AIRWAY OBST/DX SPUTUM INDUCT [id number] CPT(R)] LEVALBUTEROL, INHAL. SOL., FDA-APPROVED FINAL, NON-COMPOUND UNIT DOSE, 0.5 MG [ [facility]] MI INHAL RX, AIRWAY OBST/DX SPUTUM INDUCT [id number] CPT(R)] MI THER/PROPH/DIAG INJECTION, SUBCUT/IM [id number] CPT(R)] MA THER/PROPH/DIAG INJECTION, SUBCUT/IM [id number] CPT(R)] Follow-up Instructions Return in about 1 week (around 11/10/2017). Introducing [facility] SERVICES! Dear Lane Dean: Thank you for requesting a [employer] account. Our records indicate that you already have an active [employer] account. You can access your account anytime at [email or web address] Did you know that you can access your hospital and ER discharge instructions at any time in [employer]? You can also review all of your test results from your hospital stay or ER visit. Additional Information If you have questions, please visit the Frequently Asked Questions section of the [employer] website at [email or web address] Remember, [employer] is NOT to be used for urgent needs. For medical emergencies, dial 911. Now available from your iPhone and Android! Please provide this summary of care documentation to your next provider. Your primary care clinician is listed as Charlie Curtis. If you have any questions after today's visit, please call [phone].

## 2017-11-08 LAB
BACTERIA SPEC CULT: ABNORMAL
GRAM STN SPEC: ABNORMAL
SERVICE CMNT-IMP: ABNORMAL

## 2017-11-10 ENCOUNTER — OFFICE VISIT (OUTPATIENT)
Dept: FAMILY MEDICINE CLINIC | Age: 72
End: 2017-11-10

## 2017-11-10 VITALS
BODY MASS INDEX: 21.31 KG/M2 | SYSTOLIC BLOOD PRESSURE: 133 MMHG | RESPIRATION RATE: 18 BRPM | WEIGHT: 124.8 LBS | TEMPERATURE: 97.3 F | HEART RATE: 98 BPM | DIASTOLIC BLOOD PRESSURE: 73 MMHG | HEIGHT: 64 IN | OXYGEN SATURATION: 93 %

## 2017-11-10 DIAGNOSIS — F41.1 ANXIETY STATE: Chronic | ICD-10-CM

## 2017-11-10 DIAGNOSIS — E03.9 HYPOTHYROIDISM, UNSPECIFIED TYPE: Chronic | ICD-10-CM

## 2017-11-10 DIAGNOSIS — K21.9 GASTROESOPHAGEAL REFLUX DISEASE WITHOUT ESOPHAGITIS: Chronic | ICD-10-CM

## 2017-11-10 DIAGNOSIS — M08.3 POLYARTICULAR JUVENILE RHEUMATOID ARTHRITIS, CHRONIC (HCC): Chronic | ICD-10-CM

## 2017-11-10 DIAGNOSIS — J45.40 MODERATE PERSISTENT ASTHMA WITHOUT COMPLICATION: Primary | Chronic | ICD-10-CM

## 2017-11-10 NOTE — PROGRESS NOTES
Julio C Farris is a 67 y.o. female (: 1945) presenting to address:    Chief Complaint   Patient presents with    Follow-up       Vitals:    11/10/17 1447   BP: 133/73   Pulse: 98   Resp: 18   Temp: 97.3 °F (36.3 °C)   SpO2: 93%   Weight: 124 lb 12.8 oz (56.6 kg)   Height: 5' 4\" (1.626 m)   PainSc:   0 - No pain       Hearing/Vision:   No exam data present    Learning Assessment:     Learning Assessment 2014   PRIMARY LEARNER Patient   HIGHEST LEVEL OF EDUCATION - PRIMARY LEARNER  GRADUATED HIGH SCHOOL OR GED   BARRIERS PRIMARY LEARNER NONE   CO-LEARNER CAREGIVER No   PRIMARY LANGUAGE ENGLISH   LEARNER PREFERENCE PRIMARY OTHER (COMMENT)   ANSWERED BY Patient   RELATIONSHIP SELF     Depression Screening:     PHQ over the last two weeks 3/4/2014   Little interest or pleasure in doing things Nearly every day   Feeling down, depressed or hopeless Nearly every day   Total Score PHQ 2 6   Trouble falling or staying asleep, or sleeping too much Nearly every day   Feeling tired or having little energy Not at all   Poor appetite or overeating Several days   Feeling bad about yourself - or that you are a failure or have let yourself or your family down Nearly every day   Trouble concentrating on things such as school, work, reading or watching TV Nearly every day   Moving or speaking so slowly that other people could have noticed; or the opposite being so fidgety that others notice Nearly every day   Thoughts of being better off dead, or hurting yourself in some way Nearly every day   How difficult have these problems made it for you to do your work, take care of your home and get along with others Extremely difficult     Fall Risk Assessment:     Fall Risk Assessment, last 12 mths 11/10/2017   Able to walk? Yes   Fall in past 12 months? Yes   Fall with injury? No   Number of falls in past 12 months 1   Fall Risk Score 1     Abuse Screening:   No flowsheet data found. Coordination of Care Questionaire:   1.  Have you been to the ER, urgent care clinic since your last visit? Hospitalized since your last visit? NO    2. Have you seen or consulted any other health care providers outside of the [address] [place] [address] since your last visit? Include any pap smears or colon screening. NO    Advanced Directive:   1. Do you have an Advanced Directive? YES    2. Would you like information on Advanced Directives?  NO

## 2017-11-10 NOTE — PROGRESS NOTES
HISTORY OF PRESENT ILLNESS  Marijane Hodgkins is a 67 y.o. female. HPI  Copd improved  Review of Systems   All other systems reviewed and are negative. Past Medical History:   Diagnosis Date    Bronchiolitis     Cough     GERD (gastroesophageal reflux disease)     went for surgery for it    Hypertension     JRA (juvenile rheumatoid arthritis) ([facility] [address]      Current Outpatient Prescriptions on File Prior to Visit   Medication Sig Dispense Refill    cefdinir (OMNICEF) 300 mg capsule Take 1 Cap by mouth two (2) times a day for 10 days. Start on Saturday 20 Cap 0    methylPREDNISolone (MEDROL DOSEPACK) 4 mg tablet Start on Saturday 1 Dose Pack 0    chlorpheniramine-HYDROcodone (TUSSIONEX) 10-8 mg/5 mL suspension Take 5 mL by mouth every twelve (12) hours as needed for Cough. Max Daily Amount: 10 mL. 200 mL 0    acetylcysteine (MUCOMYST) 100 mg/mL (10 %) nebulizer solution Take 4 mL by inhalation two (2) times a day. 120 mL 0    ZONALON, PRUDOXIN (DOXEPIN, ZONALON, PRUDOXIN, 5% CREAM) 5 % topical cream Apply  to affected area three (3) times daily. 30 g 0    metoprolol tartrate (LOPRESSOR) 25 mg tablet TAKE 1 TABLET BY MOUTH TWICE DAILY 180 Tab 0    albuterol-ipratropium (DUO-NEB) 2.5 mg-0.5 mg/3 ml nebu 3 mL by Nebulization route every six (6) hours as needed. Use 1 vial t.i.d. Via nebulizer prn 360 Nebule 1    clonazePAM (KLONOPIN) 0.5 mg tablet Take 1 Tab by mouth two (2) times a day. Max Daily Amount: 1 mg. 30 Tab 0    cholecalciferol (VITAMIN D3) 1,000 unit cap Take  by mouth two (2) times a day.  hydrOXYzine (ATARAX) 25 mg tablet Take 1 Tab by mouth three (3) times daily as needed for Itching. 60 Tab 3    ammonium lactate (LAC-HYDRIN) 12 % lotion rub in to affected area well twice a day as needed 400 mL 3    budesonide (PULMICORT) 0.5 mg/2 mL nbsp 2 mL by Nebulization route two (2) times a day.  (Patient taking differently: 500 mcg by Nebulization route two (2) times daily as needed.) 60 Each 3    levothyroxine (SYNTHROID) 100 mcg tablet Take 1 Tab by mouth Daily (before breakfast). 90 Tab 3    betamethasone dipropionate (DIPROSONE) 0.05 % topical cream Apply  to affected area two (2) times a day. (Patient taking differently: Apply  to affected area as needed.) 30 g 3    oxyCODONE IR (ROXICODONE) 10 mg tab immediate release tablet Take 20 mg by mouth every four (4) hours as needed.  Calcium Carbonate-Vit D3-Min (CALTRATE 600+D PLUS MINERALS) 600 mg calcium- 400 unit Tab Take  by mouth daily.  multivitamins-minerals-lutein (CENTRUM SILVER) Tab Take  by mouth daily.  promethazine (PHENERGAN) 25 mg tablet Take 1 Tab by mouth every six (6) hours as needed for Nausea. 30 Tab 0    LORazepam (ATIVAN) 1 mg tablet Take 1 Tab by mouth every four (4) hours as needed for Anxiety. Max Daily Amount: 6 mg. 30 Tab 0    cloNIDine HCl (CATAPRES) 0.1 mg tablet Take 1 Tab by mouth two (2) times a day. 60 Tab 2    methylPREDNISolone (MEDROL DOSEPACK) 4 mg tablet Take as directed 1 Dose Pack 0    chlorpheniramine-HYDROcodone (TUSSIONEX) 10-8 mg/5 mL suspension Take 5 mL by mouth every twelve (12) hours as needed for Cough. Max Daily Amount: 10 mL. May fill on 08/05/17 200 mL 0    VANCOMYCIN/0.9 % SOD CHLORIDE (VANCOMYCIN IN 0.9% SODIUM CL) 750 mg/150 mL pgbk by IntraVENous route.  warfarin (COUMADIN) 1 mg tablet Take 1 Tab by mouth daily. (Patient taking differently: Take 1 mg by mouth daily. Indications: patient taking 3 mg per day) 30 Tab 3    OTHER Test INR as directed  I82.629 1 Each 0    HYDROmorphone (DILAUDID) 1 mg/mL liqd oral solution 4 ml 1 hour before surgery as directed 8 mL 0    Saliva Substitution Combo No.3 spra 1-2 sprays 3 times daily as directed 40 mL 3    guaiFENesin (MUCINEX) 1,200 mg Ta12 ER tablet Take 1 Tab by mouth two (2) times a day. 20 Tab 0    desloratadine (CLARINEX) 5 mg tablet Take 1 Tab by mouth daily.  30 Tab 0    erythromycin 500 mg tablet Take 150 mg by mouth three (3) times daily.  melatonin 3 mg tablet Take 3 mg by mouth nightly.  LORazepam (ATIVAN) 1 mg tablet 1 every 8 hours for breakthrough anxiety 40 Tab 0    estrogen, conjugated,-medroxyPROGESTERone (PREMPRO) 0.625-2.5 mg per tablet Take 1 Tab by mouth daily. 90 Tab 1     No current facility-administered medications on file prior to visit. Visit Vitals    /73    Pulse 98    Temp 97.3 °F (36.3 °C)    Resp 18    Ht 5' 4\" (1.626 m)    Wt 124 lb 12.8 oz (56.6 kg)    SpO2 93%    BMI 21.42 kg/m2         Physical Exam   Constitutional: She appears well-developed and well-nourished. No distress. Pulmonary/Chest: Effort normal. No respiratory distress. She has wheezes. She has no rales. She exhibits no tenderness. ronchi x 2     Musculoskeletal: Normal range of motion. She exhibits no edema, tenderness or deformity. Skin: She is not diaphoretic. Vitals reviewed.       ASSESSMENT and PLAN  copd   Plan  Continue rx

## 2017-11-10 NOTE — MR AVS SNAPSHOT
Visit Information Date & Time Provider Department Dept. Phone Encounter #  
 11/10/2017  2:45 PM Paige Pickens, 3 [facility] [phone] [id number] Follow-up Instructions Return if symptoms worsen or fail to improve. Follow-up and Disposition History Upcoming Health Maintenance Date Due Hepatitis C Screening 1945 COLONOSCOPY 5/2/1963 DTaP/Tdap/Td series (1 - Tdap) 5/2/1966 ZOSTER VACCINE AGE 60> 3/2/2005 MEDICARE YEARLY EXAM 6/24/2016 BREAST CANCER SCRN MAMMOGRAM 6/13/2019 GLAUCOMA SCREENING Q2Y 10/30/2019 Allergies as of 11/10/2017  Review Complete On: 11/10/2017 By: Paige Pickens MD  
  
 Severity Noted Reaction Type Reactions Amoxicillin  08/23/2010    Unknown (comments) Avelox [Moxifloxacin]  08/23/2010    Unknown (comments) Celexa [Citalopram]  08/23/2010    Unknown (comments) Ciprofloxacin  08/23/2010    Unknown (comments) Doxycycline  01/17/2017    Nausea and Vomiting Levaquin [Levofloxacin]  08/23/2010    Unknown (comments) Paxil [Paroxetine Hcl]  08/23/2010    Unknown (comments) Sulfa (Sulfonamide Antibiotics)  08/23/2010    Unknown (comments) Joints ache Tequin [Gatifloxacin]  08/23/2010    Unknown (comments) Current Immunizations  Reviewed on 10/2/2017 Name Date Influenza High Dose Vaccine PF 10/2/2017 11:24 AM, 10/31/2016  2:06 PM, 10/20/2015 Influenza Vaccine 10/22/2013 Influenza Vaccine (Quad) 11/24/2014 12:17 PM  
 Influenza Vaccine Split 11/16/2011 Not reviewed this visit You Were Diagnosed With   
  
 Codes Comments Moderate persistent asthma without complication    -  Primary ICD-10-CM: J45.40 ICD-9-CM: 493.90 Anxiety state     ICD-10-CM: F41.1 ICD-9-CM: 300.00 Gastroesophageal reflux disease without esophagitis     ICD-10-CM: K21.9 ICD-9-CM: 530.81 Hypothyroidism, unspecified type     ICD-10-CM: E03.9 ICD-9-CM: 244.9 Polyarticular juvenile rheumatoid arthritis, chronic (HCC)     ICD-10-CM: M08.3 ICD-9-CM: 714.30 Vitals BP Pulse Temp Resp Height(growth percentile) Weight(growth percentile) 133/73 98 97.3 °F (36.3 °C) 18 5' 4\" (1.626 m) 124 lb 12.8 oz (56.6 kg) SpO2 BMI OB Status Smoking Status 93% 21.42 kg/m2 Postmenopausal Never Smoker BMI and BSA Data Body Mass Index Body Surface Area  
 21.42 kg/m 2 1.6 m 2 Preferred Pharmacy Pharmacy Name Phone [facility], [address] [place] [address] [phone] Your Updated Medication List  
  
   
This list is accurate as of: 11/10/17  3:31 PM.  Always use your most recent med list.  
  
  
  
  
 acetylcysteine 100 mg/mL (10 %) nebulizer solution Commonly known as:  MUCOMYST Take 4 mL by inhalation two (2) times a day. albuterol-ipratropium 2.5 mg-0.5 mg/3 ml Nebu Commonly known as:  DUO-NEB  
3 mL by Nebulization route every six (6) hours as needed. Use 1 vial t.i.d. Via nebulizer prn  
  
 ammonium lactate 12 % lotion Commonly known as:  LAC-HYDRIN  
rub in to affected area well twice a day as needed  
  
 betamethasone dipropionate 0.05 % topical cream  
Commonly known as:  Reubin [place] Apply  to affected area two (2) times a day. budesonide 0.5 mg/2 mL Nbsp Commonly known as:  PULMICORT  
2 mL by Nebulization route two (2) times a day. CALTRATE 600+D PLUS MINERALS 600 mg calcium- 400 unit Tab Generic drug:  Calcium Carbonate-Vit D3-Min Take  by mouth daily. cefdinir 300 mg capsule Commonly known as:  OMNICEF Take 1 Cap by mouth two (2) times a day for 10 days. Start on Saturday CENTRUM SILVER Tab tablet Generic drug:  multivitamins-minerals-lutein Take  by mouth daily. chlorpheniramine-HYDROcodone 10-8 mg/5 mL suspension Commonly known as:  Yinka Erazo Take 5 mL by mouth every twelve (12) hours as needed for Cough. Max Daily Amount: 10 mL. clonazePAM 0.5 mg tablet Commonly known as:  Soha [place] Take 1 Tab by mouth two (2) times a day. Max Daily Amount: 1 mg.  
  
 desloratadine 5 mg tablet Commonly known as:  CLARINEX Take 1 Tab by mouth daily. hydrOXYzine HCl 25 mg tablet Commonly known as:  ATARAX Take 1 Tab by mouth three (3) times daily as needed for Itching. levothyroxine 100 mcg tablet Commonly known as:  SYNTHROID Take 1 Tab by mouth Daily (before breakfast). melatonin 3 mg tablet Take 3 mg by mouth nightly. methylPREDNISolone 4 mg tablet Commonly known as:  Cleta Loss Start on Saturday  
  
 metoprolol tartrate 25 mg tablet Commonly known as:  LOPRESSOR  
TAKE 1 TABLET BY MOUTH TWICE DAILY  
  
 oxyCODONE IR 10 mg Tab immediate release tablet Commonly known as:  Priscilla Izabella Take 20 mg by mouth every four (4) hours as needed. VITAMIN D3 1,000 unit Cap Generic drug:  cholecalciferol Take  by mouth two (2) times a day. ZONALON, PRUDOXIN 5 % topical cream  
Commonly known as:  doxepin (ZONALON, PRUDOXIN) 5% cream  
Apply  to affected area three (3) times daily. Follow-up Instructions Return if symptoms worsen or fail to improve. [facility] & HEALTH SERVICES! Dear Toni Wang: Thank you for requesting a Brandpotion account. Our records indicate that you already have an active Brandpotion account. You can access your account anytime at [email or web address] Did you know that you can access your hospital and ER discharge instructions at any time in Brandpotion? You can also review all of your test results from your hospital stay or ER visit. Additional Information If you have questions, please visit the Frequently Asked Questions section of the Brandpotion website at [email or web address] Remember, MyChart is NOT to be used for urgent needs. For medical emergencies, dial 911. Now available from your iPhone and Android! Please provide this summary of care documentation to your next provider. Your primary care clinician is listed as Joy Santos. If you have any questions after today's visit, please call [phone].

## 2017-12-01 ENCOUNTER — OFFICE VISIT (OUTPATIENT)
Dept: FAMILY MEDICINE CLINIC | Age: 72
End: 2017-12-01

## 2017-12-01 VITALS
RESPIRATION RATE: 16 BRPM | WEIGHT: 124 LBS | BODY MASS INDEX: 21.17 KG/M2 | SYSTOLIC BLOOD PRESSURE: 122 MMHG | OXYGEN SATURATION: 93 % | TEMPERATURE: 96.6 F | HEIGHT: 64 IN | DIASTOLIC BLOOD PRESSURE: 27 MMHG | HEART RATE: 95 BPM

## 2017-12-01 DIAGNOSIS — M08.3 POLYARTICULAR JUVENILE RHEUMATOID ARTHRITIS, CHRONIC (HCC): Chronic | ICD-10-CM

## 2017-12-01 DIAGNOSIS — J45.40 MODERATE PERSISTENT ASTHMA WITHOUT COMPLICATION: Chronic | ICD-10-CM

## 2017-12-01 DIAGNOSIS — R05.3 CHRONIC COUGH: ICD-10-CM

## 2017-12-01 DIAGNOSIS — F41.1 ANXIETY STATE: Primary | Chronic | ICD-10-CM

## 2017-12-01 DIAGNOSIS — K11.7 XEROSTOMIA: ICD-10-CM

## 2017-12-01 DIAGNOSIS — E03.9 HYPOTHYROIDISM, UNSPECIFIED TYPE: Chronic | ICD-10-CM

## 2017-12-01 DIAGNOSIS — J32.4 CHRONIC PANSINUSITIS: ICD-10-CM

## 2017-12-01 DIAGNOSIS — K21.9 GASTROESOPHAGEAL REFLUX DISEASE WITHOUT ESOPHAGITIS: Chronic | ICD-10-CM

## 2017-12-01 RX ORDER — HYDROCODONE POLISTIREX AND CHLORPHENIRAMINE POLISTIREX 10; 8 MG/5ML; MG/5ML
5 SUSPENSION, EXTENDED RELEASE ORAL
Qty: 200 ML | Refills: 0 | Status: SHIPPED | OUTPATIENT
Start: 2017-12-01 | End: 2018-01-09 | Stop reason: ALTCHOICE

## 2017-12-01 RX ORDER — PILOCARPINE HYDROCHLORIDE 5 MG/1
5 TABLET, FILM COATED ORAL 2 TIMES DAILY
Qty: 60 TAB | Refills: 0 | Status: SHIPPED | OUTPATIENT
Start: 2017-12-01 | End: 2018-03-28 | Stop reason: SDUPTHER

## 2017-12-01 NOTE — MR AVS SNAPSHOT
Visit Information Date & Time Provider Department Dept. Phone Encounter #  
 12/1/2017  2:45 PM Keila Ortiz, 3 [facility] [phone] [id number] Follow-up Instructions Return pending results. Follow-up and Disposition History Upcoming Health Maintenance Date Due Hepatitis C Screening 1945 COLONOSCOPY 5/2/1963 DTaP/Tdap/Td series (1 - Tdap) 5/2/1966 ZOSTER VACCINE AGE 60> 3/2/2005 MEDICARE YEARLY EXAM 6/24/2016 BREAST CANCER SCRN MAMMOGRAM 6/13/2019 GLAUCOMA SCREENING Q2Y 10/30/2019 Allergies as of 12/1/2017  Review Complete On: 12/1/2017 By: Keila Ortiz MD  
  
 Severity Noted Reaction Type Reactions Amoxicillin  08/23/2010    Unknown (comments) Avelox [Moxifloxacin]  08/23/2010    Unknown (comments) Celexa [Citalopram]  08/23/2010    Unknown (comments) Ciprofloxacin  08/23/2010    Unknown (comments) Doxycycline  01/17/2017    Nausea and Vomiting Levaquin [Levofloxacin]  08/23/2010    Unknown (comments) Paxil [Paroxetine Hcl]  08/23/2010    Unknown (comments) Sulfa (Sulfonamide Antibiotics)  08/23/2010    Unknown (comments) Joints ache Tequin [Gatifloxacin]  08/23/2010    Unknown (comments) Current Immunizations  Reviewed on 10/2/2017 Name Date Influenza High Dose Vaccine PF 10/2/2017 11:24 AM, 10/31/2016  2:06 PM, 10/20/2015 Influenza Vaccine 10/22/2013 Influenza Vaccine (Quad) 11/24/2014 12:17 PM  
 Influenza Vaccine Split 11/16/2011 Not reviewed this visit You Were Diagnosed With   
  
 Codes Comments Anxiety state    -  Primary ICD-10-CM: F41.1 ICD-9-CM: 300.00 Gastroesophageal reflux disease without esophagitis     ICD-10-CM: K21.9 ICD-9-CM: 530.81 Hypothyroidism, unspecified type     ICD-10-CM: E03.9 ICD-9-CM: 244.9 Polyarticular juvenile rheumatoid arthritis, chronic (HCC)     ICD-10-CM: M08.3 ICD-9-CM: 714.30   
 Moderate persistent asthma without complication     QCZ-90-MERI: J45.40 ICD-9-CM: 493.90 Chronic cough     ICD-10-CM: R05 ICD-9-CM: 765. 2 Xerostomia     ICD-10-CM: R68.2 ICD-9-CM: 527.7 Chronic pansinusitis     ICD-10-CM: J32.4 ICD-9-CM: 473.8 Vitals BP Pulse Temp Resp Height(growth percentile) Weight(growth percentile) (!) 122/27 (BP 1 Location: Right arm, BP Patient Position: Sitting) 95 96.6 °F (35.9 °C) (Oral) 16 5' 4\" (1.626 m) 124 lb (56.2 kg) SpO2 BMI OB Status Smoking Status 93% 21.28 kg/m2 Postmenopausal Never Smoker BMI and BSA Data Body Mass Index Body Surface Area  
 21.28 kg/m 2 1.59 m 2 Preferred Pharmacy Pharmacy Name Phone [facility] [phone], [address] [phone] Your Updated Medication List  
  
   
This list is accurate as of: 12/1/17  3:22 PM.  Always use your most recent med list.  
  
  
  
  
 acetylcysteine 100 mg/mL (10 %) nebulizer solution Commonly known as:  MUCOMYST Take 4 mL by inhalation two (2) times a day. albuterol-ipratropium 2.5 mg-0.5 mg/3 ml Nebu Commonly known as:  DUO-NEB  
3 mL by Nebulization route every six (6) hours as needed. Use 1 vial t.i.d. Via nebulizer prn  
  
 ammonium lactate 12 % lotion Commonly known as:  LAC-HYDRIN  
rub in to affected area well twice a day as needed  
  
 betamethasone dipropionate 0.05 % topical cream  
Commonly known as:  Kika Andino Apply  to affected area two (2) times a day. budesonide 0.5 mg/2 mL Nbsp Commonly known as:  PULMICORT  
2 mL by Nebulization route two (2) times a day. CALTRATE 600+D PLUS MINERALS 600 mg calcium- 400 unit Tab Generic drug:  Calcium Carbonate-Vit D3-Min Take  by mouth daily. CENTRUM SILVER Tab tablet Generic drug:  multivitamins-minerals-lutein Take  by mouth daily. chlorpheniramine-HYDROcodone 10-8 mg/5 mL suspension Commonly known as:  Aureliano Field Take 5 mL by mouth every twelve (12) hours as needed for Cough. Max Daily Amount: 10 mL. clonazePAM 0.5 mg tablet Commonly known as:  Prakash Violetta Take 1 Tab by mouth two (2) times a day. Max Daily Amount: 1 mg.  
  
 desloratadine 5 mg tablet Commonly known as:  CLARINEX Take 1 Tab by mouth daily. hydrOXYzine HCl 25 mg tablet Commonly known as:  ATARAX Take 1 Tab by mouth three (3) times daily as needed for Itching. levothyroxine 100 mcg tablet Commonly known as:  SYNTHROID Take 1 Tab by mouth Daily (before breakfast). methylPREDNISolone 4 mg tablet Commonly known as:  [place] Start on Saturday  
  
 metoprolol tartrate 25 mg tablet Commonly known as:  LOPRESSOR  
TAKE 1 TABLET BY MOUTH TWICE DAILY  
  
 oxyCODONE IR 10 mg Tab immediate release tablet Commonly known as:  Ever Ivans Take 20 mg by mouth every four (4) hours as needed. pilocarpine 5 mg tablet Commonly known as:  SALAGEN (PILOCARPINE) Take 1 Tab by mouth two (2) times a day. VITAMIN D3 1,000 unit Cap Generic drug:  cholecalciferol Take  by mouth two (2) times a day. ZONALON, PRUDOXIN 5 % topical cream  
Commonly known as:  doxepin (ZONALON, PRUDOXIN) 5% cream  
Apply  to affected area three (3) times daily. Prescriptions Printed Refills  
 chlorpheniramine-HYDROcodone (TUSSIONEX) 10-8 mg/5 mL suspension 0 Sig: Take 5 mL by mouth every twelve (12) hours as needed for Cough. Max Daily Amount: 10 mL. Class: Print Route: Oral  
  
Prescriptions Sent to Pharmacy Refills  
 pilocarpine (SALAGEN, PILOCARPINE,) 5 mg tablet 0 Sig: Take 1 Tab by mouth two (2) times a day. Class: Normal  
 Pharmacy: [facility] [address] [place] [address], [address] [place] [address] #: [phone]  Route: Oral  
  
 Follow-up Instructions Return pending results. To-Do List   
 12/01/2017 Imaging:  [facility] Introducing [facility] & HEALTH SERVICES! Dear Tre Class: Thank you for requesting a [email or web address] account. Our records indicate that you already have an active [email or web address] account. You can access your account anytime at [email or web address] Did you know that you can access your hospital and ER discharge instructions at any time in [email or web address]? You can also review all of your test results from your hospital stay or ER visit. Additional Information If you have questions, please visit the Frequently Asked Questions section of the [email or web address] website at [email or web address] Remember, [email or web address] is NOT to be used for urgent needs. For medical emergencies, dial 911. Now available from your iPhone and Android! Please provide this summary of care documentation to your next provider. Your primary care clinician is listed as Dayday Longoria. If you have any questions after today's visit, please call [phone].

## 2017-12-01 NOTE — PROGRESS NOTES
HISTORY OF PRESENT ILLNESS  Alyx East is a 67 y.o. female. HPI  Chronic asthma stable  Chronic cough active despite control of lung disease  A/w sinus congestion  A/w dry mouth    Review of Systems   HENT: Positive for congestion. Respiratory: Positive for cough. All other systems reviewed and are negative. Past Medical History:   Diagnosis Date    Bronchiolitis     Cough     GERD (gastroesophageal reflux disease)     went for surgery for it    Hypertension     JRA (juvenile rheumatoid arthritis) ([facility]      Current Outpatient Prescriptions on File Prior to Visit   Medication Sig Dispense Refill    methylPREDNISolone (MEDROL DOSEPACK) 4 mg tablet Start on Saturday 1 Dose Pack 0    chlorpheniramine-HYDROcodone (TUSSIONEX) 10-8 mg/5 mL suspension Take 5 mL by mouth every twelve (12) hours as needed for Cough. Max Daily Amount: 10 mL. 200 mL 0    acetylcysteine (MUCOMYST) 100 mg/mL (10 %) nebulizer solution Take 4 mL by inhalation two (2) times a day. 120 mL 0    ZONALON, PRUDOXIN (DOXEPIN, ZONALON, PRUDOXIN, 5% CREAM) 5 % topical cream Apply  to affected area three (3) times daily. 30 g 0    metoprolol tartrate (LOPRESSOR) 25 mg tablet TAKE 1 TABLET BY MOUTH TWICE DAILY 180 Tab 0    albuterol-ipratropium (DUO-NEB) 2.5 mg-0.5 mg/3 ml nebu 3 mL by Nebulization route every six (6) hours as needed. Use 1 vial t.i.d. Via nebulizer prn 360 Nebule 1    desloratadine (CLARINEX) 5 mg tablet Take 1 Tab by mouth daily. 30 Tab 0    clonazePAM (KLONOPIN) 0.5 mg tablet Take 1 Tab by mouth two (2) times a day. Max Daily Amount: 1 mg. 30 Tab 0    cholecalciferol (VITAMIN D3) 1,000 unit cap Take  by mouth two (2) times a day.  hydrOXYzine (ATARAX) 25 mg tablet Take 1 Tab by mouth three (3) times daily as needed for Itching.  60 Tab 3    ammonium lactate (LAC-HYDRIN) 12 % lotion rub in to affected area well twice a day as needed 400 mL 3    budesonide (PULMICORT) 0.5 mg/2 mL nbsp 2 mL by Nebulization route two (2) times a day. (Patient taking differently: 500 mcg by Nebulization route two (2) times daily as needed.) 60 Each 3    levothyroxine (SYNTHROID) 100 mcg tablet Take 1 Tab by mouth Daily (before breakfast). 90 Tab 3    betamethasone dipropionate (DIPROSONE) 0.05 % topical cream Apply  to affected area two (2) times a day. (Patient taking differently: Apply  to affected area as needed.) 30 g 3    oxyCODONE IR (ROXICODONE) 10 mg tab immediate release tablet Take 20 mg by mouth every four (4) hours as needed.  Calcium Carbonate-Vit D3-Min (CALTRATE 600+D PLUS MINERALS) 600 mg calcium- 400 unit Tab Take  by mouth daily.  multivitamins-minerals-lutein (CENTRUM SILVER) Tab Take  by mouth daily. No current facility-administered medications on file prior to visit. Visit Vitals    BP (!) 122/27 (BP 1 Location: Right arm, BP Patient Position: Sitting)    Pulse 95    Temp 96.6 °F (35.9 °C) (Oral)    Resp 16    Ht 5' 4\" (1.626 m)    Wt 124 lb (56.2 kg)    SpO2 93%    BMI 21.28 kg/m2         Physical Exam   Constitutional: She appears well-developed and well-nourished. No distress. HENT:   Head: Normocephalic and atraumatic. Mouth/Throat: Oropharyngeal exudate present. Tm dull x 2  Dry mouth   Neck: Normal range of motion. Neck supple. No thyromegaly present. Lymphadenopathy:     She has no cervical adenopathy. Skin: She is not diaphoretic. Vitals reviewed.       ASSESSMENT and PLAN  chronic cough   Chronic sinus congestion  Xerostomia  Plan  CT sinuses  Salagen 5 mg  F/u pending

## 2017-12-26 DIAGNOSIS — R05.3 CHRONIC COUGH: ICD-10-CM

## 2017-12-26 DIAGNOSIS — J20.9 ACUTE BRONCHITIS, UNSPECIFIED ORGANISM: ICD-10-CM

## 2017-12-26 DIAGNOSIS — J45.991 COUGH VARIANT ASTHMA: Primary | ICD-10-CM

## 2017-12-26 RX ORDER — HYDROCODONE POLISTIREX AND CHLORPHENIRAMINE POLISTIREX 10; 8 MG/5ML; MG/5ML
5 SUSPENSION, EXTENDED RELEASE ORAL
Qty: 200 ML | Refills: 0 | OUTPATIENT
Start: 2017-12-26

## 2017-12-26 NOTE — TELEPHONE ENCOUNTER
Spoke with patient. Verified with 2 identifiers. Advised of below. Patient stated that she does not have any appointments scheduled. Made appointment over phone for 1/09/2018. Patient stated that she does not feel she needs to come in she is very \"tired\" and says she has not slept in 3 days. Patient states that she is completely out of medication and has been \"through the holidays\". Requesting refill to last her to January appointment.

## 2017-12-26 NOTE — TELEPHONE ENCOUNTER
She seems to be using it more than usual  Was filled on 12/01 for 30 days  Confirm appt at the very beginning of January  Can her current supply last till then?

## 2017-12-27 DIAGNOSIS — J45.991 COUGH VARIANT ASTHMA: Primary | ICD-10-CM

## 2017-12-27 RX ORDER — HYDROCODONE POLISTIREX AND CHLORPHENIRAMINE POLISTIREX 10; 8 MG/5ML; MG/5ML
5 SUSPENSION, EXTENDED RELEASE ORAL
Qty: 100 ML | Refills: 0 | Status: SHIPPED | OUTPATIENT
Start: 2017-12-27 | End: 2018-01-09 | Stop reason: SDUPTHER

## 2017-12-27 NOTE — TELEPHONE ENCOUNTER
Patient called and would like to come in an get the script for the Tussionex today. It is needed to be printed off for her to .      Please assist

## 2018-01-09 ENCOUNTER — TELEPHONE (OUTPATIENT)
Dept: FAMILY MEDICINE CLINIC | Age: 73
End: 2018-01-09

## 2018-01-09 ENCOUNTER — OFFICE VISIT (OUTPATIENT)
Dept: FAMILY MEDICINE CLINIC | Age: 73
End: 2018-01-09

## 2018-01-09 VITALS
SYSTOLIC BLOOD PRESSURE: 112 MMHG | DIASTOLIC BLOOD PRESSURE: 55 MMHG | RESPIRATION RATE: 20 BRPM | TEMPERATURE: 97.2 F | HEART RATE: 110 BPM | BODY MASS INDEX: 21.17 KG/M2 | HEIGHT: 64 IN | WEIGHT: 124 LBS | OXYGEN SATURATION: 95 %

## 2018-01-09 DIAGNOSIS — F32.A DEPRESSIVE DISORDER: ICD-10-CM

## 2018-01-09 DIAGNOSIS — Z96.659 HISTORY OF TOTAL KNEE REPLACEMENT, UNSPECIFIED LATERALITY: ICD-10-CM

## 2018-01-09 DIAGNOSIS — Z96.649 HISTORY OF TOTAL HIP REPLACEMENT, UNSPECIFIED LATERALITY: ICD-10-CM

## 2018-01-09 DIAGNOSIS — F41.1 ANXIETY STATE: Chronic | ICD-10-CM

## 2018-01-09 DIAGNOSIS — K11.7 XEROSTOMIA: ICD-10-CM

## 2018-01-09 DIAGNOSIS — F33.9 RECURRENT DEPRESSION (HCC): ICD-10-CM

## 2018-01-09 DIAGNOSIS — E03.9 HYPOTHYROIDISM, UNSPECIFIED TYPE: Chronic | ICD-10-CM

## 2018-01-09 DIAGNOSIS — R05.3 CHRONIC COUGH: ICD-10-CM

## 2018-01-09 DIAGNOSIS — M81.0 OSTEOPOROSIS, UNSPECIFIED OSTEOPOROSIS TYPE, UNSPECIFIED PATHOLOGICAL FRACTURE PRESENCE: ICD-10-CM

## 2018-01-09 DIAGNOSIS — J45.40 MODERATE PERSISTENT ASTHMA WITHOUT COMPLICATION: Primary | Chronic | ICD-10-CM

## 2018-01-09 DIAGNOSIS — K21.9 GASTROESOPHAGEAL REFLUX DISEASE WITHOUT ESOPHAGITIS: Chronic | ICD-10-CM

## 2018-01-09 DIAGNOSIS — J45.991 COUGH VARIANT ASTHMA: ICD-10-CM

## 2018-01-09 DIAGNOSIS — J47.9 BRONCHIECTASIS WITHOUT COMPLICATION (HCC): ICD-10-CM

## 2018-01-09 RX ORDER — HYDROCODONE BITARTRATE AND ACETAMINOPHEN 10; 325 MG/1; MG/1
1 TABLET ORAL 3 TIMES DAILY
Qty: 90 TAB | Refills: 0
Start: 2018-01-09 | End: 2018-03-05 | Stop reason: DRUGHIGH

## 2018-01-09 RX ORDER — ZILEUTON 600 MG/1
600 TABLET, MULTILAYER, EXTENDED RELEASE ORAL
Qty: 60 TAB | Refills: 1 | Status: SHIPPED | OUTPATIENT
Start: 2018-01-09 | End: 2018-04-10 | Stop reason: ALTCHOICE

## 2018-01-09 RX ORDER — BUDESONIDE 1 MG/2ML
1000 INHALANT ORAL 2 TIMES DAILY
Qty: 60 EACH | Refills: 1 | Status: SHIPPED | OUTPATIENT
Start: 2018-01-09

## 2018-01-09 RX ORDER — HYDROCODONE POLISTIREX AND CHLORPHENIRAMINE POLISTIREX 10; 8 MG/5ML; MG/5ML
5 SUSPENSION, EXTENDED RELEASE ORAL
Qty: 200 ML | Refills: 0 | Status: SHIPPED | OUTPATIENT
Start: 2018-01-09 | End: 2018-08-17

## 2018-01-09 RX ORDER — HYDROCODONE BITARTRATE AND ACETAMINOPHEN 10; 325 MG/1; MG/1
1 TABLET ORAL
COMMUNITY
End: 2018-03-05 | Stop reason: DRUGHIGH

## 2018-01-09 NOTE — MR AVS SNAPSHOT
Visit Information Date & Time Provider Department Dept. Phone Encounter #  
 1/9/2018  4:00 PM Lelo Jimenez, 3 [facility] [phone] [id number] Follow-up Instructions Return in about 1 month (around 2/9/2018). Follow-up and Disposition History Upcoming Health Maintenance Date Due Hepatitis C Screening 1945 COLONOSCOPY 5/2/1963 DTaP/Tdap/Td series (1 - Tdap) 5/2/1966 ZOSTER VACCINE AGE 60> 3/2/2005 MEDICARE YEARLY EXAM 6/24/2016 BREAST CANCER SCRN MAMMOGRAM 6/13/2019 GLAUCOMA SCREENING Q2Y 10/30/2019 Allergies as of 1/9/2018  Review Complete On: 1/9/2018 By: Lelo Jimenez MD  
  
 Severity Noted Reaction Type Reactions Amoxicillin  08/23/2010    Unknown (comments) Avelox [Moxifloxacin]  08/23/2010    Unknown (comments) Celexa [Citalopram]  08/23/2010    Unknown (comments) Ciprofloxacin  08/23/2010    Unknown (comments) Doxycycline  01/17/2017    Nausea and Vomiting Levaquin [Levofloxacin]  08/23/2010    Unknown (comments) Paxil [Paroxetine Hcl]  08/23/2010    Unknown (comments) Sulfa (Sulfonamide Antibiotics)  08/23/2010    Unknown (comments) Joints ache Tequin [Gatifloxacin]  08/23/2010    Unknown (comments) Current Immunizations  Reviewed on 10/2/2017 Name Date Influenza High Dose Vaccine PF 10/2/2017 11:24 AM, 10/31/2016  2:06 PM, 10/20/2015 Influenza Vaccine 10/22/2013 Influenza Vaccine (Quad) 11/24/2014 12:17 PM  
 Influenza Vaccine Split 11/16/2011 Not reviewed this visit You Were Diagnosed With   
  
 Codes Comments Moderate persistent asthma without complication    -  Primary ICD-10-CM: J45.40 ICD-9-CM: 493.90 Chronic cough     ICD-10-CM: R05 ICD-9-CM: 262. 2 Xerostomia     ICD-10-CM: R68.2 ICD-9-CM: 527.7 Recurrent depression ([facility]     ICD-10-CM: F33.9 ICD-9-CM: 296.30 [facility]ctasis [facility]     FII-81-IJ: J47.9 ICD-9-CM: 494.0 Depressive disorder     ICD-10-CM: F32.9 ICD-9-CM: 748 Osteoporosis, unspecified osteoporosis type, unspecified pathological fracture presence     ICD-10-CM: M81.0 ICD-9-CM: 733.00 History of total knee replacement, unspecified laterality     ICD-10-CM: N02.333 ICD-9-CM: V43.65 History of total hip replacement, unspecified laterality     ICD-10-CM: G47.174 ICD-9-CM: V43.64 Anxiety state     ICD-10-CM: F41.1 ICD-9-CM: 300.00 Gastroesophageal reflux disease without esophagitis     ICD-10-CM: K21.9 ICD-9-CM: 530.81 Hypothyroidism, unspecified type     ICD-10-CM: E03.9 ICD-9-CM: 244.9 Cough variant asthma     ICD-10-CM: J45.991 ICD-9-CM: 961.92 Vitals BP Pulse Temp Resp Height(growth percentile) Weight(growth percentile) 112/55 (!) 110 97.2 °F (36.2 °C) 20 5' 4\" (1.626 m) 124 lb (56.2 kg) SpO2 BMI OB Status Smoking Status 95% 21.28 kg/m2 Postmenopausal Never Smoker BMI and BSA Data Body Mass Index Body Surface Area  
 21.28 kg/m 2 1.59 m 2 Preferred Pharmacy Pharmacy Name Phone [phone], [address] [phone] Your Updated Medication List  
  
   
This list is accurate as of: 1/9/18  4:47 PM.  Always use your most recent med list.  
  
  
  
  
 acetylcysteine 100 mg/mL (10 %) nebulizer solution Commonly known as:  MUCOMYST Take 4 mL by inhalation two (2) times a day. albuterol-ipratropium 2.5 mg-0.5 mg/3 ml Nebu Commonly known as:  DUO-NEB  
3 mL by Nebulization route every six (6) hours as needed. Use 1 vial t.i.d. Via nebulizer prn * budesonide 0.5 mg/2 mL Nbsp Commonly known as:  PULMICORT  
2 mL by Nebulization route two (2) times a day. * budesonide 1 mg/2 mL Nbsp Commonly known as:  PULMICORT  
2 mL by Nebulization route two (2) times a day. CALTRATE 600+D PLUS MINERALS 600 mg calcium- 400 unit Tab Generic drug:  Calcium Carbonate-Vit D3-Min Take  by mouth daily. CENTRUM SILVER Tab tablet Generic drug:  multivitamins-minerals-lutein Take  by mouth daily. chlorpheniramine-HYDROcodone 10-8 mg/5 mL suspension Commonly known as:  Shaaron Linear Take 5 mL by mouth every twelve (12) hours as needed for Cough. Max Daily Amount: 10 mL. clonazePAM 0.5 mg tablet Commonly known as:  Елена Alejandro Take 1 Tab by mouth two (2) times a day. Max Daily Amount: 1 mg.  
  
 desloratadine 5 mg tablet Commonly known as:  CLARINEX Take 1 Tab by mouth daily. * HYDROcodone-acetaminophen  mg tablet Commonly known as:  Rudine [place] Take 1 Tab by mouth three (3) times daily as needed for Pain. * HYDROcodone-acetaminophen  mg tablet Commonly known as:  Rudine Claudia Take 1 Tab by mouth three (3) times daily. Max Daily Amount: 3 Tabs. levothyroxine 100 mcg tablet Commonly known as:  SYNTHROID Take 1 Tab by mouth Daily (before breakfast). metoprolol tartrate 25 mg tablet Commonly known as:  LOPRESSOR  
TAKE 1 TABLET BY MOUTH TWICE DAILY pilocarpine 5 mg tablet Commonly known as:  SALAGEN (PILOCARPINE) Take 1 Tab by mouth two (2) times a day. VITAMIN D3 1,000 unit Cap Generic drug:  cholecalciferol Take  by mouth two (2) times a day. zileuton  mg tablet Commonly known as:  ZYFLO CR Take 1 Tab by mouth Before breakfast and dinner. ZONALON, PRUDOXIN 5 % topical cream  
Commonly known as:  doxepin (ZONALON, PRUDOXIN) 5% cream  
Apply  to affected area three (3) times daily. * Notice: This list has 4 medication(s) that are the same as other medications prescribed for you. Read the directions carefully, and ask your doctor or other care provider to review them with you. Prescriptions Printed Refills chlorpheniramine-HYDROcodone (TUSSIONEX) 10-8 mg/5 mL suspension 0 Sig: Take 5 mL by mouth every twelve (12) hours as needed for Cough. Max Daily Amount: 10 mL. Class: Print Route: Oral  
  
Prescriptions Sent to Pharmacy Refills  
 zileuton ER (ZYFLO CR) 600 mg tablet 1 Sig: Take 1 Tab by mouth Before breakfast and dinner. Class: Normal  
 Pharmacy: [facility] [address] [place] [address] Rd, [address] #: [phone] Route: Oral  
 budesonide (PULMICORT) 1 mg/2 mL nbsp 1 Si mL by Nebulization route two (2) times a day. Class: Normal  
 Pharmacy: [facility] [address] [place] [address] Rd, [facility] [address] #: [phone] Route: Nebulization Follow-up Instructions Return in about 1 month (around 2018). Introducing [facility]! Dear Alee Weldon: Thank you for requesting a Qpyn account. Our records indicate that you already have an active Qpyn account. You can access your account anytime at [email or web address] Did you know that you can access your hospital and ER discharge instructions at any time in Qpyn? You can also review all of your test results from your hospital stay or ER visit. Additional Information If you have questions, please visit the Frequently Asked Questions section of the Qpyn website at [email or web address] Remember, Qpyn is NOT to be used for urgent needs. For medical emergencies, dial 911. Now available from your iPhone and Android! Please provide this summary of care documentation to your next provider. Your primary care clinician is listed as Lauren Miguel. If you have any questions after today's visit, please call [phone].

## 2018-01-09 NOTE — PROGRESS NOTES
Roger Oh is a 67 y.o. female (: 1945) presenting to address:    Chief Complaint   Patient presents with    Medication Refill       Vitals:    18 1620   BP: 112/55   Pulse: (!) 110   Resp: 20   Temp: 97.2 °F (36.2 °C)   SpO2: 95%   Weight: 124 lb (56.2 kg)   Height: 5' 4\" (1.626 m)   PainSc:   0 - No pain       Hearing/Vision:   No exam data present    Learning Assessment:     Learning Assessment 2014   PRIMARY LEARNER Patient   HIGHEST LEVEL OF EDUCATION - PRIMARY LEARNER  GRADUATED HIGH SCHOOL OR GED   BARRIERS PRIMARY LEARNER NONE   CO-LEARNER CAREGIVER No   PRIMARY LANGUAGE ENGLISH   LEARNER PREFERENCE PRIMARY OTHER (COMMENT)   ANSWERED BY Patient   RELATIONSHIP SELF     Depression Screening:     PHQ over the last two weeks 3/4/2014   Little interest or pleasure in doing things Nearly every day   Feeling down, depressed or hopeless Nearly every day   Total Score PHQ 2 6   Trouble falling or staying asleep, or sleeping too much Nearly every day   Feeling tired or having little energy Not at all   Poor appetite or overeating Several days   Feeling bad about yourself - or that you are a failure or have let yourself or your family down Nearly every day   Trouble concentrating on things such as school, work, reading or watching TV Nearly every day   Moving or speaking so slowly that other people could have noticed; or the opposite being so fidgety that others notice Nearly every day   Thoughts of being better off dead, or hurting yourself in some way Nearly every day   How difficult have these problems made it for you to do your work, take care of your home and get along with others Extremely difficult     Fall Risk Assessment:     Fall Risk Assessment, last 12 mths 2018   Able to walk? No   Fall in past 12 months? -   Fall with injury? -   Number of falls in past 12 months -   Fall Risk Score -     Abuse Screening:   No flowsheet data found. Coordination of Care Questionaire:   1.  Have you been to the ER, urgent care clinic since your last visit? Hospitalized since your last visit? NO    2. Have you seen or consulted any other health care providers outside of the [address] [place] [address] since your last visit? Include any pap smears or colon screening. YES Pain Management    Advanced Directive:   1. Do you have an Advanced Directive? NO    2. Would you like information on Advanced Directives?  NO

## 2018-01-09 NOTE — PROGRESS NOTES
HISTORY OF PRESENT ILLNESS  Konstantin Denise is a 67 y.o. female. HPI  Chronic lung disease with some decompensation recently  Chronic pain syndrome under pain mgmt  Chronic anxiety under psych  Xerostomia improved with Salagen  Review of Systems   Psychiatric/Behavioral: The patient is nervous/anxious. All other systems reviewed and are negative. Past Medical History:   Diagnosis Date    Bronchiolitis     Cough     GERD (gastroesophageal reflux disease)     went for surgery for it    Hypertension     JRA (juvenile rheumatoid arthritis) ([facility] [address]        Current Outpatient Prescriptions:     HYDROcodone-acetaminophen (NORCO)  mg tablet, Take 1 Tab by mouth three (3) times daily as needed for Pain., Disp: , Rfl:     HYDROcodone-acetaminophen (NORCO)  mg tablet, Take 1 Tab by mouth three (3) times daily. Max Daily Amount: 3 Tabs., Disp: 90 Tab, Rfl: 0    chlorpheniramine-HYDROcodone (TUSSIONEX) 10-8 mg/5 mL suspension, Take 5 mL by mouth every twelve (12) hours as needed for Cough. Max Daily Amount: 10 mL., Disp: 100 mL, Rfl: 0    pilocarpine (SALAGEN, PILOCARPINE,) 5 mg tablet, Take 1 Tab by mouth two (2) times a day., Disp: 60 Tab, Rfl: 0    acetylcysteine (MUCOMYST) 100 mg/mL (10 %) nebulizer solution, Take 4 mL by inhalation two (2) times a day., Disp: 120 mL, Rfl: 0    ZONALON, PRUDOXIN (DOXEPIN, ZONALON, PRUDOXIN, 5% CREAM) 5 % topical cream, Apply  to affected area three (3) times daily. , Disp: 30 g, Rfl: 0    metoprolol tartrate (LOPRESSOR) 25 mg tablet, TAKE 1 TABLET BY MOUTH TWICE DAILY, Disp: 180 Tab, Rfl: 0    albuterol-ipratropium (DUO-NEB) 2.5 mg-0.5 mg/3 ml nebu, 3 mL by Nebulization route every six (6) hours as needed. Use 1 vial t.i.d. Via nebulizer prn, Disp: 360 Nebule, Rfl: 1    desloratadine (CLARINEX) 5 mg tablet, Take 1 Tab by mouth daily. , Disp: 30 Tab, Rfl: 0    clonazePAM (KLONOPIN) 0.5 mg tablet, Take 1 Tab by mouth two (2) times a day.  Max Daily Amount: 1 mg., Disp: 30 Tab, Rfl: 0    cholecalciferol (VITAMIN D3) 1,000 unit cap, Take  by mouth two (2) times a day., Disp: , Rfl:     budesonide (PULMICORT) 0.5 mg/2 mL nbsp, 2 mL by Nebulization route two (2) times a day. (Patient taking differently: 500 mcg by Nebulization route two (2) times daily as needed.), Disp: 60 Each, Rfl: 3    levothyroxine (SYNTHROID) 100 mcg tablet, Take 1 Tab by mouth Daily (before breakfast). , Disp: 90 Tab, Rfl: 3    Calcium Carbonate-Vit D3-Min (CALTRATE 600+D PLUS MINERALS) 600 mg calcium- 400 unit Tab, Take  by mouth daily. , Disp: , Rfl:     multivitamins-minerals-lutein (CENTRUM SILVER) Tab, Take  by mouth daily. , Disp: , Rfl:   Visit Vitals    /55    Pulse (!) 110    Temp 97.2 °F (36.2 °C)    Resp 20    Ht 5' 4\" (1.626 m)    Wt 124 lb (56.2 kg)    SpO2 95%    BMI 21.28 kg/m2         Physical Exam   Constitutional: She appears well-developed and well-nourished. No distress. Cardiovascular: Normal rate, regular rhythm, normal heart sounds and intact distal pulses. Exam reveals no gallop and no friction rub. No murmur heard. Pulmonary/Chest: Effort normal. No respiratory distress. She has wheezes. She has no rales. She exhibits no tenderness. Skin: She is not diaphoretic. Psychiatric: She has a normal mood and affect. Her behavior is normal. Judgment and thought content normal.   Vitals reviewed.       ASSESSMENT and PLAN  copd needing better control  Xerostomia  Plan  Increase pulmicort to 1 mg  Consider Prozac  zyflo  Recheck in 1 month  refills

## 2018-02-14 ENCOUNTER — OFFICE VISIT (OUTPATIENT)
Dept: FAMILY MEDICINE CLINIC | Age: 73
End: 2018-02-14

## 2018-02-14 VITALS
TEMPERATURE: 97.5 F | RESPIRATION RATE: 16 BRPM | SYSTOLIC BLOOD PRESSURE: 108 MMHG | BODY MASS INDEX: 18.44 KG/M2 | DIASTOLIC BLOOD PRESSURE: 32 MMHG | WEIGHT: 108 LBS | HEIGHT: 64 IN

## 2018-02-14 DIAGNOSIS — J44.9 CHRONIC OBSTRUCTIVE PULMONARY DISEASE, UNSPECIFIED COPD TYPE (HCC): ICD-10-CM

## 2018-02-14 DIAGNOSIS — D64.9 ANEMIA, UNSPECIFIED TYPE: Primary | ICD-10-CM

## 2018-02-14 LAB — HGB BLD-MCNC: 2.2 G/DL

## 2018-02-14 NOTE — PROGRESS NOTES
HISTORY OF PRESENT ILLNESS  Ashley Babcock is a 67 y.o. female. HPI  Copd stable  C/o weakness , dyspnea last 2 days  No bleeding noted or abdominal pain  Osteomyelitis resolved  Orbital fracture ruled out  DVT resolved  Review of Systems   Constitutional: Positive for malaise/fatigue. Respiratory: Positive for shortness of breath. Neurological: Positive for weakness. All other systems reviewed and are negative. Past Medical History:   Diagnosis Date    Bronchiolitis     Cough     GERD (gastroesophageal reflux disease)     went for surgery for it    Hypertension     JRA (juvenile rheumatoid arthritis) ([facility] [address]        Current Outpatient Prescriptions:     HYDROcodone-acetaminophen (NORCO)  mg tablet, Take 1 Tab by mouth three (3) times daily as needed for Pain., Disp: , Rfl:     HYDROcodone-acetaminophen (NORCO)  mg tablet, Take 1 Tab by mouth three (3) times daily. Max Daily Amount: 3 Tabs., Disp: 90 Tab, Rfl: 0    zileuton ER (ZYFLO CR) 600 mg tablet, Take 1 Tab by mouth Before breakfast and dinner., Disp: 60 Tab, Rfl: 1    budesonide (PULMICORT) 1 mg/2 mL nbsp, 2 mL by Nebulization route two (2) times a day., Disp: 60 Each, Rfl: 1    chlorpheniramine-HYDROcodone (TUSSIONEX) 10-8 mg/5 mL suspension, Take 5 mL by mouth every twelve (12) hours as needed for Cough. Max Daily Amount: 10 mL., Disp: 200 mL, Rfl: 0    pilocarpine (SALAGEN, PILOCARPINE,) 5 mg tablet, Take 1 Tab by mouth two (2) times a day., Disp: 60 Tab, Rfl: 0    acetylcysteine (MUCOMYST) 100 mg/mL (10 %) nebulizer solution, Take 4 mL by inhalation two (2) times a day., Disp: 120 mL, Rfl: 0    ZONALON, PRUDOXIN (DOXEPIN, ZONALON, PRUDOXIN, 5% CREAM) 5 % topical cream, Apply  to affected area three (3) times daily. , Disp: 30 g, Rfl: 0    metoprolol tartrate (LOPRESSOR) 25 mg tablet, TAKE 1 TABLET BY MOUTH TWICE DAILY, Disp: 180 Tab, Rfl: 0    albuterol-ipratropium (DUO-NEB) 2.5 mg-0.5 mg/3 ml nebu, 3 mL by Nebulization route every six (6) hours as needed. Use 1 vial t.i.d. Via nebulizer prn, Disp: 360 Nebule, Rfl: 1    desloratadine (CLARINEX) 5 mg tablet, Take 1 Tab by mouth daily. , Disp: 30 Tab, Rfl: 0    clonazePAM (KLONOPIN) 0.5 mg tablet, Take 1 Tab by mouth two (2) times a day. Max Daily Amount: 1 mg., Disp: 30 Tab, Rfl: 0    cholecalciferol (VITAMIN D3) 1,000 unit cap, Take  by mouth two (2) times a day., Disp: , Rfl:     budesonide (PULMICORT) 0.5 mg/2 mL nbsp, 2 mL by Nebulization route two (2) times a day. (Patient taking differently: 500 mcg by Nebulization route two (2) times daily as needed.), Disp: 60 Each, Rfl: 3    levothyroxine (SYNTHROID) 100 mcg tablet, Take 1 Tab by mouth Daily (before breakfast). , Disp: 90 Tab, Rfl: 3    Calcium Carbonate-Vit D3-Min (CALTRATE 600+D PLUS MINERALS) 600 mg calcium- 400 unit Tab, Take  by mouth daily. , Disp: , Rfl:     multivitamins-minerals-lutein (CENTRUM SILVER) Tab, Take  by mouth daily. , Disp: , Rfl:   Visit Vitals    BP (!) 108/32 (BP 1 Location: Right arm, BP Patient Position: Sitting)    Temp 97.5 °F (36.4 °C) (Oral)    Resp 16    Ht 5' 4\" (1.626 m)    Wt 108 lb (49 kg)    BMI 18.54 kg/m2         Physical Exam   Constitutional: She appears well-developed and well-nourished. No distress. Cardiovascular: Normal rate, regular rhythm and intact distal pulses. Exam reveals no gallop and no friction rub. Murmur heard. Pulmonary/Chest: Effort normal and breath sounds normal. No respiratory distress. She has no wheezes. She has no rales. She exhibits no tenderness. ronchi x 2   Skin: Skin is warm and dry. No rash noted. She is not diaphoretic. No erythema. There is pallor.    Vitals reviewed.  hgb 2.2    ASSESSMENT and PLAN  critical anemia suspicious for blood loss   plan  contact 911

## 2018-02-14 NOTE — MR AVS SNAPSHOT
[address]  [address] [facility] [address] [phone] Patient: María Duvall MRN: [id number] SORIN:[birth year] Visit Information Date & Time Provider Department Dept. Phone Encounter #  
 2/14/2018  4:15 PM Herman Ceron MD [address] [place] [address]  Follow-up Instructions Return if symptoms worsen or fail to improve. Follow-up and Disposition History Upcoming Health Maintenance Date Due Hepatitis C Screening 1945 COLONOSCOPY 5/2/1963 DTaP/Tdap/Td series (1 - Tdap) 5/2/1966 ZOSTER VACCINE AGE 60> 3/2/2005 MEDICARE YEARLY EXAM 6/24/2016 BREAST CANCER SCRN MAMMOGRAM 6/13/2019 GLAUCOMA SCREENING Q2Y 10/30/2019 Allergies as of 2/14/2018  Review Complete On: 2/14/2018 By: Herman Ceron MD  
  
 Severity Noted Reaction Type Reactions Amoxicillin  08/23/2010    Unknown (comments) Avelox [Moxifloxacin]  08/23/2010    Unknown (comments) Celexa [Citalopram]  08/23/2010    Unknown (comments) Ciprofloxacin  08/23/2010    Unknown (comments) Doxycycline  01/17/2017    Nausea and Vomiting Levaquin [Levofloxacin]  08/23/2010    Unknown (comments) Paxil [Paroxetine Hcl]  08/23/2010    Unknown (comments) Sulfa (Sulfonamide Antibiotics)  08/23/2010    Unknown (comments) Joints ache Tequin [Gatifloxacin]  08/23/2010    Unknown (comments) Current Immunizations  Reviewed on 10/2/2017 Name Date Influenza High Dose Vaccine PF 10/2/2017 11:24 AM, 10/31/2016  2:06 PM, 10/20/2015 Influenza Vaccine 10/22/2013 Influenza Vaccine (Quad) 11/24/2014 12:17 PM  
 Influenza Vaccine Split 11/16/2011 Not reviewed this visit You Were Diagnosed With   
  
 Codes Comments Anemia, unspecified type    -  Primary ICD-10-CM: D64.9 ICD-9-CM: 857. 9 Chronic obstructive pulmonary disease, unspecified COPD type ([facility]     ICD-10-CM: J44.9 ICD-9-CM: 489 Vitals BP Temp Resp Height(growth percentile) Weight(growth percentile) BMI  
 (!) 108/32 (BP 1 Location: Right arm, BP Patient Position: Sitting) 97.5 °F (36.4 °C) (Oral) 16 5' 4\" (1.626 m) 108 lb (49 kg) 18.54 kg/m2 OB Status Smoking Status Postmenopausal Never Smoker BMI and BSA Data Body Mass Index Body Surface Area 18.54 kg/m 2 1.49 m 2 Preferred Pharmacy Pharmacy Name Phone [facility] [address], [address] [phone] Your Updated Medication List  
  
   
This list is accurate as of: 2/14/18  4:22 PM.  Always use your most recent med list.  
  
  
  
  
 acetylcysteine 100 mg/mL (10 %) nebulizer solution Commonly known as:  MUCOMYST Take 4 mL by inhalation two (2) times a day. albuterol-ipratropium 2.5 mg-0.5 mg/3 ml Nebu Commonly known as:  DUO-NEB  
3 mL by Nebulization route every six (6) hours as needed. Use 1 vial t.i.d. Via nebulizer prn * budesonide 0.5 mg/2 mL Nbsp Commonly known as:  PULMICORT  
2 mL by Nebulization route two (2) times a day. * budesonide 1 mg/2 mL Nbsp Commonly known as:  PULMICORT  
2 mL by Nebulization route two (2) times a day. CALTRATE 600+D PLUS MINERALS 600 mg calcium- 400 unit Tab Generic drug:  Calcium Carbonate-Vit D3-Min Take  by mouth daily. CENTRUM SILVER Tab tablet Generic drug:  multivitamins-minerals-lutein Take  by mouth daily. chlorpheniramine-HYDROcodone 10-8 mg/5 mL suspension Commonly known as:  Lonita Chamberino Take 5 mL by mouth every twelve (12) hours as needed for Cough. Max Daily Amount: 10 mL. clonazePAM 0.5 mg tablet Commonly known as:  Nichole Mutters Take 1 Tab by mouth two (2) times a day. Max Daily Amount: 1 mg.  
  
 desloratadine 5 mg tablet Commonly known as:  CLARINEX Take 1 Tab by mouth daily. * HYDROcodone-acetaminophen  mg tablet Commonly known as:  Mary Chain Take 1 Tab by mouth three (3) times daily as needed for Pain. * HYDROcodone-acetaminophen  mg tablet Commonly known as:  Mary Chain Take 1 Tab by mouth three (3) times daily. Max Daily Amount: 3 Tabs. levothyroxine 100 mcg tablet Commonly known as:  SYNTHROID Take 1 Tab by mouth Daily (before breakfast). metoprolol tartrate 25 mg tablet Commonly known as:  LOPRESSOR  
TAKE 1 TABLET BY MOUTH TWICE DAILY pilocarpine 5 mg tablet Commonly known as:  SALAGEN (PILOCARPINE) Take 1 Tab by mouth two (2) times a day. VITAMIN D3 1,000 unit Cap Generic drug:  cholecalciferol Take  by mouth two (2) times a day. zileuton  mg tablet Commonly known as:  ZYFLO CR Take 1 Tab by mouth Before breakfast and dinner. ZONALON, PRUDOXIN 5 % topical cream  
Commonly known as:  doxepin (ZONALON, PRUDOXIN) 5% cream  
Apply  to affected area three (3) times daily. * Notice: This list has 4 medication(s) that are the same as other medications prescribed for you. Read the directions carefully, and ask your doctor or other care provider to review them with you. We Performed the Following AMB POC HEMOGLOBIN (HGB) [49908 CPT(R)] Follow-up Instructions Return if symptoms worsen or fail to improve. Introducing \[facility] [place] [facility]\"" & [facility] SERVICES! Dear She Sloan: Thank you for requesting a [employer] account. Our records indicate that you already have an active [employer] account. You can access your account anytime at [email or web address] Did you know that you can access your hospital and ER discharge instructions at any time in [employer]? You can also review all of your test results from your hospital stay or ER visit. Additional Information If you have questions, please visit the Frequently Asked Questions section of the [employer] website at [email or web address] Remember, [facility] is NOT to be used for urgent needs. For medical emergencies, dial 911. Now available from your iPhone and Android! Please provide this summary of care documentation to your next provider. Your primary care clinician is listed as Farrukh Brand. If you have any questions after today's visit, please call [phone].

## 2018-02-14 NOTE — PROGRESS NOTES
Leonardo Arrington is a 67 y.o. female (: 1945) presenting to address:    Chief Complaint   Patient presents with    Cough    Follow-up     pain scale 0/10       Vitals:    18 1555   BP: (!) 108/32   Resp: 16   Temp: 97.5 °F (36.4 °C)   TempSrc: Oral   Weight: 108 lb (49 kg)   Height: 5' 4\" (1.626 m)   PainSc:   0 - No pain       Hearing/Vision:   No exam data present    Learning Assessment:     Learning Assessment 2014   PRIMARY LEARNER Patient   HIGHEST LEVEL OF EDUCATION - PRIMARY LEARNER  GRADUATED HIGH SCHOOL OR GED   BARRIERS PRIMARY LEARNER NONE   CO-LEARNER CAREGIVER No   PRIMARY LANGUAGE ENGLISH   LEARNER PREFERENCE PRIMARY OTHER (COMMENT)   ANSWERED BY Patient   RELATIONSHIP SELF     Depression Screening:     PHQ over the last two weeks 3/4/2014   Little interest or pleasure in doing things Nearly every day   Feeling down, depressed or hopeless Nearly every day   Total Score PHQ 2 6   Trouble falling or staying asleep, or sleeping too much Nearly every day   Feeling tired or having little energy Not at all   Poor appetite or overeating Several days   Feeling bad about yourself - or that you are a failure or have let yourself or your family down Nearly every day   Trouble concentrating on things such as school, work, reading or watching TV Nearly every day   Moving or speaking so slowly that other people could have noticed; or the opposite being so fidgety that others notice Nearly every day   Thoughts of being better off dead, or hurting yourself in some way Nearly every day   How difficult have these problems made it for you to do your work, take care of your home and get along with others Extremely difficult     Fall Risk Assessment:     Fall Risk Assessment, last 12 mths 2018   Able to walk? Yes   Fall in past 12 months? No   Fall with injury? -   Number of falls in past 12 months -   Fall Risk Score -     Abuse Screening:   No flowsheet data found.   Coordination of Care Questionaire:   1. Have you been to the ER, urgent care clinic since your last visit? Hospitalized since your last visit? NO    2. Have you seen or consulted any other health care providers outside of the [address] [place] [address] since your last visit? Include any pap smears or colon screening. YES Dr. Daquan Sierra    Advanced Directive:   1. Do you have an Advanced Directive? YES    2. Would you like information on Advanced Directives?  NO

## 2018-03-01 ENCOUNTER — TELEPHONE (OUTPATIENT)
Dept: FAMILY MEDICINE CLINIC | Age: 73
End: 2018-03-01

## 2018-03-01 DIAGNOSIS — J45.991 COUGH VARIANT ASTHMA: ICD-10-CM

## 2018-03-01 NOTE — TELEPHONE ENCOUNTER
Patients  is calling again. The patient has checked herself out of the nursing home against medical advice. She did not want to stay for 14 days. He is looking for a referral to home health. She has a PIC line and no IV meds to use. She needs IV meds 3 times a day.   He is taking her back to [facility] in an effort to obtain the IV meds and a referral for home health services

## 2018-03-01 NOTE — TELEPHONE ENCOUNTER
Patient was discharged from nursing care center against medical advise. Patient signed herself out.  states she has pneumonia and needs IV antibiotics and also has TTP. States he is going to go back to the emergency room with her, but would like to speak to Dr. Bertram Patel because they don't have any way to get her care without home healthcare.   Please review and assist.

## 2018-03-05 ENCOUNTER — PATIENT OUTREACH (OUTPATIENT)
Dept: FAMILY MEDICINE CLINIC | Age: 73
End: 2018-03-05

## 2018-03-05 PROBLEM — D64.9 ANEMIA: Status: ACTIVE | Noted: 2018-02-14

## 2018-03-05 PROBLEM — K92.2 GASTROINTESTINAL HEMORRHAGE: Status: ACTIVE | Noted: 2018-02-14

## 2018-03-05 RX ORDER — NIFEDIPINE 30 MG/1
30 TABLET, EXTENDED RELEASE ORAL DAILY
COMMUNITY
Start: 2018-02-28 | End: 2018-03-28 | Stop reason: SDUPTHER

## 2018-03-05 RX ORDER — PREDNISONE 10 MG/1
10 TABLET ORAL AS DIRECTED
COMMUNITY
Start: 2018-03-02 | End: 2018-04-10 | Stop reason: ALTCHOICE

## 2018-03-05 RX ORDER — LATANOPROST 50 UG/ML
1 SOLUTION/ DROPS OPHTHALMIC
COMMUNITY
Start: 2015-08-07

## 2018-03-05 RX ORDER — TRAZODONE HYDROCHLORIDE 50 MG/1
50 TABLET ORAL
COMMUNITY
Start: 2018-01-25

## 2018-03-05 RX ORDER — CEFEPIME HYDROCHLORIDE 1 G/1
2 INJECTION, POWDER, FOR SOLUTION INTRAMUSCULAR; INTRAVENOUS EVERY 8 HOURS
COMMUNITY
Start: 2018-02-28 | End: 2018-03-14

## 2018-03-05 RX ORDER — CYANOCOBALAMIN 1000 UG/ML
1000 INJECTION, SOLUTION INTRAMUSCULAR; SUBCUTANEOUS
COMMUNITY
Start: 2018-03-05 | End: 2018-03-07 | Stop reason: SDUPTHER

## 2018-03-05 RX ORDER — HYDROXYZINE 50 MG/1
50 TABLET, FILM COATED ORAL
COMMUNITY
Start: 2018-01-25 | End: 2018-05-09 | Stop reason: SDUPTHER

## 2018-03-05 RX ORDER — HYDROCODONE BITARTRATE AND ACETAMINOPHEN 10; 325 MG/1; MG/1
1 TABLET ORAL
COMMUNITY
Start: 2018-02-28

## 2018-03-05 RX ORDER — BENZONATATE 100 MG/1
100 CAPSULE ORAL
COMMUNITY
Start: 2018-02-28 | End: 2018-04-10 | Stop reason: SDUPTHER

## 2018-03-05 RX ORDER — GUAIFENESIN 100 MG/5ML
100 SOLUTION ORAL
COMMUNITY
Start: 2018-02-28 | End: 2018-08-17

## 2018-03-05 RX ORDER — OLANZAPINE 5 MG/1
5 TABLET ORAL
COMMUNITY
Start: 2018-01-25

## 2018-03-05 NOTE — PROGRESS NOTES
.  Transitional Care Nurse Navigator Note:  Hospital Follow Up for [facility] Admission from 03/1 - 02/2018 for Pneumonia. RRAT score: 15 Moderate  Medical History:     Past Medical History:   Diagnosis Date    Bronchiolitis     Cough     GERD (gastroesophageal reflux disease)     went for surgery for it    Hypertension     JRA (juvenile rheumatoid arthritis) ([facility] [address]        Patient presenting symptoms Cough,wheezing SOB. Diagnosed with Bilat PNA, pseudomonas and klebsiella  . Admitted to Hospitalist Service with consults from ID. Consults recommended ceFEPIme (MAXIPIME) Inj SOLR Inject 2 g in vein Every 8 Hours for 14 days. Course of current Hospitalization (referenced by Roddy Burden MD - 03/02/2018 11:25 AM   ote): History of Present Illness   Chief Complaint: tired  Kaleb Dill is a 67 y.o. female who is s/p hospitalization for complicated pneumonia with ID consult suggested two weeks of cefepime Q8 hours for which patient was discharged to SNF. Patient signed self out of SNF facility yesterday due to inconsistency in medication administration as per . Patient has received none of her medications yet today. Patient and  interested in potentially administering her antibiotics at home through PICC line themselves as he was successful in performing those duties with vancomycin last year. Patient informed that home health can only perform BID infusions/visits to home. He feels confident in administering third dose if not all doses. Patient has not had any change in her status since leaving SNF. No acute complaints.      History by  due to chronic psychiatric disease in wife.  and wife want to go home with [facility]. Patient has [facility] coming in today. Medication Reconciliation completed:YES    Barriers to care?   None at this time    Support System consists of:     This represents Transitions of Care because NN spoke with patient and/or caregiver within 1 business days of discharge. Pt's TCM follow up appt is scheduled with Dr. Kandis Callahan on Wednesday 3/7/18 @ 11:30  which is within 5 days of discharge. Called patient on 3/5/2018, but  answered the phone he verified with patient's  Identifiers with her  and full Name. History by  due to chronic psychiatric disease in wife. He is on her [facility] [place] [id number] emergency list.    went over patient's admission to discharge to calling PCP office as well. NN listened to him as he is the care giver. NN has noticed patient missed several appointments with PCP including no shows and rescheduling.  said that he needs to be active with their appointment follow ups.  state's that he has given patient IV VANCO last year when she was dx with MRSA. She did receive 3 doses at the hospital ,laura like the SNF she was their over 20 hours and no IV antibiotics. That is why she signed out of there and when I could not get in touched with Dr Kandis Callahan for [facility] orders I took her back to the Emergency room. FLORIN follow appointment he asked wife would it be okay to be seen on Weds 3/7/2018 and she did say yes. [facility] nurse is coming today to make sure that he knows how to do the IV antibiotic  Medications. NN discussed the times for the cefepime, 2 g,.  said the he gives it at 6 am ,2 pm and 10 pm  As instructed for every 8 hours and what they did in the hospital . He did not leave her side.  verbalizing about patient's low blood count and why she was sent to the ED from her last office visit on 2018 for HGB of 2.2. She is seeing Dr Vanessa Mcarthur Hematology for this which he is treating patient for TTP but still not sure if it's that or not . NN allowed  to go on with information. .  Goals        Post Hospitalization     Attends follow-up appointments as directed. Patient will keep PCP follow up.          Knowledge and adherence of prescribed medication (ie. action, side effects, missed dose, etc.).  will administer cefepime, 2 g, IV every 8 hours via Picc line as instructed for 14 days. He will be able to verbalize patient line and flushes, S&S of infection at site  as instructed via Teach Back         Understands red flags post discharge.  will monitor PICC line site for S&S of infection, as noted by Redness at site ,redness streaks down or up arm, fever >101.5 with chills, PICC line site Hot to touch ,sluggish IV fluids.  Via teach back

## 2018-03-05 NOTE — Clinical Note
Patient is coming in for TRUMAN SALGUERO 3/7 @ 11:30 .  is giving her cefepime, 2 g, every 8 hours via PICC  For 14 days. [place] nurse coming by today. She is seeing Dr Rod Handing for TTP ?  has questions about her medications did not want to go into details with me but I did most of them corrected and reconciled.

## 2018-03-06 RX ORDER — HYDROCODONE POLISTIREX AND CHLORPHENIRAMINE POLISTIREX 10; 8 MG/5ML; MG/5ML
5 SUSPENSION, EXTENDED RELEASE ORAL
Qty: 200 ML | Refills: 0 | OUTPATIENT
Start: 2018-03-06

## 2018-03-06 NOTE — TELEPHONE ENCOUNTER
She's been through a lot recently, can I get some clinical data; fever, chest pain, productive cough, dyspnea

## 2018-03-07 ENCOUNTER — OFFICE VISIT (OUTPATIENT)
Dept: FAMILY MEDICINE CLINIC | Age: 73
End: 2018-03-07

## 2018-03-07 VITALS
DIASTOLIC BLOOD PRESSURE: 63 MMHG | TEMPERATURE: 98.2 F | OXYGEN SATURATION: 100 % | HEART RATE: 92 BPM | SYSTOLIC BLOOD PRESSURE: 124 MMHG | HEIGHT: 64 IN | RESPIRATION RATE: 18 BRPM

## 2018-03-07 DIAGNOSIS — D51.8 OTHER VITAMIN B12 DEFICIENCY ANEMIA: ICD-10-CM

## 2018-03-07 DIAGNOSIS — Z86.2 HISTORY OF TTP (THROMBOTIC THROMBOCYTOPENIC PURPURA): Primary | ICD-10-CM

## 2018-03-07 DIAGNOSIS — R05.3 CHRONIC COUGH: ICD-10-CM

## 2018-03-07 DIAGNOSIS — E53.8 B12 DEFICIENCY: ICD-10-CM

## 2018-03-07 DIAGNOSIS — D51.0 PERNICIOUS ANEMIA: ICD-10-CM

## 2018-03-07 PROBLEM — Z87.39 HISTORY OF OSTEOMYELITIS: Status: ACTIVE | Noted: 2018-03-07

## 2018-03-07 PROBLEM — Z86.718 HISTORY OF DVT (DEEP VEIN THROMBOSIS): Status: ACTIVE | Noted: 2018-03-07

## 2018-03-07 RX ORDER — HYDROCODONE POLISTIREX AND CHLORPHENIRAMINE POLISTIREX 10; 8 MG/5ML; MG/5ML
5 SUSPENSION, EXTENDED RELEASE ORAL
Qty: 120 ML | Refills: 0 | Status: SHIPPED | OUTPATIENT
Start: 2018-03-07 | End: 2018-04-10 | Stop reason: SDUPTHER

## 2018-03-07 RX ORDER — CYANOCOBALAMIN 1000 UG/ML
1000 INJECTION, SOLUTION INTRAMUSCULAR; SUBCUTANEOUS ONCE
Qty: 1 ML | Refills: 0
Start: 2018-03-07 | End: 2018-03-07

## 2018-03-07 RX ORDER — CYANOCOBALAMIN 1000 UG/ML
1000 INJECTION, SOLUTION INTRAMUSCULAR; SUBCUTANEOUS
Qty: 4 VIAL | Refills: 3 | Status: SHIPPED | OUTPATIENT
Start: 2018-03-07 | End: 2018-04-10 | Stop reason: ALTCHOICE

## 2018-03-07 NOTE — MR AVS SNAPSHOT
Radha Pulse 
 
 
 [address] [facility] [address] 
[phone] Patient: Deepthi Karimi MRN: [id number] UPD:3/3/2707 Visit Information Date & Time Provider Department Dept. Phone Encounter #  
 3/7/2018 11:30 AM Adan Bermudez, Yamilka [facility] [phone] [id number] Follow-up Instructions Return in about 1 month (around 4/7/2018). Upcoming Health Maintenance Date Due Hepatitis C Screening 1945 COLONOSCOPY 5/2/1963 ZOSTER VACCINE AGE 60> 3/2/2005 MEDICARE YEARLY EXAM 6/24/2016 BREAST CANCER SCRN MAMMOGRAM 6/13/2019 GLAUCOMA SCREENING Q2Y 10/30/2019 DTaP/Tdap/Td series (2 - Td) 3/4/2020 Allergies as of 3/7/2018  Review Complete On: 3/7/2018 By: Adan Bermudez MD  
  
 Severity Noted Reaction Type Reactions Amoxicillin  08/23/2010    Unknown (comments) Avelox [Moxifloxacin]  08/23/2010    Unknown (comments) Celexa [Citalopram]  08/23/2010    Unknown (comments) Ciprofloxacin  08/23/2010    Unknown (comments) Doxycycline  01/17/2017    Nausea and Vomiting Levaquin [Levofloxacin]  08/23/2010    Unknown (comments) Paxil [Paroxetine Hcl]  08/23/2010    Unknown (comments) Sulfa (Sulfonamide Antibiotics)  08/23/2010    Unknown (comments) Joints ache Tequin [Gatifloxacin]  08/23/2010    Unknown (comments) Current Immunizations  Reviewed on 10/2/2017 Name Date Influenza High Dose Vaccine PF 10/2/2017 11:24 AM, 10/31/2016  2:06 PM, 10/20/2015 Influenza Vaccine 10/2/2017 12:00 AM, 10/22/2013, 9/1/2009 12:00 AM  
 Influenza Vaccine (Quad) 11/24/2014 12:17 PM  
 Influenza Vaccine Split 11/16/2011 Pneumococcal Polysaccharide (PPSV-23) 8/1/2013 12:00 AM  
 Tdap 3/4/2010 12:00 AM  
  
 Not reviewed this visit You Were Diagnosed With   
  
 Codes Comments History of TTP (thrombotic thrombocytopenic purpura)    -  Primary ICD-10-CM: S82.6 ICD-9-CM: V12.3 B12 deficiency     ICD-10-CM: E53.8 ICD-9-CM: 266.2 Other vitamin B12 deficiency anemia     ICD-10-CM: D51.8 ICD-9-CM: 794. 1 Chronic cough     ICD-10-CM: R05 ICD-9-CM: 786.2 Pernicious anemia     ICD-10-CM: D51.0 ICD-9-CM: 281.0 Vitals BP Pulse Temp Resp Height(growth percentile) SpO2  
 124/63 (BP 1 Location: Right arm, BP Patient Position: Sitting) 92 98.2 °F (36.8 °C) (Oral) 18 5' 4\" (1.626 m) 100% OB Status Smoking Status Postmenopausal Never Smoker Preferred Pharmacy Pharmacy Name Phone [facility] [address], [address] [address] [phone] Your Updated Medication List  
  
   
This list is accurate as of 3/7/18 12:12 PM.  Always use your most recent med list.  
  
  
  
  
 acetylcysteine 100 mg/mL (10 %) nebulizer solution Commonly known as:  MUCOMYST Take 4 mL by inhalation two (2) times a day. albuterol-ipratropium 2.5 mg-0.5 mg/3 ml Nebu Commonly known as:  DUO-NEB  
3 mL by Nebulization route every six (6) hours as needed. Use 1 vial t.i.d. Via nebulizer prn  
  
 benzonatate 100 mg capsule Commonly known as:  TESSALON Take 100 mg by mouth every eight (8) hours as needed. * budesonide 0.5 mg/2 mL Nbsp Commonly known as:  PULMICORT  
2 mL by Nebulization route two (2) times a day. * budesonide 1 mg/2 mL Nbsp Commonly known as:  PULMICORT  
2 mL by Nebulization route two (2) times a day. CALTRATE 600+D PLUS MINERALS 600 mg calcium- 400 unit Tab Generic drug:  Calcium Carbonate-Vit D3-Min Take  by mouth daily. cefepime 1 gram injection Commonly known as:  MAXIPIME  
2 g by IntraVENous route every eight (8) hours. 6 AM ,2 PM & 10 PM x 14 days CENTRUM SILVER Tab tablet Generic drug:  multivitamins-minerals-lutein Take  by mouth daily. * chlorpheniramine-HYDROcodone 10-8 mg/5 mL suspension Commonly known as:  Emerald Rickers Take 5 mL by mouth every twelve (12) hours as needed for Cough. Max Daily Amount: 10 mL. * chlorpheniramine-HYDROcodone 10-8 mg/5 mL suspension Commonly known as:  Emerald Rickers Take 5 mL by mouth every twelve (12) hours as needed for Cough. Max Daily Amount: 10 mL. clonazePAM 0.5 mg tablet Commonly known as:  Lendel Jenise Take 1 Tab by mouth two (2) times a day. Max Daily Amount: 1 mg.  
  
 * cyanocobalamin 1,000 mcg/mL injection Commonly known as:  VITAMIN B12  
1,000 mcg by IntraMUSCular route every seven (7) days. * cyanocobalamin 1,000 mcg/mL injection Commonly known as:  VITAMIN B-12  
1 mL by IntraMUSCular route once for 1 dose. desloratadine 5 mg tablet Commonly known as:  CLARINEX Take 1 Tab by mouth daily. * guaiFENesin 100 mg/5 mL liquid Commonly known as:  ROBITUSSIN Take 100 mg by mouth every six (6) hours as needed. * guaiFENesin 1,200 mg Ta12 ER tablet Commonly known as:  Deni & Deni Take 1 Tab by mouth two (2) times a day. HYDROcodone-acetaminophen  mg tablet Commonly known as:  Nafisa Pont Take 1 Tab by mouth every eight (8) hours as needed. hydrOXYzine HCl 50 mg tablet Commonly known as:  ATARAX Take 50 mg by mouth every eight (8) hours as needed. Anxiety  
  
 latanoprost 0.005 % ophthalmic solution Commonly known as:  [place]ongslime Solis Administer 1 Drop to both eyes nightly. levothyroxine 100 mcg tablet Commonly known as:  SYNTHROID Take 1 Tab by mouth Daily (before breakfast). metoprolol tartrate 25 mg tablet Commonly known as:  LOPRESSOR  
TAKE 1 TABLET BY MOUTH TWICE DAILY  
  
 NIFEdipine ER 30 mg ER tablet Commonly known as:  PROCARDIA XL Take 30 mg by mouth daily. OLANZapine 5 mg tablet Commonly known as:  ZyPREXA Take 5 mg by mouth nightly. pilocarpine 5 mg tablet Commonly known as:  SALAGEN (PILOCARPINE) Take 1 Tab by mouth two (2) times a day. predniSONE 10 mg tablet Commonly known as:  Wes Screws Take 10 mg by mouth as directed. traZODone 50 mg tablet Commonly known as:  [place] Take 50 mg by mouth nightly. VITAMIN D3 1,000 unit Cap Generic drug:  cholecalciferol Take  by mouth two (2) times a day. zileuton  mg tablet Commonly known as:  ZYFLO CR Take 1 Tab by mouth Before breakfast and dinner. ZONALON, PRUDOXIN 5 % topical cream  
Commonly known as:  doxepin (ZONALON, PRUDOXIN) 5% cream  
Apply  to affected area three (3) times daily. * Notice: This list has 8 medication(s) that are the same as other medications prescribed for you. Read the directions carefully, and ask your doctor or other care provider to review them with you. Prescriptions Printed Refills  
 chlorpheniramine-HYDROcodone (TUSSIONEX) 10-8 mg/5 mL suspension 0 Sig: Take 5 mL by mouth every twelve (12) hours as needed for Cough. Max Daily Amount: 10 mL. Class: Print Route: Oral  
  
We Performed the Following THER/PROPH/DIAG INJECTION, SUBCUT/IM [id number] CPT(R)] VITAMIN B12 INJECTION [ [facility]] Follow-up Instructions Return in about 1 month (around 4/7/2018). Introducing [facility] & HEALTH SERVICES! Dear Octavia Grover: Thank you for requesting a [employer] account. Our records indicate that you already have an active [employer] account. You can access your account anytime at [email or web address] Did you know that you can access your hospital and ER discharge instructions at any time in [employer]? You can also review all of your test results from your hospital stay or ER visit. Additional Information If you have questions, please visit the Frequently Asked Questions section of the [employer] website at [email or web address] Remember, [employer] is NOT to be used for urgent needs. For medical emergencies, dial 911. Now available from your iPhone and Android! Please provide this summary of care documentation to your next provider. Your primary care clinician is listed as Gino Mejia. If you have any questions after today's visit, please call [phone].

## 2018-03-07 NOTE — PROGRESS NOTES
Moses Zhang is a 67 y.o. female (: 1945) presenting to address:    Chief Complaint   Patient presents with   [facility] Follow Up    Tongue Swelling     pain scale 0/10       Vitals:    18 1131   BP: 124/63   Pulse: 92   Resp: 18   Temp: 98.2 °F (36.8 °C)   TempSrc: Oral   SpO2: 100%   Height: 5' 4\" (1.626 m)   PainSc:   0 - No pain       Hearing/Vision:   No exam data present    Learning Assessment:     Learning Assessment 2014   PRIMARY LEARNER Patient   HIGHEST LEVEL OF EDUCATION - PRIMARY LEARNER  GRADUATED HIGH SCHOOL OR GED   BARRIERS PRIMARY LEARNER NONE   CO-LEARNER CAREGIVER No   PRIMARY LANGUAGE ENGLISH   LEARNER PREFERENCE PRIMARY OTHER (COMMENT)   ANSWERED BY Patient   RELATIONSHIP SELF     Depression Screening:     PHQ over the last two weeks 3/4/2014   Little interest or pleasure in doing things Nearly every day   Feeling down, depressed or hopeless Nearly every day   Total Score PHQ 2 6   Trouble falling or staying asleep, or sleeping too much Nearly every day   Feeling tired or having little energy Not at all   Poor appetite or overeating Several days   Feeling bad about yourself - or that you are a failure or have let yourself or your family down Nearly every day   Trouble concentrating on things such as school, work, reading or watching TV Nearly every day   Moving or speaking so slowly that other people could have noticed; or the opposite being so fidgety that others notice Nearly every day   Thoughts of being better off dead, or hurting yourself in some way Nearly every day   How difficult have these problems made it for you to do your work, take care of your home and get along with others Extremely difficult     Fall Risk Assessment:     Fall Risk Assessment, last 12 mths 2018   Able to walk? Yes   Fall in past 12 months? No   Fall with injury? -   Number of falls in past 12 months -   Fall Risk Score -     Abuse Screening:   No flowsheet data found.   Coordination of Care Questionaire:   1. Have you been to the ER, urgent care clinic since your last visit? Hospitalized since your last visit? YES    2. Have you seen or consulted any other health care providers outside of the [address] [place] [address] since your last visit? Include any pap smears or colon screening. NO    Advanced Directive:   1. Do you have an Advanced Directive? YES    2. Would you like information on Advanced Directives?  NO

## 2018-03-07 NOTE — PROGRESS NOTES
HISTORY OF PRESENT ILLNESS  Beena Cleary is a 67 y.o. female. HPI  [facility] visit  Admitted [facility] severe anemia, 02/14/18  D/c suspect TTP, severe pernicious anemia, 02/28/18  Contacted NN 03/05/18  Currently has persistent cough  Not a/w dyspnea  C/o ear fullness  Osteomyelitis resolved  dvt leg resolved  Orbital injury, fracture ruled out  Respiratory failure stabilized  Review of Systems   Constitutional: Positive for malaise/fatigue. HENT: Positive for ear pain. Respiratory: Positive for cough. All other systems reviewed and are negative. Past Medical History:   Diagnosis Date    Bronchiolitis     Cough     GERD (gastroesophageal reflux disease)     went for surgery for it    Hypertension     JRA (juvenile rheumatoid arthritis) ([facility] [address]        Current Outpatient Prescriptions:     benzonatate (TESSALON) 100 mg capsule, Take 100 mg by mouth every eight (8) hours as needed. , Disp: , Rfl:     cefepime (MAXIPIME) 1 gram injection, 2 g by IntraVENous route every eight (8) hours. 6 AM ,2 PM & 10 PM x 14 days, Disp: , Rfl:     cyanocobalamin (VITAMIN B12) 1,000 mcg/mL injection, 1,000 mcg by IntraMUSCular route every seven (7) days. , Disp: , Rfl:     guaiFENesin (ROBITUSSIN) 100 mg/5 mL liquid, Take 100 mg by mouth every six (6) hours as needed. , Disp: , Rfl:     hydrOXYzine HCl (ATARAX) 50 mg tablet, Take 50 mg by mouth every eight (8) hours as needed. Anxiety, Disp: , Rfl:     latanoprost (XALATAN) 0.005 % ophthalmic solution, Administer 1 Drop to both eyes nightly., Disp: , Rfl:     NIFEdipine ER (PROCARDIA XL) 30 mg ER tablet, Take 30 mg by mouth daily. , Disp: , Rfl:     OLANZapine (ZYPREXA) 5 mg tablet, Take 5 mg by mouth nightly., Disp: , Rfl:     predniSONE (DELTASONE) 10 mg tablet, Take 10 mg by mouth as directed., Disp: , Rfl:     traZODone (DESYREL) 50 mg tablet, Take 50 mg by mouth nightly., Disp: , Rfl:     HYDROcodone-acetaminophen (NORCO)  mg tablet, Take 1 Tab by mouth every eight (8) hours as needed. , Disp: , Rfl:     zileuton ER (ZYFLO CR) 600 mg tablet, Take 1 Tab by mouth Before breakfast and dinner., Disp: 60 Tab, Rfl: 1    budesonide (PULMICORT) 1 mg/2 mL nbsp, 2 mL by Nebulization route two (2) times a day., Disp: 60 Each, Rfl: 1    chlorpheniramine-HYDROcodone (TUSSIONEX) 10-8 mg/5 mL suspension, Take 5 mL by mouth every twelve (12) hours as needed for Cough. Max Daily Amount: 10 mL., Disp: 200 mL, Rfl: 0    pilocarpine (SALAGEN, PILOCARPINE,) 5 mg tablet, Take 1 Tab by mouth two (2) times a day., Disp: 60 Tab, Rfl: 0    acetylcysteine (MUCOMYST) 100 mg/mL (10 %) nebulizer solution, Take 4 mL by inhalation two (2) times a day., Disp: 120 mL, Rfl: 0    ZONALON, PRUDOXIN (DOXEPIN, ZONALON, PRUDOXIN, 5% CREAM) 5 % topical cream, Apply  to affected area three (3) times daily. , Disp: 30 g, Rfl: 0    metoprolol tartrate (LOPRESSOR) 25 mg tablet, TAKE 1 TABLET BY MOUTH TWICE DAILY, Disp: 180 Tab, Rfl: 0    albuterol-ipratropium (DUO-NEB) 2.5 mg-0.5 mg/3 ml nebu, 3 mL by Nebulization route every six (6) hours as needed. Use 1 vial t.i.d. Via nebulizer prn, Disp: 360 Nebule, Rfl: 1    desloratadine (CLARINEX) 5 mg tablet, Take 1 Tab by mouth daily. , Disp: 30 Tab, Rfl: 0    clonazePAM (KLONOPIN) 0.5 mg tablet, Take 1 Tab by mouth two (2) times a day. Max Daily Amount: 1 mg., Disp: 30 Tab, Rfl: 0    cholecalciferol (VITAMIN D3) 1,000 unit cap, Take  by mouth two (2) times a day., Disp: , Rfl:     budesonide (PULMICORT) 0.5 mg/2 mL nbsp, 2 mL by Nebulization route two (2) times a day. (Patient taking differently: 500 mcg by Nebulization route two (2) times daily as needed.), Disp: 60 Each, Rfl: 3    levothyroxine (SYNTHROID) 100 mcg tablet, Take 1 Tab by mouth Daily (before breakfast). , Disp: 90 Tab, Rfl: 3    Calcium Carbonate-Vit D3-Min (CALTRATE 600+D PLUS MINERALS) 600 mg calcium- 400 unit Tab, Take  by mouth daily. , Disp: , Rfl:     multivitamins-minerals-lutein (CENTRUM SILVER) Tab, Take  by mouth daily. , Disp: , Rfl:   Visit Vitals    /63 (BP 1 Location: Right arm, BP Patient Position: Sitting)    Pulse 92    Temp 98.2 °F (36.8 °C) (Oral)    Resp 18    Ht 5' 4\" (1.626 m)    SpO2 100%         Physical Exam   Constitutional: She is oriented to person, place, and time. She appears well-developed and well-nourished. No distress. HENT:   Head: Normocephalic and atraumatic. Right Ear: External ear normal.   Mouth/Throat: No oropharyngeal exudate. Tm congested on left  M&T dry   Cardiovascular: Normal rate, regular rhythm, normal heart sounds and intact distal pulses. Exam reveals no gallop and no friction rub. No murmur heard. Pulmonary/Chest: Effort normal. No respiratory distress. She has wheezes. She has no rales. She exhibits no tenderness. Scattered insp/exp wheezes x 2   Neurological: She is alert and oriented to person, place, and time. She displays abnormal reflex. No cranial nerve deficit. She exhibits abnormal muscle tone. Coordination abnormal.   Skin: Skin is warm and dry. No rash noted. She is not diaphoretic. No erythema. There is pallor. Vitals reviewed.   reviewed extensive hospitalization  3/5 labs, platelets 810,026, hgb 8.3, renal func normal  Med and prblem lists updated  ASSESSMENT and PLAN  severe anemia, at least partly B12 deficiency   Suspicious for TTP but not proven  Copd with chronic cough  resolving pneumonia on home IV antibx  aetd l ear  Plan  Defer to hematology  B12 shot  rx B12 for home care  Refill meds  Recheck in 1 month  Try to do without tussionex on daily basis  mucinex

## 2018-03-07 NOTE — PROGRESS NOTES
Per verbal orders of , injection of b12 1000mch given by Lizz Cuevas LPN. Patient instructed to remain in clinic for 20 minutes afterwards, and to report any adverse reaction to me immediately. Medication documentation completed. Tolerated well.

## 2018-03-20 ENCOUNTER — TELEPHONE (OUTPATIENT)
Dept: FAMILY MEDICINE CLINIC | Age: 73
End: 2018-03-20

## 2018-03-20 NOTE — TELEPHONE ENCOUNTER
Raul from [facility] called to report that the pt had a fall this morning going to the commode. She did hit her head on the dresser but she does not report any pain or any other pain.

## 2018-03-28 ENCOUNTER — PATIENT OUTREACH (OUTPATIENT)
Dept: FAMILY MEDICINE CLINIC | Age: 73
End: 2018-03-28

## 2018-03-28 RX ORDER — NIFEDIPINE 30 MG/1
30 TABLET, EXTENDED RELEASE ORAL DAILY
Qty: 90 TAB | Refills: 1 | Status: SHIPPED | OUTPATIENT
Start: 2018-03-28 | End: 2018-04-10 | Stop reason: ALTCHOICE

## 2018-03-28 RX ORDER — PILOCARPINE HYDROCHLORIDE 5 MG/1
5 TABLET, FILM COATED ORAL 2 TIMES DAILY
Qty: 60 TAB | Refills: 0 | Status: SHIPPED | OUTPATIENT
Start: 2018-03-28

## 2018-03-28 RX ORDER — METOPROLOL TARTRATE 25 MG/1
25 TABLET, FILM COATED ORAL 2 TIMES DAILY
Qty: 180 TAB | Refills: 0 | Status: SHIPPED | OUTPATIENT
Start: 2018-03-28

## 2018-03-28 NOTE — Clinical Note
is calling in medication refills. He is requesting low dose lasix for ankle swelling he said it is not a blood clot because he knows those symptoms from previous clot. You put her on lasix in the past. I did not see that order on current medication profile.  Thanks

## 2018-03-28 NOTE — PROGRESS NOTES
Check in with patient vicky visit.  answer the phone gave NN update on patients progress since interview also requesting medication refills. Wife had a soft fall last week Monday to be exact. No injuries as last year she had over 25 falls with injuries until the neurologist got her medications straight.  complaining about how hard it was to get an appointment with Dr Erna Harrington. They had to go [place] his office park themselves in the hallway until someone came out to give them an appointment this was done on yesterday. The appointment is for April 9,2018 and she comes back to see Dr Mahendra Szymanski on April 10 th. Patient is having ankle edema and he has started using her ELISABETH hose again but wanted to ask Dr Mahendra Szymanski about some lasix that he put her own several years ago. Melani Mate He knows it is not a blood clot from her previous one she had before. He has some in the medicine cabinet, but he will not give it to her without his okay. NN said it the lasix is that old it needs to be thrown away.  talked to NN for 31 minutes NN will put in refill request for patients' Procardia xl 30 mg daily ,Lopressor 25 mg daily and Pilocarpine 5 mg twice a day.  HH still comes in to monitor and give B 12 shots

## 2018-04-10 ENCOUNTER — OFFICE VISIT (OUTPATIENT)
Dept: FAMILY MEDICINE CLINIC | Age: 73
End: 2018-04-10

## 2018-04-10 VITALS
RESPIRATION RATE: 18 BRPM | TEMPERATURE: 97 F | DIASTOLIC BLOOD PRESSURE: 46 MMHG | HEART RATE: 81 BPM | SYSTOLIC BLOOD PRESSURE: 95 MMHG | OXYGEN SATURATION: 92 % | HEIGHT: 64 IN

## 2018-04-10 DIAGNOSIS — R05.3 CHRONIC COUGH: Primary | ICD-10-CM

## 2018-04-10 DIAGNOSIS — J45.40 MODERATE PERSISTENT ASTHMA WITHOUT COMPLICATION: Chronic | ICD-10-CM

## 2018-04-10 DIAGNOSIS — R60.0 BILATERAL LEG EDEMA: ICD-10-CM

## 2018-04-10 PROBLEM — S02.85XD CLOSED FRACTURE OF ORBIT WITH ROUTINE HEALING: Status: ACTIVE | Noted: 2018-04-10

## 2018-04-10 PROBLEM — I82.621 ARM DVT (DEEP VENOUS THROMBOEMBOLISM), ACUTE, RIGHT (HCC): Status: ACTIVE | Noted: 2018-04-10

## 2018-04-10 PROBLEM — M86.9 OSTEOMYELITIS OF LEFT HUMERUS (HCC): Status: RESOLVED | Noted: 2018-04-10 | Resolved: 2018-04-10

## 2018-04-10 PROBLEM — S02.85XD CLOSED FRACTURE OF ORBIT WITH ROUTINE HEALING: Status: RESOLVED | Noted: 2018-04-10 | Resolved: 2018-04-10

## 2018-04-10 PROBLEM — I82.621 ARM DVT (DEEP VENOUS THROMBOEMBOLISM), ACUTE, RIGHT (HCC): Status: RESOLVED | Noted: 2018-04-10 | Resolved: 2018-04-10

## 2018-04-10 PROBLEM — M86.9 OSTEOMYELITIS OF LEFT HUMERUS (HCC): Status: ACTIVE | Noted: 2018-04-10

## 2018-04-10 RX ORDER — HYDROCODONE POLISTIREX AND CHLORPHENIRAMINE POLISTIREX 10; 8 MG/5ML; MG/5ML
5 SUSPENSION, EXTENDED RELEASE ORAL
Qty: 120 ML | Refills: 0 | Status: SHIPPED | OUTPATIENT
Start: 2018-04-10 | End: 2018-04-23 | Stop reason: SDUPTHER

## 2018-04-10 RX ORDER — BENZONATATE 100 MG/1
100 CAPSULE ORAL
Qty: 30 CAP | Refills: 0 | Status: SHIPPED | OUTPATIENT
Start: 2018-04-10 | End: 2018-08-17

## 2018-04-10 NOTE — PROGRESS NOTES
Geraldo Ramos is a 67 y.o. female (: 1945) presenting to address:    Chief Complaint   Patient presents with    Follow-up     History of TTP (thrombotic thrombocytopenic purpura)     Anemia       Vitals:    04/10/18 1113   BP: 95/46   Pulse: 81   Resp: 18   Temp: 97 °F (36.1 °C)   TempSrc: Oral   SpO2: 92%   Height: 5' 4\" (1.626 m)   PainSc:   0 - No pain       Hearing/Vision:   No exam data present    Learning Assessment:     Learning Assessment 2014   PRIMARY LEARNER Patient   HIGHEST LEVEL OF EDUCATION - PRIMARY LEARNER  GRADUATED HIGH SCHOOL OR GED   BARRIERS PRIMARY LEARNER NONE   CO-LEARNER CAREGIVER No   PRIMARY LANGUAGE ENGLISH   LEARNER PREFERENCE PRIMARY OTHER (COMMENT)   ANSWERED BY Patient   RELATIONSHIP SELF     Depression Screening:     PHQ over the last two weeks 3/4/2014   Little interest or pleasure in doing things Nearly every day   Feeling down, depressed or hopeless Nearly every day   Total Score PHQ 2 6   Trouble falling or staying asleep, or sleeping too much Nearly every day   Feeling tired or having little energy Not at all   Poor appetite or overeating Several days   Feeling bad about yourself - or that you are a failure or have let yourself or your family down Nearly every day   Trouble concentrating on things such as school, work, reading or watching TV Nearly every day   Moving or speaking so slowly that other people could have noticed; or the opposite being so fidgety that others notice Nearly every day   Thoughts of being better off dead, or hurting yourself in some way Nearly every day   How difficult have these problems made it for you to do your work, take care of your home and get along with others Extremely difficult     Fall Risk Assessment:     Fall Risk Assessment, last 12 mths 4/10/2018   Able to walk? Yes   Fall in past 12 months?  No   Fall with injury? -   Number of falls in past 12 months -   Fall Risk Score -     Abuse Screening:   No flowsheet data found. Coordination of Care Questionaire:   1. Have you been to the ER, urgent care clinic since your last visit? Hospitalized since your last visit? NO    2. Have you seen or consulted any other health care providers outside of the [facility] since your last visit? Include any pap smears or colon screening. YES Dr. Nathanael Watson    Advanced Directive:   1. Do you have an Advanced Directive? YES    2. Would you like information on Advanced Directives?  NO

## 2018-04-10 NOTE — MR AVS SNAPSHOT
[address]  [address] [address] [facility] [id number] [phone] Patient: Briana Ames MRN: [id number] UHU:[id number] Visit Information Date & Time Provider Department Dept. Phone Encounter #  
 4/10/2018 11:00 AM Brian Damon MD [address] [place] [address] [phone] [id number] Follow-up Instructions Return in about 1 month (around 5/10/2018). Follow-up and Disposition History Upcoming Health Maintenance Date Due Hepatitis C Screening 1945 COLONOSCOPY 5/2/1963 ZOSTER VACCINE AGE 60> 3/2/2005 MEDICARE YEARLY EXAM 3/28/2018 BREAST CANCER SCRN MAMMOGRAM 6/13/2019 GLAUCOMA SCREENING Q2Y 10/30/2019 DTaP/Tdap/Td series (2 - Td) 3/4/2020 Allergies as of 4/10/2018  Review Complete On: 4/10/2018 By: Brian Damon MD  
  
 Severity Noted Reaction Type Reactions Amoxicillin  08/23/2010    Unknown (comments) Avelox [Moxifloxacin]  08/23/2010    Unknown (comments) Celexa [Citalopram]  08/23/2010    Unknown (comments) Ciprofloxacin  08/23/2010    Unknown (comments) Doxycycline  01/17/2017    Nausea and Vomiting Levaquin [Levofloxacin]  08/23/2010    Unknown (comments) Paxil [Paroxetine Hcl]  08/23/2010    Unknown (comments) Sulfa (Sulfonamide Antibiotics)  08/23/2010    Unknown (comments) Joints ache Tequin [Gatifloxacin]  08/23/2010    Unknown (comments) Current Immunizations  Reviewed on 10/2/2017 Name Date Influenza High Dose Vaccine PF 10/2/2017 11:24 AM, 10/31/2016  2:06 PM, 10/20/2015 Influenza Vaccine 10/2/2017 12:00 AM, 10/22/2013, 9/1/2009 12:00 AM  
 Influenza Vaccine (Quad) 11/24/2014 12:17 PM  
 Influenza Vaccine Split 11/16/2011 Pneumococcal Polysaccharide (PPSV-23) 8/1/2013 12:00 AM  
 Tdap 3/4/2010 12:00 AM  
  
 Not reviewed this visit You Were Diagnosed With   
  
 Codes Comments Chronic cough    -  Primary ICD-10-CM: G34 ICD-9-CM: 786.2 Moderate persistent asthma without complication     KLN-51-DX: J45.40 ICD-9-CM: 493.90 Bilateral leg edema     ICD-10-CM: R60.0 ICD-9-CM: 894. 3 Vitals BP Pulse Temp Resp Height(growth percentile) SpO2  
 95/46 (BP 1 Location: Left arm, BP Patient Position: Sitting) 81 97 °F (36.1 °C) (Oral) 18 5' 4\" (1.626 m) 92% OB Status Smoking Status Postmenopausal Never Smoker Preferred Pharmacy Pharmacy Name Phone [facility] [address], [address] [facility] [address] [facility] [phone] Your Updated Medication List  
  
   
This list is accurate as of 4/10/18 12:06 PM.  Always use your most recent med list.  
  
  
  
  
 acetylcysteine 100 mg/mL (10 %) nebulizer solution Commonly known as:  MUCOMYST Take 4 mL by inhalation two (2) times a day. albuterol-ipratropium 2.5 mg-0.5 mg/3 ml Nebu Commonly known as:  DUO-NEB  
3 mL by Nebulization route every six (6) hours as needed. Use 1 vial t.i.d. Via nebulizer prn  
  
 benzonatate 100 mg capsule Commonly known as:  TESSALON Take 1 Cap by mouth every eight (8) hours as needed. * budesonide 0.5 mg/2 mL Nbsp Commonly known as:  PULMICORT  
2 mL by Nebulization route two (2) times a day. * budesonide 1 mg/2 mL Nbsp Commonly known as:  PULMICORT  
2 mL by Nebulization route two (2) times a day. CALTRATE 600+D PLUS MINERALS 600 mg calcium- 400 unit Tab Generic drug:  Calcium Carbonate-Vit D3-Min Take  by mouth daily. CENTRUM SILVER Tab tablet Generic drug:  multivitamins-minerals-lutein Take  by mouth daily. * chlorpheniramine-HYDROcodone 10-8 mg/5 mL suspension Commonly known as:  Rebeka Dull Take 5 mL by mouth every twelve (12) hours as needed for Cough. Max Daily Amount: 10 mL. * chlorpheniramine-HYDROcodone 10-8 mg/5 mL suspension Commonly known as:  Rebeka Dull Take 5 mL by mouth every twelve (12) hours as needed for Cough. Max Daily Amount: 10 mL. clonazePAM 0.5 mg tablet Commonly known as:  Lore [place] Take 1 Tab by mouth two (2) times a day. Max Daily Amount: 1 mg.  
  
 guaiFENesin 100 mg/5 mL liquid Commonly known as:  ROBITUSSIN Take 100 mg by mouth every six (6) hours as needed. HYDROcodone-acetaminophen  mg tablet Commonly known as:  Rosenhayn No Take 1 Tab by mouth every eight (8) hours as needed. hydrOXYzine HCl 50 mg tablet Commonly known as:  ATARAX Take 50 mg by mouth every eight (8) hours as needed. Anxiety  
  
 latanoprost 0.005 % ophthalmic solution Commonly known as:  Author Leap Administer 1 Drop to both eyes nightly. levothyroxine 100 mcg tablet Commonly known as:  SYNTHROID Take 1 Tab by mouth Daily (before breakfast). metoprolol tartrate 25 mg tablet Commonly known as:  LOPRESSOR Take 1 Tab by mouth two (2) times a day. OLANZapine 5 mg tablet Commonly known as:  ZyPREXA Take 5 mg by mouth nightly. pilocarpine 5 mg tablet Commonly known as:  SALAGEN (PILOCARPINE) Take 1 Tab by mouth two (2) times a day. traZODone 50 mg tablet Commonly known as:  Jose Henry Take 50 mg by mouth nightly. VITAMIN D3 1,000 unit Cap Generic drug:  cholecalciferol Take  by mouth two (2) times a day. ZONALON, PRUDOXIN 5 % topical cream  
Commonly known as:  doxepin (ZONALON, PRUDOXIN) 5% cream  
Apply  to affected area three (3) times daily. * Notice: This list has 4 medication(s) that are the same as other medications prescribed for you. Read the directions carefully, and ask your doctor or other care provider to review them with you. Prescriptions Printed Refills  
 chlorpheniramine-HYDROcodone (TUSSIONEX) 10-8 mg/5 mL suspension 0 Sig: Take 5 mL by mouth every twelve (12) hours as needed for Cough. Max Daily Amount: 10 mL. Class: Print  Route: Oral  
 Prescriptions Sent to Pharmacy Refills  
 benzonatate (TESSALON) 100 mg capsule 0 Sig: Take 1 Cap by mouth every eight (8) hours as needed. Class: Normal  
 Pharmacy: [facility] [address] [place] [address] Rd, [address] #: [phone] Route: Oral  
  
Follow-up Instructions Return in about 1 month (around 5/10/2018). Introducing [facility] SERVICES! Dear Mauricio Parekh: Thank you for requesting a [employer] account. Our records indicate that you already have an active [employer] account. You can access your account anytime at [email or web address] Did you know that you can access your hospital and ER discharge instructions at any time in [employer]? You can also review all of your test results from your hospital stay or ER visit. Additional Information If you have questions, please visit the Frequently Asked Questions section of the [employer] website at [email or web address] Remember, [employer] is NOT to be used for urgent needs. For medical emergencies, dial 911. Now available from your iPhone and Android! Please provide this summary of care documentation to your next provider. Your primary care clinician is listed as Janae Pallas. If you have any questions after today's visit, please call [phone].

## 2018-04-10 NOTE — PROGRESS NOTES
HISTORY OF PRESENT ILLNESS  Rocío Do is a 67 y.o. female. HPI  Chronic lung disease with cough stable  C/o swelling ankles, partly dependant   Off chronic opiates  Review of Systems   Respiratory: Positive for cough. Cardiovascular: Positive for leg swelling. All other systems reviewed and are negative. Past Medical History:   Diagnosis Date    Arm DVT (deep venous thromboembolism), acute, right (HCC) 4/10/2018    Bronchiolitis     Closed fracture of orbit with routine healing 4/10/2018    Cough     GERD (gastroesophageal reflux disease)     went for surgery for it    Hypertension     [facility]) [facility] [address]     Osteomyelitis of left humerus ([facility] [address] 4/10/2018       Current Outpatient Prescriptions:     pilocarpine (SALAGEN, PILOCARPINE,) 5 mg tablet, Take 1 Tab by mouth two (2) times a day., Disp: 60 Tab, Rfl: 0    NIFEdipine ER (PROCARDIA XL) 30 mg ER tablet, Take 1 Tab by mouth daily. , Disp: 90 Tab, Rfl: 1    metoprolol tartrate (LOPRESSOR) 25 mg tablet, Take 1 Tab by mouth two (2) times a day., Disp: 180 Tab, Rfl: 0    chlorpheniramine-HYDROcodone (TUSSIONEX) 10-8 mg/5 mL suspension, Take 5 mL by mouth every twelve (12) hours as needed for Cough. Max Daily Amount: 10 mL., Disp: 120 mL, Rfl: 0    benzonatate (TESSALON) 100 mg capsule, Take 100 mg by mouth every eight (8) hours as needed. , Disp: , Rfl:     guaiFENesin (ROBITUSSIN) 100 mg/5 mL liquid, Take 100 mg by mouth every six (6) hours as needed. , Disp: , Rfl:     hydrOXYzine HCl (ATARAX) 50 mg tablet, Take 50 mg by mouth every eight (8) hours as needed.  Anxiety, Disp: , Rfl:     latanoprost (XALATAN) 0.005 % ophthalmic solution, Administer 1 Drop to both eyes nightly., Disp: , Rfl:     OLANZapine (ZYPREXA) 5 mg tablet, Take 5 mg by mouth nightly., Disp: , Rfl:     traZODone (DESYREL) 50 mg tablet, Take 50 mg by mouth nightly., Disp: , Rfl:     HYDROcodone-acetaminophen (NORCO)  mg tablet, Take 1 Tab by mouth every eight (8) hours as needed. , Disp: , Rfl:     budesonide (PULMICORT) 1 mg/2 mL nbsp, 2 mL by Nebulization route two (2) times a day., Disp: 60 Each, Rfl: 1    chlorpheniramine-HYDROcodone (TUSSIONEX) 10-8 mg/5 mL suspension, Take 5 mL by mouth every twelve (12) hours as needed for Cough. Max Daily Amount: 10 mL., Disp: 200 mL, Rfl: 0    acetylcysteine (MUCOMYST) 100 mg/mL (10 %) nebulizer solution, Take 4 mL by inhalation two (2) times a day., Disp: 120 mL, Rfl: 0    ZONALON, PRUDOXIN (DOXEPIN, ZONALON, PRUDOXIN, 5% CREAM) 5 % topical cream, Apply  to affected area three (3) times daily. , Disp: 30 g, Rfl: 0    albuterol-ipratropium (DUO-NEB) 2.5 mg-0.5 mg/3 ml nebu, 3 mL by Nebulization route every six (6) hours as needed. Use 1 vial t.i.d. Via nebulizer prn, Disp: 360 Nebule, Rfl: 1    clonazePAM (KLONOPIN) 0.5 mg tablet, Take 1 Tab by mouth two (2) times a day. Max Daily Amount: 1 mg., Disp: 30 Tab, Rfl: 0    cholecalciferol (VITAMIN D3) 1,000 unit cap, Take  by mouth two (2) times a day., Disp: , Rfl:     budesonide (PULMICORT) 0.5 mg/2 mL nbsp, 2 mL by Nebulization route two (2) times a day. (Patient taking differently: 500 mcg by Nebulization route two (2) times daily as needed.), Disp: 60 Each, Rfl: 3    levothyroxine (SYNTHROID) 100 mcg tablet, Take 1 Tab by mouth Daily (before breakfast). , Disp: 90 Tab, Rfl: 3    Calcium Carbonate-Vit D3-Min (CALTRATE 600+D PLUS MINERALS) 600 mg calcium- 400 unit Tab, Take  by mouth daily. , Disp: , Rfl:     multivitamins-minerals-lutein (CENTRUM SILVER) Tab, Take  by mouth daily. , Disp: , Rfl:   Visit Vitals    BP 95/46 (BP 1 Location: Left arm, BP Patient Position: Sitting)    Pulse 81    Temp 97 °F (36.1 °C) (Oral)    Resp 18    Ht 5' 4\" (1.626 m)    SpO2 92%       Physical Exam   Constitutional: She appears well-developed and well-nourished. No distress.    Cardiovascular: Normal rate, regular rhythm, normal heart sounds and intact distal pulses. Exam reveals no gallop and no friction rub. No murmur heard. Pulmonary/Chest: Effort normal. No respiratory distress. She has wheezes. She has no rales. She exhibits no tenderness. Musculoskeletal: She exhibits no tenderness or deformity. Antalgic gait   Skin: She is not diaphoretic. Vitals reviewed. ASSESSMENT and PLAN  chronic asthma   Leg edema, ?  Procardia  Plan  Stop Procardia  Monitor BP recheck in 1 month & labs

## 2018-04-12 ENCOUNTER — PATIENT OUTREACH (OUTPATIENT)
Dept: FAMILY MEDICINE CLINIC | Age: 73
End: 2018-04-12

## 2018-04-12 NOTE — PROGRESS NOTES
Pt has had no further hospital or ED admissions. Pt attended hospital follow up and is taking all medications as directed. Goals met. Navigation type closed. Episode resolved. No further follow up scheduled.

## 2018-04-23 DIAGNOSIS — R05.3 CHRONIC COUGH: ICD-10-CM

## 2018-04-23 RX ORDER — HYDROCODONE POLISTIREX AND CHLORPHENIRAMINE POLISTIREX 10; 8 MG/5ML; MG/5ML
5 SUSPENSION, EXTENDED RELEASE ORAL
Qty: 120 ML | Refills: 0 | Status: SHIPPED | OUTPATIENT
Start: 2018-04-23 | End: 2018-05-09 | Stop reason: SDUPTHER

## 2018-05-01 RX ORDER — DOXEPIN HYDROCHLORIDE 50 MG/G
CREAM TOPICAL
Qty: 45 G | Refills: 0 | Status: SHIPPED | OUTPATIENT
Start: 2018-05-01

## 2018-05-03 ENCOUNTER — TELEPHONE (OUTPATIENT)
Dept: FAMILY MEDICINE CLINIC | Age: 73
End: 2018-05-03

## 2018-05-03 NOTE — TELEPHONE ENCOUNTER
Pt states that she was given a prescription for itching and does not remember the name of the pills given. Pt would like a prescription refill of the medication.  Pt states she is currently taking Doxepin topical cream.

## 2018-05-04 NOTE — TELEPHONE ENCOUNTER
Future appt for pt was made in relation to renewed prescription.    Future Appointments  Date Time Provider Joseph Burch   5/9/2018 11:30 AM Ingris Barragan MD [facility]

## 2018-05-09 ENCOUNTER — OFFICE VISIT (OUTPATIENT)
Dept: FAMILY MEDICINE CLINIC | Age: 73
End: 2018-05-09

## 2018-05-09 VITALS
HEART RATE: 90 BPM | HEIGHT: 64 IN | SYSTOLIC BLOOD PRESSURE: 136 MMHG | RESPIRATION RATE: 18 BRPM | OXYGEN SATURATION: 90 % | DIASTOLIC BLOOD PRESSURE: 74 MMHG | TEMPERATURE: 97.7 F | WEIGHT: 124.8 LBS | BODY MASS INDEX: 21.31 KG/M2

## 2018-05-09 DIAGNOSIS — J44.1 COPD EXACERBATION (HCC): ICD-10-CM

## 2018-05-09 DIAGNOSIS — R05.3 CHRONIC COUGH: ICD-10-CM

## 2018-05-09 DIAGNOSIS — F11.20 UNCOMPLICATED OPIOID DEPENDENCE (HCC): ICD-10-CM

## 2018-05-09 DIAGNOSIS — J96.00 ACUTE RESPIRATORY FAILURE, UNSPECIFIED WHETHER WITH HYPOXIA OR HYPERCAPNIA (HCC): Primary | ICD-10-CM

## 2018-05-09 RX ORDER — METHYLPREDNISOLONE 4 MG/1
TABLET ORAL
Qty: 1 DOSE PACK | Refills: 0 | Status: SHIPPED | OUTPATIENT
Start: 2018-05-09 | End: 2018-08-17

## 2018-05-09 RX ORDER — HYDROCODONE POLISTIREX AND CHLORPHENIRAMINE POLISTIREX 10; 8 MG/5ML; MG/5ML
5 SUSPENSION, EXTENDED RELEASE ORAL
Qty: 120 ML | Refills: 0 | Status: SHIPPED | OUTPATIENT
Start: 2018-05-09 | End: 2018-05-29 | Stop reason: SDUPTHER

## 2018-05-09 RX ORDER — CEFDINIR 300 MG/1
300 CAPSULE ORAL 2 TIMES DAILY
Qty: 20 CAP | Refills: 0 | Status: SHIPPED | OUTPATIENT
Start: 2018-05-09 | End: 2018-05-19

## 2018-05-09 RX ORDER — HYDROXYZINE 50 MG/1
50 TABLET, FILM COATED ORAL
Qty: 60 TAB | Refills: 1 | Status: SHIPPED | OUTPATIENT
Start: 2018-05-09

## 2018-05-09 RX ORDER — DEXAMETHASONE SODIUM PHOSPHATE 4 MG/ML
4 INJECTION, SOLUTION INTRA-ARTICULAR; INTRALESIONAL; INTRAMUSCULAR; INTRAVENOUS; SOFT TISSUE ONCE
Qty: 1 VIAL | Refills: 0
Start: 2018-05-09 | End: 2018-05-09

## 2018-05-09 RX ORDER — CEFTRIAXONE 500 MG/1
500 INJECTION, POWDER, FOR SOLUTION INTRAMUSCULAR; INTRAVENOUS ONCE
Qty: 500 MG | Refills: 0
Start: 2018-05-09 | End: 2018-05-09

## 2018-05-09 NOTE — PROGRESS NOTES
HISTORY OF PRESENT ILLNESS  Tiny Metcalf is a 68 y.o. female. HPI  Chronic lung disease with recent increased cough, sputum  Chronic cough  ? TTP under hematology  Review of Systems   Respiratory: Positive for cough and wheezing. All other systems reviewed and are negative. Past Medical History:   Diagnosis Date    Arm DVT (deep venous thromboembolism), acute, right (HCC) 4/10/2018    Bronchiolitis     Closed fracture of orbit with routine healing 4/10/2018    Cough     GERD (gastroesophageal reflux disease)     went for surgery for it    Hypertension     [facility] ([facility]) ([facility] [address]     Osteomyelitis of left humerus ([facility] [address] 4/10/2018     Current Outpatient Prescriptions on File Prior to Visit   Medication Sig Dispense Refill    ZONALON, PRUDOXIN (DOXEPIN, ZONALON, PRUDOXIN, 5% CREAM) 5 % topical cream APPLY EXTERNALLY TO THE AFFECTED AREA THREE TIMES DAILY 45 g 0    chlorpheniramine-HYDROcodone (TUSSIONEX) 10-8 mg/5 mL suspension Take 5 mL by mouth every twelve (12) hours as needed for Cough. Max Daily Amount: 10 mL. 120 mL 0    naloxone 2 mg/actuation spry Use 1 spray intranasally into 1 nostril. Use a new Narcan nasal spray for subsequent doses and administer into alternating nostrils. May repeat every 2 to 3 minutes as needed. 1 Device 0    benzonatate (TESSALON) 100 mg capsule Take 1 Cap by mouth every eight (8) hours as needed. 30 Cap 0    pilocarpine (SALAGEN, PILOCARPINE,) 5 mg tablet Take 1 Tab by mouth two (2) times a day. 60 Tab 0    metoprolol tartrate (LOPRESSOR) 25 mg tablet Take 1 Tab by mouth two (2) times a day. 180 Tab 0    guaiFENesin (ROBITUSSIN) 100 mg/5 mL liquid Take 100 mg by mouth every six (6) hours as needed.  hydrOXYzine HCl (ATARAX) 50 mg tablet Take 50 mg by mouth every eight (8) hours as needed. Anxiety      latanoprost (XALATAN) 0.005 % ophthalmic solution Administer 1 Drop to both eyes nightly.       OLANZapine (ZYPREXA) 5 mg tablet Take 5 mg by mouth nightly.  traZODone (DESYREL) 50 mg tablet Take 50 mg by mouth nightly.  HYDROcodone-acetaminophen (NORCO)  mg tablet Take 1 Tab by mouth every eight (8) hours as needed.  budesonide (PULMICORT) 1 mg/2 mL nbsp 2 mL by Nebulization route two (2) times a day. 60 Each 1    chlorpheniramine-HYDROcodone (TUSSIONEX) 10-8 mg/5 mL suspension Take 5 mL by mouth every twelve (12) hours as needed for Cough. Max Daily Amount: 10 mL. 200 mL 0    acetylcysteine (MUCOMYST) 100 mg/mL (10 %) nebulizer solution Take 4 mL by inhalation two (2) times a day. 120 mL 0    albuterol-ipratropium (DUO-NEB) 2.5 mg-0.5 mg/3 ml nebu 3 mL by Nebulization route every six (6) hours as needed. Use 1 vial t.i.d. Via nebulizer prn 360 Nebule 1    clonazePAM (KLONOPIN) 0.5 mg tablet Take 1 Tab by mouth two (2) times a day. Max Daily Amount: 1 mg. 30 Tab 0    cholecalciferol (VITAMIN D3) 1,000 unit cap Take  by mouth two (2) times a day.  budesonide (PULMICORT) 0.5 mg/2 mL nbsp 2 mL by Nebulization route two (2) times a day. (Patient taking differently: 500 mcg by Nebulization route two (2) times daily as needed.) 60 Each 3    levothyroxine (SYNTHROID) 100 mcg tablet Take 1 Tab by mouth Daily (before breakfast). 90 Tab 3    Calcium Carbonate-Vit D3-Min (CALTRATE 600+D PLUS MINERALS) 600 mg calcium- 400 unit Tab Take  by mouth daily.  multivitamins-minerals-lutein (CENTRUM SILVER) Tab Take  by mouth daily. No current facility-administered medications on file prior to visit. Visit Vitals    /74 (BP 1 Location: Right arm, BP Patient Position: Sitting)    Pulse 90    Temp 97.7 °F (36.5 °C) (Oral)    Resp 18    Ht 5' 4\" (1.626 m)    Wt 124 lb 12.8 oz (56.6 kg)    SpO2 90%    BMI 21.42 kg/m2       Physical Exam   Constitutional: She appears well-developed and well-nourished. No distress. Cardiovascular: Normal rate, regular rhythm, normal heart sounds and intact distal pulses.   Exam reveals no gallop and no friction rub. No murmur heard. Pulmonary/Chest: Effort normal. No respiratory distress. She has wheezes. She has no rales. She exhibits no tenderness. Diminished breath sounds     Skin: She is not diaphoretic. Vitals reviewed.       ASSESSMENT and PLAN  copd exacerbation   Plan  Rocephin 500 mg > cefdinir  Decadron 4 mg > medrol  Refill tussionex  HHN tid  F/u by phone Friday

## 2018-05-09 NOTE — PROGRESS NOTES
Christine Ramachandran is a 68 y.o. female (: 1945) presenting to address:    Chief Complaint   Patient presents with    Rash     x two weeks         pain scale 0/10       Vitals:    18 1149   BP: 136/74   Pulse: 90   Resp: 18   Temp: 97.7 °F (36.5 °C)   TempSrc: Oral   SpO2: 90%   Weight: 124 lb 12.8 oz (56.6 kg)   Height: 5' 4\" (1.626 m)   PainSc:   0 - No pain       Hearing/Vision:   No exam data present    Learning Assessment:     Learning Assessment 2014   PRIMARY LEARNER Patient   HIGHEST LEVEL OF EDUCATION - PRIMARY LEARNER  GRADUATED HIGH SCHOOL OR GED   BARRIERS PRIMARY LEARNER NONE   CO-LEARNER CAREGIVER No   PRIMARY LANGUAGE ENGLISH   LEARNER PREFERENCE PRIMARY OTHER (COMMENT)   ANSWERED BY Patient   RELATIONSHIP SELF     Depression Screening:     PHQ over the last two weeks 3/4/2014   Little interest or pleasure in doing things Nearly every day   Feeling down, depressed or hopeless Nearly every day   Total Score PHQ 2 6   Trouble falling or staying asleep, or sleeping too much Nearly every day   Feeling tired or having little energy Not at all   Poor appetite or overeating Several days   Feeling bad about yourself - or that you are a failure or have let yourself or your family down Nearly every day   Trouble concentrating on things such as school, work, reading or watching TV Nearly every day   Moving or speaking so slowly that other people could have noticed; or the opposite being so fidgety that others notice Nearly every day   Thoughts of being better off dead, or hurting yourself in some way Nearly every day   How difficult have these problems made it for you to do your work, take care of your home and get along with others Extremely difficult     Fall Risk Assessment:     Fall Risk Assessment, last 12 mths 2018   Able to walk? Yes   Fall in past 12 months?  No   Fall with injury? -   Number of falls in past 12 months -   Fall Risk Score -     Abuse Screening:   No flowsheet data found. Coordination of Care Questionaire:   1. Have you been to the ER, urgent care clinic since your last visit? Hospitalized since your last visit? NO    2. Have you seen or consulted any other health care providers outside of [facility] [place] since your last visit? Include any pap smears or colon screening. YES Dr. Lynne Jarrell    Advanced Directive:   1. Do you have an Advanced Directive? YES    2. Would you like information on Advanced Directives?  NO

## 2018-05-09 NOTE — MR AVS SNAPSHOT
[address] [facility] 
 
 
 [address] [facility] [id number] [phone] Patient: Lopez Silva MRN: [id number] LOJ:[birth year] Visit Information Date & Time Provider Department Dept. Phone Encounter #  
 5/9/2018 11:30 AM Olesya Marc, [employer] [phone] [id number] Follow-up Instructions Return in about 1 month (around 6/9/2018). Your Appointments 5/15/2018  4:00 PM  
Follow Up with Olesya Marc MD  
[employer] [facility]) Appt Note: Renew Prescription [address] 2201 [facility] [id number] [phone]  
  
   
 [address] Upcoming Health Maintenance Date Due Hepatitis C Screening 1945 COLONOSCOPY 5/2/1963 ZOSTER VACCINE AGE 60> 3/2/2005 MEDICARE YEARLY EXAM 3/28/2018 Influenza Age 5 to Adult 8/1/2018 BREAST CANCER SCRN MAMMOGRAM 6/13/2019 GLAUCOMA SCREENING Q2Y 10/30/2019 DTaP/Tdap/Td series (2 - Td) 3/4/2020 Allergies as of 5/9/2018  Review Complete On: 5/9/2018 By: Olesya Marc MD  
  
 Severity Noted Reaction Type Reactions Amoxicillin  08/23/2010    Unknown (comments) Avelox [Moxifloxacin]  08/23/2010    Unknown (comments) Celexa [Citalopram]  08/23/2010    Unknown (comments) Ciprofloxacin  08/23/2010    Unknown (comments) Doxycycline  01/17/2017    Nausea and Vomiting Levaquin [Levofloxacin]  08/23/2010    Unknown (comments) Paxil [Paroxetine Hcl]  08/23/2010    Unknown (comments) Sulfa (Sulfonamide Antibiotics)  08/23/2010    Unknown (comments) Joints ache Tequin [Gatifloxacin]  08/23/2010    Unknown (comments) Current Immunizations  Reviewed on 10/2/2017 Name Date Influenza High Dose Vaccine PF 10/2/2017 11:24 AM, 10/31/2016  2:06 PM, 10/20/2015 Influenza Vaccine 10/2/2017 12:00 AM, 10/22/2013, 9/1/2009 12:00 AM  
 Influenza Vaccine (Quad) 11/24/2014 12:17 PM  
 Influenza Vaccine Split 11/16/2011 Pneumococcal Polysaccharide (PPSV-23) 8/1/2013 12:00 AM  
 Tdap 3/4/2010 12:00 AM  
  
 Not reviewed this visit You Were Diagnosed With   
  
 Codes Comments Acute respiratory failure, unspecified whether with hypoxia or hypercapnia ([facility]    -  Primary ICD-10-CM: J96.00 
ICD-9-CM: 518.81 Uncomplicated opioid dependence ([facility]     ICD-10-CM: M69.89 ICD-9-CM: 304.00   
 COPD exacerbation ([facility].)     ICD-10-CM: J44.1 ICD-9-CM: 491.21 Chronic cough     ICD-10-CM: R05 ICD-9-CM: 828. 2 Vitals BP Pulse Temp Resp Height(growth percentile) Weight(growth percentile) 136/74 (BP 1 Location: Right arm, BP Patient Position: Sitting) 90 97.7 °F (36.5 °C) (Oral) 18 5' 4\" (1.626 m) 124 lb 12.8 oz (56.6 kg) SpO2 BMI OB Status Smoking Status 90% 21.42 kg/m2 Postmenopausal Never Smoker BMI and BSA Data Body Mass Index Body Surface Area  
 21.42 kg/m 2 1.6 m 2 Preferred Pharmacy Pharmacy Name Phone [facility] [address] [place] [address] [phone] Your Updated Medication List  
  
   
This list is accurate as of 5/9/18 12:23 PM.  Always use your most recent med list.  
  
  
  
  
 acetylcysteine 100 mg/mL (10 %) nebulizer solution Commonly known as:  MUCOMYST Take 4 mL by inhalation two (2) times a day. albuterol-ipratropium 2.5 mg-0.5 mg/3 ml Nebu Commonly known as:  DUO-NEB  
3 mL by Nebulization route every six (6) hours as needed. Use 1 vial t.i.d. Via nebulizer prn  
  
 benzonatate 100 mg capsule Commonly known as:  TESSALON Take 1 Cap by mouth every eight (8) hours as needed. * budesonide 0.5 mg/2 mL Nbsp Commonly known as:  PULMICORT  
2 mL by Nebulization route two (2) times a day. * budesonide 1 mg/2 mL [facility] Commonly known as:  PULMICORT  
2 mL by Nebulization route two (2) times a day. CALTRATE 600+D PLUS MINERALS 600 mg calcium- 400 unit Tab Generic drug:  Calcium Carbonate-Vit D3-Min Take  by mouth daily. cefdinir 300 mg capsule Commonly known as:  OMNICEF Take 1 Cap by mouth two (2) times a day for 10 days. Start Thursday  
  
 cefTRIAXone 500 mg injection Commonly known as:  ROCEPHIN  
500 mg by IntraMUSCular route once for 1 dose. CENTRUM SILVER Tab tablet Generic drug:  multivitamins-minerals-lutein Take  by mouth daily. * chlorpheniramine-HYDROcodone 10-8 mg/5 mL suspension Commonly known as:  Aquino Hind Take 5 mL by mouth every twelve (12) hours as needed for Cough. Max Daily Amount: 10 mL. * chlorpheniramine-HYDROcodone 10-8 mg/5 mL suspension Commonly known as:  Aquino Hind Take 5 mL by mouth every twelve (12) hours as needed for Cough. Max Daily Amount: 10 mL. clonazePAM 0.5 mg tablet Commonly known as:  Evlyn Vitaly Take 1 Tab by mouth two (2) times a day. Max Daily Amount: 1 mg.  
  
 dexamethasone 4 mg/mL injection Commonly known as:  DECADRON  
1 mL by IntraMUSCular route once for 1 dose. guaiFENesin 100 mg/5 mL liquid Commonly known as:  ROBITUSSIN Take 100 mg by mouth every six (6) hours as needed. HYDROcodone-acetaminophen  mg tablet Commonly known as:  [facility] Take 1 Tab by mouth every eight (8) hours as needed. hydrOXYzine HCl 50 mg tablet Commonly known as:  ATARAX Take 1 Tab by mouth every eight (8) hours as needed. itching  
  
 latanoprost 0.005 % ophthalmic solution Commonly known as:  Dot Dux Administer 1 Drop to both eyes nightly. levothyroxine 100 mcg tablet Commonly known as:  SYNTHROID Take 1 Tab by mouth Daily (before breakfast). methylPREDNISolone 4 mg tablet Commonly known as:  [place] Sample Start Thursday metoprolol tartrate 25 mg tablet Commonly known as:  LOPRESSOR Take 1 Tab by mouth two (2) times a day.  
  
 naloxone 2 mg/actuation Spry Use 1 spray intranasally into 1 nostril. Use a new Narcan nasal spray for subsequent doses and administer into alternating nostrils. May repeat every 2 to 3 minutes as needed. OLANZapine 5 mg tablet Commonly known as:  ZyPREXA Take 5 mg by mouth nightly. pilocarpine 5 mg tablet Commonly known as:  SALAGEN (PILOCARPINE) Take 1 Tab by mouth two (2) times a day. traZODone 50 mg tablet Commonly known as:  Nubia Faes Take 50 mg by mouth nightly. VITAMIN D3 1,000 unit Cap Generic drug:  cholecalciferol Take  by mouth two (2) times a day. ZONALON, PRUDOXIN 5 % topical cream  
Commonly known as:  doxepin (ZONALON, PRUDOXIN) 5% cream  
APPLY EXTERNALLY TO THE AFFECTED AREA THREE TIMES DAILY * Notice: This list has 4 medication(s) that are the same as other medications prescribed for you. Read the directions carefully, and ask your doctor or other care provider to review them with you. Prescriptions Printed Refills  
 chlorpheniramine-HYDROcodone (TUSSIONEX) 10-8 mg/5 mL suspension 0 Sig: Take 5 mL by mouth every twelve (12) hours as needed for Cough. Max Daily Amount: 10 mL. Class: Print Route: Oral  
 hydrOXYzine HCl (ATARAX) 50 mg tablet 1 Sig: Take 1 Tab by mouth every eight (8) hours as needed. itching Class: Print Route: Oral  
  
Prescriptions Sent to Pharmacy Refills  
 cefdinir (OMNICEF) 300 mg capsule 0 Sig: Take 1 Cap by mouth two (2) times a day for 10 days. Start Thursday Class: Normal  
 Pharmacy: [facility] [address] [place] [address] Rd, [address] #: [phone] Route: Oral  
 methylPREDNISolone (MEDROL DOSEPACK) 4 mg tablet 0 Sig: Start Thursday  Class: Normal  
 Pharmacy: [facility] [address] [facility] [address] [facility] [address], [address] #: [phone] We Performed the Following CEFTRIAXONE SODIUM INJECTION  MG [ [facility]] Comments:  
 Mix per protocol DEXAMETHASONE SODIUM PHOSPHATE INJECTION 1 MG [ [facility]] ME THER/PROPH/DIAG INJECTION, SUBCUT/IM [id number] CPT(R)] ME THER/PROPH/DIAG INJECTION, SUBCUT/IM [id number] CPT(R)] Follow-up Instructions Return in about 1 month (around 6/9/2018). Introducing [facility] & [facility] SERVICES! Dear Marina Martínez: Thank you for requesting a Socialmoth account. Our records indicate that you already have an active Socialmoth account. You can access your account anytime at [email or web address] Did you know that you can access your hospital and ER discharge instructions at any time in Socialmoth? You can also review all of your test results from your hospital stay or ER visit. Additional Information If you have questions, please visit the Frequently Asked Questions section of the Socialmoth website at [email or web address] Remember, Socialmoth is NOT to be used for urgent needs. For medical emergencies, dial 911. Now available from your iPhone and Android! Please provide this summary of care documentation to your next provider. Your primary care clinician is listed as Cynthia Neri. If you have any questions after today's visit, please call [phone].

## 2018-05-11 ENCOUNTER — TELEPHONE (OUTPATIENT)
Dept: FAMILY MEDICINE CLINIC | Age: 73
End: 2018-05-11

## 2018-05-11 NOTE — TELEPHONE ENCOUNTER
Pt spouse states that he left a message via [employer] of improvements and copy of recent blood test on pt, as well as, left a detailed message stating what the Nearway message detailed on MALINA Solano. Please advise.

## 2018-05-21 ENCOUNTER — TELEPHONE (OUTPATIENT)
Dept: FAMILY MEDICINE CLINIC | Age: 73
End: 2018-05-21

## 2018-05-21 NOTE — TELEPHONE ENCOUNTER
Patient called to report that she is having pain in the middle of her chest when breathing. She denies pain anywhere else. She states its not difficult to breath. She states she hasn't taken anything for the pain. Patient advised that since she is having chest pain she should go to the ER for an eval. Pt states she doesn't want to go to an ER or urgent care. Advised Gabriella didn't have an appointment until 5-29-18, pt scheduled for that day. Advised pt she should go to the ER and if she goes to call and let us know.

## 2018-05-29 ENCOUNTER — HOSPITAL ENCOUNTER (OUTPATIENT)
Dept: LAB | Age: 73
Discharge: HOME | End: 2018-05-29
Payer: MEDICARE

## 2018-05-29 ENCOUNTER — OFFICE VISIT (OUTPATIENT)
Dept: FAMILY MEDICINE CLINIC | Age: 73
End: 2018-05-29

## 2018-05-29 VITALS
RESPIRATION RATE: 22 BRPM | BODY MASS INDEX: 21.51 KG/M2 | DIASTOLIC BLOOD PRESSURE: 54 MMHG | HEIGHT: 64 IN | HEART RATE: 113 BPM | TEMPERATURE: 97.3 F | WEIGHT: 126 LBS | SYSTOLIC BLOOD PRESSURE: 130 MMHG

## 2018-05-29 DIAGNOSIS — J44.1 COPD EXACERBATION (HCC): ICD-10-CM

## 2018-05-29 DIAGNOSIS — S02.85XA ORBITAL FRACTURE, CLOSED, INITIAL ENCOUNTER (HCC): ICD-10-CM

## 2018-05-29 DIAGNOSIS — R05.3 CHRONIC COUGH: ICD-10-CM

## 2018-05-29 DIAGNOSIS — J44.1 COPD EXACERBATION (HCC): Primary | ICD-10-CM

## 2018-05-29 PROCEDURE — 87070 CULTURE OTHR SPECIMN AEROBIC: CPT | Performed by: INTERNAL MEDICINE

## 2018-05-29 PROCEDURE — 87077 CULTURE AEROBIC IDENTIFY: CPT | Performed by: INTERNAL MEDICINE

## 2018-05-29 PROCEDURE — 87186 SC STD MICRODIL/AGAR DIL: CPT | Performed by: INTERNAL MEDICINE

## 2018-05-29 RX ORDER — METHYLPREDNISOLONE 4 MG/1
TABLET ORAL
Qty: 1 DOSE PACK | Refills: 0 | Status: SHIPPED | OUTPATIENT
Start: 2018-05-29 | End: 2018-08-17

## 2018-05-29 RX ORDER — AZITHROMYCIN 500 MG/1
500 TABLET, FILM COATED ORAL DAILY
Qty: 10 TAB | Refills: 0 | Status: SHIPPED | OUTPATIENT
Start: 2018-05-29 | End: 2018-06-08

## 2018-05-29 RX ORDER — LEVALBUTEROL INHALATION SOLUTION 1.25 MG/3ML
1.25 SOLUTION RESPIRATORY (INHALATION)
Qty: 3 ML | Refills: 0
Start: 2018-05-29 | End: 2018-05-29

## 2018-05-29 RX ORDER — LEVOTHYROXINE SODIUM 100 UG/1
100 TABLET ORAL
Qty: 90 TAB | Refills: 3 | Status: SHIPPED | OUTPATIENT
Start: 2018-05-29

## 2018-05-29 RX ORDER — DEXAMETHASONE SODIUM PHOSPHATE 4 MG/ML
4 INJECTION, SOLUTION INTRA-ARTICULAR; INTRALESIONAL; INTRAMUSCULAR; INTRAVENOUS; SOFT TISSUE ONCE
Qty: 1 VIAL | Refills: 0
Start: 2018-05-29 | End: 2018-05-29

## 2018-05-29 RX ORDER — HYDROCODONE POLISTIREX AND CHLORPHENIRAMINE POLISTIREX 10; 8 MG/5ML; MG/5ML
5 SUSPENSION, EXTENDED RELEASE ORAL
Qty: 120 ML | Refills: 0 | Status: SHIPPED | OUTPATIENT
Start: 2018-05-29 | End: 2018-07-13 | Stop reason: SDUPTHER

## 2018-05-29 NOTE — MR AVS SNAPSHOT
Lou Grimaldo 
 
 
 [address] [facility] [address] [phone] Patient: Nick Zayas MRN: [id number] YCP:[birth year] Visit Information Date & Time Provider Department Dept. Phone Encounter #  
 5/29/2018  8:00 AM Catha Shone, [employer] [phone] [id number] Follow-up Instructions Return in about 3 days (around 6/1/2018), or 1:30 PM. Your Appointments 6/12/2018 11:00 AM  
Follow Up with Catha Shone, MD  
[employer] [facility]: Renew Prescription; r/s for 1 month f/u appt [address] [facility] [id number] [phone]  
  
   
 [address] [place] [address] Upcoming Health Maintenance Date Due Hepatitis C Screening 1945 COLONOSCOPY 5/2/1963 ZOSTER VACCINE AGE 60> 3/2/2005 MEDICARE YEARLY EXAM 3/28/2018 Influenza Age 5 to Adult 8/1/2018 BREAST CANCER SCRN MAMMOGRAM 6/13/2019 DTaP/Tdap/Td series (2 - Td) 3/4/2020 GLAUCOMA SCREENING Q2Y 4/30/2020 Allergies as of 5/29/2018  Review Complete On: 5/29/2018 By: Catha Shone, MD  
  
 Severity Noted Reaction Type Reactions Amoxicillin  08/23/2010    Unknown (comments) Avelox [Moxifloxacin]  08/23/2010    Unknown (comments) Celexa [Citalopram]  08/23/2010    Unknown (comments) Ciprofloxacin  08/23/2010    Unknown (comments) Doxycycline  01/17/2017    Nausea and Vomiting Levaquin [Levofloxacin]  08/23/2010    Unknown (comments) Paxil [Paroxetine Hcl]  08/23/2010    Unknown (comments) Sulfa (Sulfonamide Antibiotics)  08/23/2010    Unknown (comments) Joints ache Tequin [Gatifloxacin]  08/23/2010    Unknown (comments) Current Immunizations  Reviewed on 10/2/2017 Name Date Influenza High Dose Vaccine PF 10/2/2017 11:24 AM, 10/31/2016  2:06 PM, 10/20/2015 Influenza Vaccine 10/2/2017 12:00 AM, 10/22/2013, 9/1/2009 12:00 AM  
 Influenza Vaccine (Quad) 11/24/2014 12:17 PM  
 Influenza Vaccine Split 11/16/2011 Pneumococcal Polysaccharide (PPSV-23) 8/1/2013 12:00 AM  
 Tdap 3/4/2010 12:00 AM  
  
 Not reviewed this visit You Were Diagnosed With   
  
 Codes Comments COPD exacerbation ([facility]    -  Primary ICD-10-CM: J44.1 ICD-9-CM: 491.21 Orbital fracture, closed, initial encounter [facility])     ICD-10-CM: U48.94QY ICD-9-CM: 802.8 Chronic cough     ICD-10-CM: R05 ICD-9-CM: 153. 2 Vitals BP Pulse Temp Resp Height(growth percentile) Weight(growth percentile) 130/54 (BP 1 Location: Right arm, BP Patient Position: Sitting) (!) 113 97.3 °F (36.3 °C) (Oral) 22 5' 4\" (1.626 m) 126 lb (57.2 kg) BMI OB Status Smoking Status 21.63 kg/m2 Postmenopausal Never Smoker BMI and BSA Data Body Mass Index Body Surface Area  
 21.63 kg/m 2 1.61 m 2 Preferred Pharmacy Pharmacy Name Phone [facility] [address] [facility] [address], [address] [phone] Your Updated Medication List  
  
   
This list is accurate as of 5/29/18  9:33 AM.  Always use your most recent med list.  
  
  
  
  
 acetylcysteine 100 mg/mL (10 %) nebulizer solution Commonly known as:  MUCOMYST Take 4 mL by inhalation two (2) times a day. albuterol-ipratropium 2.5 mg-0.5 mg/3 ml Nebu Commonly known as:  DUO-NEB  
3 mL by Nebulization route every six (6) hours as needed. Use 1 vial t.i.d. Via nebulizer prn  
  
 azithromycin 500 mg Tab Commonly known as:  Morrie Cables Take 1 Tab by mouth daily for 10 days. benzonatate 100 mg capsule Commonly known as:  TESSALON Take 1 Cap by mouth every eight (8) hours as needed. * budesonide 0.5 mg/2 mL Nbsp Commonly known as:  PULMICORT  
2 mL by Nebulization route two (2) times a day. * budesonide 1 mg/2 mL Nbsp Commonly known as:  PULMICORT  
2 mL by Nebulization route two (2) times a day. CALTRATE 600+D PLUS MINERALS 600 mg calcium- 400 unit Tab Generic drug:  Calcium Carbonate-Vit D3-Min Take  by mouth daily. CENTRUM SILVER Tab tablet Generic drug:  multivitamins-minerals-lutein Take  by mouth daily. * chlorpheniramine-HYDROcodone 10-8 mg/5 mL suspension Commonly known as:  Aldoe Shady Take 5 mL by mouth every twelve (12) hours as needed for Cough. Max Daily Amount: 10 mL. * chlorpheniramine-HYDROcodone 10-8 mg/5 mL suspension Commonly known as:  Marijane Shady Take 5 mL by mouth every twelve (12) hours as needed for Cough. Max Daily Amount: 10 mL. clonazePAM 0.5 mg tablet Commonly known as:  Gloria Cord Take 1 Tab by mouth two (2) times a day. Max Daily Amount: 1 mg.  
  
 dexamethasone 4 mg/mL injection Commonly known as:  DECADRON  
1 mL by IntraMUSCular route once for 1 dose. guaiFENesin 100 mg/5 mL liquid Commonly known as:  ROBITUSSIN Take 100 mg by mouth every six (6) hours as needed. HYDROcodone-acetaminophen  mg tablet Commonly known as:  Kathy Huston Take 1 Tab by mouth every eight (8) hours as needed. hydrOXYzine HCl 50 mg tablet Commonly known as:  ATARAX Take 1 Tab by mouth every eight (8) hours as needed. itching  
  
 latanoprost 0.005 % ophthalmic solution Commonly known as:  Jessie Aly Administer 1 Drop to both eyes nightly. levalbuterol 1.25 mg/3 mL Nebu Commonly known as:  XOPENEX  
3 mL by Nebulization route now for 1 dose. levothyroxine 100 mcg tablet Commonly known as:  SYNTHROID Take 1 Tab by mouth Daily (before breakfast). * methylPREDNISolone 4 mg tablet Commonly known as:  Albion Chess Start Thursday * methylPREDNISolone 4 mg tablet Commonly known as:  Albion Chess Start on Wednesday  
  
 metoprolol tartrate 25 mg tablet Commonly known as:  LOPRESSOR  
 Take 1 Tab by mouth two (2) times a day.  
  
 naloxone 2 mg/actuation Spry Use 1 spray intranasally into 1 nostril. Use a new Narcan nasal spray for subsequent doses and administer into alternating nostrils. May repeat every 2 to 3 minutes as needed. OLANZapine 5 mg tablet Commonly known as:  ZyPREXA Take 5 mg by mouth nightly. pilocarpine 5 mg tablet Commonly known as:  SALAGEN (PILOCARPINE) Take 1 Tab by mouth two (2) times a day. traZODone 50 mg tablet Commonly known as:  Nubia Faes Take 50 mg by mouth nightly. VITAMIN D3 1,000 unit Cap Generic drug:  cholecalciferol Take  by mouth two (2) times a day. ZONALON, PRUDOXIN 5 % topical cream  
Commonly known as:  doxepin (ZONALON, PRUDOXIN) 5% cream  
APPLY EXTERNALLY TO THE AFFECTED AREA THREE TIMES DAILY * Notice: This list has 6 medication(s) that are the same as other medications prescribed for you. Read the directions carefully, and ask your doctor or other care provider to review them with you. Prescriptions Printed Refills  
 chlorpheniramine-HYDROcodone (TUSSIONEX) 10-8 mg/5 mL suspension 0 Sig: Take 5 mL by mouth every twelve (12) hours as needed for Cough. Max Daily Amount: 10 mL. Class: Print Route: Oral  
  
Prescriptions Sent to Pharmacy Refills  
 azithromycin (ZITHROMAX) 500 mg tab 0 Sig: Take 1 Tab by mouth daily for 10 days. Class: Normal  
 [facility] [address] [place] [address] Rd, [address] Ph #: [phone] Route: Oral  
 methylPREDNISolone (MEDROL DOSEPACK) 4 mg tablet 0 Sig: Start on Wednesday Class: Normal  
 Pharmacy: [facility] Ph #: [phone]  
 levothyroxine (SYNTHROID) 100 mcg tablet 3 Sig: Take 1 Tab by mouth Daily (before breakfast).   
 Class: Normal  
 Pharmacy: [facility] [address] [facility] [address], [address] #: [phone] Route: Oral  
  
We Performed the Following DEXAMETHASONE SODIUM PHOSPHATE INJECTION 1 MG [ [facility]] INHAL RX, AIRWAY OBST/DX SPUTUM INDUCT [id number] CPT(R)] LEVALBUTEROL, INHAL. SOL., FDA-APPROVED FINAL, NON-COMPOUND UNIT DOSE, 0.5 MG [ [facility]] [place] PRESSURIZED/NONPRESSURIZED INHALATION TREATMENT [id number] CPT(R)] [place] THER/PROPH/DIAG INJECTION, SUBCUT/IM [id number] CPT(R)] Follow-up Instructions Return in about 3 days (around 6/1/2018), or 1:30 PM. To-Do List   
 05/29/2018 Imaging:  XR CHEST PA LAT Introducing [facility]! Dear Nimesh Flroes: Thank you for requesting a ShopYourWorld account. Our records indicate that you already have an active ShopYourWorld account. You can access your account anytime at [email or web address] Did you know that you can access your hospital and ER discharge instructions at any time in ShopYourWorld? You can also review all of your test results from your hospital stay or ER visit. Additional Information If you have questions, please visit the Frequently Asked Questions section of the ShopYourWorld website at [email or web address] Remember, ShopYourWorld is NOT to be used for urgent needs. For medical emergencies, dial 911. Now available from your iPhone and Android! Please provide this summary of care documentation to your next provider. Your primary care clinician is listed as Caitlin Hall. If you have any questions after today's visit, please call [phone].

## 2018-05-29 NOTE — PROGRESS NOTES
Levar Ochoa is a 68 y.o. female (: 1945) presenting to address:    Chief Complaint   Patient presents with    Cough     patient stated she has been coughing for one week, cough causes chest and back discomfort       pan scale 0/10       Vitals:    18 0809   BP: 130/54   Pulse: (!) 113   Resp: 22   Temp: 97.3 °F (36.3 °C)   TempSrc: Oral   Weight: 126 lb (57.2 kg)   Height: 5' 4\" (1.626 m)   PainSc:   0 - No pain       Hearing/Vision:   No exam data present    Learning Assessment:     Learning Assessment 2014   PRIMARY LEARNER Patient   HIGHEST LEVEL OF EDUCATION - PRIMARY LEARNER  GRADUATED HIGH SCHOOL OR GED   BARRIERS PRIMARY LEARNER NONE   CO-LEARNER CAREGIVER No   PRIMARY LANGUAGE ENGLISH   LEARNER PREFERENCE PRIMARY OTHER (COMMENT)   ANSWERED BY Patient   RELATIONSHIP SELF     Depression Screening:     PHQ over the last two weeks 3/4/2014   Little interest or pleasure in doing things Nearly every day   Feeling down, depressed or hopeless Nearly every day   Total Score PHQ 2 6   Trouble falling or staying asleep, or sleeping too much Nearly every day   Feeling tired or having little energy Not at all   Poor appetite or overeating Several days   Feeling bad about yourself - or that you are a failure or have let yourself or your family down Nearly every day   Trouble concentrating on things such as school, work, reading or watching TV Nearly every day   Moving or speaking so slowly that other people could have noticed; or the opposite being so fidgety that others notice Nearly every day   Thoughts of being better off dead, or hurting yourself in some way Nearly every day   How difficult have these problems made it for you to do your work, take care of your home and get along with others Extremely difficult     Fall Risk Assessment:     Fall Risk Assessment, last 12 mths 2018   Able to walk? Yes   Fall in past 12 months?  No   Fall with injury? -   Number of falls in past 12 months -   Fall Risk Score -     Abuse Screening:   No flowsheet data found. Coordination of Care Questionaire:   1. Have you been to the ER, urgent care clinic since your last visit? Hospitalized since your last visit? NO    2. Have you seen or consulted any other health care providers outside of the [facility] since your last visit? Include any pap smears or colon screening. NO    Advanced Directive:   1. Do you have an Advanced Directive? YES    2. Would you like information on Advanced Directives?  NO

## 2018-05-29 NOTE — PROGRESS NOTES
HISTORY OF PRESENT ILLNESS  Levar Ochoa is a 68 y.o. female. HPI  Copd with recent exacerbation  A/w r sided chest pain, pleuritic  A/w purulent sputum  Not a/w fever, sweats  Minimal relief in past 2 weeks with antibiotics  Orbital fracture was ruled out, issue resolved  Review of Systems   Respiratory: Positive for cough and sputum production. Cardiovascular: Positive for chest pain. All other systems reviewed and are negative. Past Medical History:   Diagnosis Date    Arm DVT (deep venous thromboembolism), acute, right ([facility] 4/10/2018    Bronchiolitis     Closed fracture of orbit with routine healing 4/10/2018    Cough     GERD (gastroesophageal reflux disease)     went for surgery for it    Hypertension     JRA (juvenile rheumatoid arthritis) ([facility]     Orbital fracture, closed, initial encounter ([facility] 4/10/2018    Osteomyel[facility] of left humerus ([facility] 4/10/2018       Current Outpatient Prescriptions:     methylPREDNISolone (MEDROL DOSEPACK) 4 mg tablet, Start Thursday, Disp: 1 Dose Pack, Rfl: 0    chlorpheniramine-HYDROcodone (TUSSIONEX) 10-8 mg/5 mL suspension, Take 5 mL by mouth every twelve (12) hours as needed for Cough. Max Daily Amount: 10 mL., Disp: 120 mL, Rfl: 0    hydrOXYzine HCl (ATARAX) 50 mg tablet, Take 1 Tab by mouth every eight (8) hours as needed. itching, Disp: 60 Tab, Rfl: 1    ZONALON, PRUDOXIN (DOXEPIN, ZONALON, PRUDOXIN, 5% CREAM) 5 % topical cream, APPLY EXTERNALLY TO THE AFFECTED AREA THREE TIMES DAILY, Disp: 45 g, Rfl: 0    naloxone 2 mg/actuation spry, Use 1 spray intranasally into 1 nostril. Use a new Narcan nasal spray for subsequent doses and administer into alternating nostrils. May repeat every 2 to 3 minutes as needed. , Disp: 1 Device, Rfl: 0    benzonatate (TESSALON) 100 mg capsule, Take 1 Cap by mouth every eight (8) hours as needed. , Disp: 30 Cap, Rfl: 0    pilocarpine (SALAGEN, PILOCARPINE,) 5 mg tablet, Take 1 Tab by mouth two (2) times a day., Disp: 60 Tab, Rfl: 0    metoprolol tartrate (LOPRESSOR) 25 mg tablet, Take 1 Tab by mouth two (2) times a day., Disp: 180 Tab, Rfl: 0    guaiFENesin (ROBITUSSIN) 100 mg/5 mL liquid, Take 100 mg by mouth every six (6) hours as needed. , Disp: , Rfl:     latanoprost (XALATAN) 0.005 % ophthalmic solution, Administer 1 Drop to both eyes nightly., Disp: , Rfl:     OLANZapine (ZYPREXA) 5 mg tablet, Take 5 mg by mouth nightly., Disp: , Rfl:     traZODone (DESYREL) 50 mg tablet, Take 50 mg by mouth nightly., Disp: , Rfl:     HYDROcodone-acetaminophen (NORCO)  mg tablet, Take 1 Tab by mouth every eight (8) hours as needed. , Disp: , Rfl:     budesonide (PULMICORT) 1 mg/2 mL nbsp, 2 mL by Nebulization route two (2) times a day., Disp: 60 Each, Rfl: 1    chlorpheniramine-HYDROcodone (TUSSIONEX) 10-8 mg/5 mL suspension, Take 5 mL by mouth every twelve (12) hours as needed for Cough. Max Daily Amount: 10 mL., Disp: 200 mL, Rfl: 0    acetylcysteine (MUCOMYST) 100 mg/mL (10 %) nebulizer solution, Take 4 mL by inhalation two (2) times a day., Disp: 120 mL, Rfl: 0    albuterol-ipratropium (DUO-NEB) 2.5 mg-0.5 mg/3 ml nebu, 3 mL by Nebulization route every six (6) hours as needed. Use 1 vial t.i.d. Via nebulizer prn, Disp: 360 Nebule, Rfl: 1    clonazePAM (KLONOPIN) 0.5 mg tablet, Take 1 Tab by mouth two (2) times a day. Max Daily Amount: 1 mg., Disp: 30 Tab, Rfl: 0    cholecalciferol (VITAMIN D3) 1,000 unit cap, Take  by mouth two (2) times a day., Disp: , Rfl:     budesonide (PULMICORT) 0.5 mg/2 mL nbsp, 2 mL by Nebulization route two (2) times a day. (Patient taking differently: 500 mcg by Nebulization route two (2) times daily as needed.), Disp: 60 Each, Rfl: 3    levothyroxine (SYNTHROID) 100 mcg tablet, Take 1 Tab by mouth Daily (before breakfast). , Disp: 90 Tab, Rfl: 3    Calcium Carbonate-Vit D3-Min (CALTRATE 600+D PLUS MINERALS) 600 mg calcium- 400 unit Tab, Take  by mouth daily. , Disp: , Rfl:    multivitamins-minerals-lutein (CENTRUM SILVER) Tab, Take  by mouth daily. , Disp: , Rfl:   Visit Vitals    /54 (BP 1 Location: Right arm, BP Patient Position: Sitting)    Pulse (!) 113    Temp 97.3 °F (36.3 °C) (Oral)    Resp 22    Ht 5' 4\" (1.626 m)    Wt 126 lb (57.2 kg)    BMI 21.63 kg/m2         Physical Exam   Constitutional: She appears well-developed. No distress. Cardiovascular: Normal rate, regular rhythm and intact distal pulses. Exam reveals no gallop and no friction rub. No murmur heard. Pulmonary/Chest: She is in respiratory distress. She has wheezes. She has no rales. She exhibits no tenderness. Insp/exp wheezes x 2  Prolonged exp phase  Diminished breath sounds   Musculoskeletal: She exhibits no edema, tenderness or deformity. - spinal or rib cage tenderness   Skin: She is not diaphoretic. Vitals reviewed.   cxr- chronic volume loss on right with parenchymal opacities forming in RLL   ASSESSMENT and PLAN  exacerbation copd, r/o pneumonia  Plan  HHN  Sputum cx    Addendum:  Mild improvement after HHN  O2 85%  Decadron 4 mg im  Medrol  Start Mucomyst BID  duoneb 3-4 times daily

## 2018-06-01 ENCOUNTER — TELEPHONE (OUTPATIENT)
Dept: FAMILY MEDICINE CLINIC | Age: 73
End: 2018-06-01

## 2018-06-01 ENCOUNTER — OFFICE VISIT (OUTPATIENT)
Dept: FAMILY MEDICINE CLINIC | Age: 73
End: 2018-06-01

## 2018-06-01 VITALS
OXYGEN SATURATION: 92 % | SYSTOLIC BLOOD PRESSURE: 172 MMHG | HEART RATE: 97 BPM | TEMPERATURE: 97.8 F | HEIGHT: 64 IN | RESPIRATION RATE: 18 BRPM | BODY MASS INDEX: 20.83 KG/M2 | DIASTOLIC BLOOD PRESSURE: 90 MMHG | WEIGHT: 122 LBS

## 2018-06-01 DIAGNOSIS — J18.9 PNEUMONITIS: Primary | ICD-10-CM

## 2018-06-01 DIAGNOSIS — J15.1 PNEUMONIA DUE TO PSEUDOMONAS SPECIES, UNSPECIFIED LATERALITY, UNSPECIFIED PART OF LUNG (HCC): Primary | ICD-10-CM

## 2018-06-01 LAB
ABSOLUTE LYMPHOCYTE COUNT, 10803: 2.5 K/UL (ref 1–4.8)
ANION GAP SERPL CALC-SCNC: 12 MMOL/L
BACTERIA SPEC CULT: ABNORMAL
BACTERIA SPEC CULT: ABNORMAL
BASOPHILS # BLD: 0 K/UL (ref 0–0.2)
BASOPHILS NFR BLD: 0 % (ref 0–2)
BUN SERPL-MCNC: 29 MG/DL (ref 6–22)
CALCIUM SERPL-MCNC: 9.5 MG/DL (ref 8.4–10.5)
CHLORIDE SERPL-SCNC: 100 MMOL/L (ref 98–110)
CO2 SERPL-SCNC: 26 MMOL/L (ref 20–32)
CREAT SERPL-MCNC: 0.8 MG/DL (ref 0.8–1.4)
EOSINOPHIL # BLD: 0 K/UL (ref 0–0.5)
EOSINOPHIL NFR BLD: 0 % (ref 0–6)
ERYTHROCYTE [DISTWIDTH] IN BLOOD BY AUTOMATED COUNT: 13.1 % (ref 10–15.5)
GFRAA, 66117: >60
GFRNA, 66118: >60
GLUCOSE SERPL-MCNC: 89 MG/DL (ref 70–99)
GRAM STN SPEC: ABNORMAL
GRANULOCYTES,GRANS: 75 % (ref 40–75)
HCT VFR BLD AUTO: 40.3 % (ref 35.1–48.3)
HGB BLD-MCNC: 13.2 G/DL (ref 11.7–16.1)
LYMPHOCYTES, LYMLT: 16 % (ref 20–45)
MCH RBC QN AUTO: 29 PG (ref 26–34)
MCHC RBC AUTO-ENTMCNC: 33 G/DL (ref 31–36)
MCV RBC AUTO: 88 FL (ref 80–95)
MONOCYTES # BLD: 1.3 K/UL (ref 0.1–1)
MONOCYTES NFR BLD: 9 % (ref 3–12)
NEUTROPHILS # BLD AUTO: 11.5 K/UL (ref 1.8–7.7)
PLATELET # BLD AUTO: 566 K/UL (ref 140–440)
PMV BLD AUTO: 10.4 FL (ref 9–13)
POTASSIUM SERPL-SCNC: 4.2 MMOL/L (ref 3.5–5.5)
RBC # BLD AUTO: 4.58 M/UL (ref 3.8–5.2)
SERVICE CMNT-IMP: ABNORMAL
SODIUM SERPL-SCNC: 138 MMOL/L (ref 133–145)
WBC # BLD AUTO: 15.4 K/UL (ref 4–11)

## 2018-06-01 NOTE — MR AVS SNAPSHOT
[address] [facility] [address] 
 
 
 [address] [facility] [id number] [phone] Patient: Levar Ochoa MRN: [id number] WDN:[id number] Visit Information Date & Time Provider Department Dept. Phone Encounter #  
 6/1/2018  2:00 PM Hanna Meyers  [facility] [phone] Follow-up Instructions Return in about 3 weeks (around 6/22/2018). Follow-up and Disposition History Your Appointments 6/12/2018 11:00 AM  
Follow Up with Hanna Meyers MD  
[address] [facility] [address] [facility]) Appt Note: Renew Prescription; r/s for 1 month f/u appt [address] [facility] [id number] [phone]  
  
   
 [address] Upcoming Health Maintenance Date Due Hepatitis C Screening 1945 COLONOSCOPY 5/2/1963 ZOSTER VACCINE AGE 60> 3/2/2005 MEDICARE YEARLY EXAM 3/28/2018 Influenza Age 5 to Adult 8/1/2018 BREAST CANCER SCRN MAMMOGRAM 6/13/2019 DTaP/Tdap/Td series (2 - Td) 3/4/2020 GLAUCOMA SCREENING Q2Y 4/30/2020 Allergies as of 6/1/2018  Review Complete On: 6/1/2018 By: Hanna Meyers MD  
  
 Severity Noted Reaction Type Reactions Amoxicillin  08/23/2010    Unknown (comments) Avelox [Moxifloxacin]  08/23/2010    Unknown (comments) Celexa [Citalopram]  08/23/2010    Unknown (comments) Ciprofloxacin  08/23/2010    Unknown (comments) Doxycycline  01/17/2017    Nausea and Vomiting Levaquin [Levofloxacin]  08/23/2010    Unknown (comments) Paxil [Paroxetine Hcl]  08/23/2010    Unknown (comments) Sulfa (Sulfonamide Antibiotics)  08/23/2010    Unknown (comments) Joints ache Tequin [Gatifloxacin]  08/23/2010    Unknown (comments) Current Immunizations  Reviewed on 10/2/2017 Name Date  Influenza High Dose Vaccine PF 10/2/2017 11:24 AM, 10/31/2016  2:06 PM, 10/20/2015 Influenza Vaccine 10/2/2017 12:00 AM, 10/22/2013, 9/1/2009 12:00 AM  
 Influenza Vaccine (Quad) 11/24/2014 12:17 PM  
 Influenza Vaccine Split 11/16/2011 Pneumococcal Polysaccharide (PPSV-23) 8/1/2013 12:00 AM  
 Tdap 3/4/2010 12:00 AM  
  
 Not reviewed this visit You Were Diagnosed With   
  
 Codes Comments Pneumonia due to Pseudomonas species, unspecified laterality, unspecified part of lung ([facility]    -  Primary ICD-10-CM: J15.1 ICD-9-CM: 237. 1 Vitals BP Pulse Temp Resp Height(growth percentile) Weight(growth percentile) 172/90 (BP 1 Location: Right arm, BP Patient Position: Sitting) 97 97.8 °F (36.6 °C) (Oral) 18 5' 4\" (1.626 m) 122 lb (55.3 kg) SpO2 BMI OB Status Smoking Status 92% 20.94 kg/m2 Postmenopausal Never Smoker BMI and BSA Data Body Mass Index Body Surface Area  
 20.94 kg/m 2 1.58 m 2 Preferred Pharmacy Pharmacy Name Phone [facility] [address], [address] [phone] Your Updated Medication List  
  
   
This list is accurate as of 6/1/18  3:22 PM.  Always use your most recent med list.  
  
  
  
  
 acetylcysteine 100 mg/mL (10 %) nebulizer solution Commonly known as:  MUCOMYST Take 4 mL by inhalation two (2) times a day. albuterol-ipratropium 2.5 mg-0.5 mg/3 ml Nebu Commonly known as:  DUO-NEB  
3 mL by Nebulization route every six (6) hours as needed. Use 1 vial t.i.d. Via nebulizer prn  
  
 azithromycin 500 mg Tab Commonly known as:  Gunjan Croak Take 1 Tab by mouth daily for 10 days. benzonatate 100 mg capsule Commonly known as:  TESSALON Take 1 Cap by mouth every eight (8) hours as needed. * budesonide 0.5 mg/2 mL Nbsp Commonly known as:  PULMICORT  
2 mL by Nebulization route two (2) times a day. * budesonide 1 mg/2 mL Nbsp Commonly known as:  PULMICORT  
 2 mL by Nebulization route two (2) times a day. CALTRATE 600+D PLUS MINERALS 600 mg calcium- 400 unit Tab Generic drug:  Calcium Carbonate-Vit D3-Min Take  by mouth daily. CENTRUM SILVER Tab tablet Generic drug:  multivitamins-minerals-lutein Take  by mouth daily. * chlorpheniramine-HYDROcodone 10-8 mg/5 mL suspension Commonly known as:  Mandie Mitchell Take 5 mL by mouth every twelve (12) hours as needed for Cough. Max Daily Amount: 10 mL. * chlorpheniramine-HYDROcodone 10-8 mg/5 mL suspension Commonly known as:  Mandie Mitchell Take 5 mL by mouth every twelve (12) hours as needed for Cough. Max Daily Amount: 10 mL. clonazePAM 0.5 mg tablet Commonly known as:  Bipin Limon Take 1 Tab by mouth two (2) times a day. Max Daily Amount: 1 mg.  
  
 guaiFENesin 100 mg/5 mL liquid Commonly known as:  ROBITUSSIN Take 100 mg by mouth every six (6) hours as needed. HYDROcodone-acetaminophen  mg tablet Commonly known as:  [place] Kulwant Take 1 Tab by mouth every eight (8) hours as needed. hydrOXYzine HCl 50 mg tablet Commonly known as:  ATARAX Take 1 Tab by mouth every eight (8) hours as needed. itching  
  
 latanoprost 0.005 % ophthalmic solution Commonly known as:  Serjio Perez Administer 1 Drop to both eyes nightly. levothyroxine 100 mcg tablet Commonly known as:  SYNTHROID Take 1 Tab by mouth Daily (before breakfast). * methylPREDNISolone 4 mg tablet Commonly known as:  Chris Click Start Thursday * methylPREDNISolone 4 mg tablet Commonly known as:  Chris Click Start on Wednesday  
  
 metoprolol tartrate 25 mg tablet Commonly known as:  LOPRESSOR Take 1 Tab by mouth two (2) times a day.  
  
 naloxone 2 mg/actuation Spry Use 1 spray intranasally into 1 nostril. Use a new Narcan nasal spray for subsequent doses and administer into alternating nostrils. May repeat every 2 to 3 minutes as needed. OLANZapine 5 mg tablet Commonly known as:  ZyPREXA Take 5 mg by mouth nightly. pilocarpine 5 mg tablet Commonly known as:  SALAGEN (PILOCARPINE) Take 1 Tab by mouth two (2) times a day. traZODone 50 mg tablet Commonly known as:  Portillo Bloodgood Take 50 mg by mouth nightly. VITAMIN D3 1,000 unit Cap Generic drug:  cholecalciferol Take  by mouth two (2) times a day. ZONALON, PRUDOXIN 5 % topical cream  
Commonly known as:  doxepin (ZONALON, PRUDOXIN) 5% cream  
APPLY EXTERNALLY TO THE AFFECTED AREA THREE TIMES DAILY * Notice: This list has 6 medication(s) that are the same as other medications prescribed for you. Read the directions carefully, and ask your doctor or other care provider to review them with you. We Performed the Following [address] [place] [address] Comments:  
 [facility] for IV antibiotic therapy REFERRAL TO INTERVENTIONAL RADIOLOGY [IMD96 Custom] Comments:  
 Korey PICC line placement Follow-up Instructions Return in about 3 weeks (around 6/22/2018). Referral Information Referral ID Referred By Referred To  
  
 [id number] Leo Hackett Not Available Visits Status Start Date End Date 1 New Request 6/1/18 6/1/19 If your referral has a status of pending review or denied, additional information will be sent to support the outcome of this decision. Referral ID Referred By Referred To  
 [id number] Leo Hackett Not Available Visits Status Start Date End Date 1 New Request 6/1/18 6/1/19 If your referral has a status of pending review or denied, additional information will be sent to support the outcome of this decision. Introducing hospitals & HEALTH SERVICES! Dear Lavell Matter: Thank you for requesting a [employer] account. Our records indicate that you already have an active [employer] account. You can access your account anytime at [email or web address] Did you know that you can access your hospital and ER discharge instructions at any time in [employer]? You can also review all of your test results from your hospital stay or ER visit. Additional Information If you have questions, please visit the Frequently Asked Questions section of the [employer] website at [email or web address] Remember, [employer] is NOT to be used for urgent needs. For medical emergencies, dial 911. Now available from your iPhone and Android! Please provide this summary of care documentation to your next provider. Your primary care clinician is listed as Alphonso Mcarthur. If you have any questions after today's visit, please call [phone].

## 2018-06-01 NOTE — PROGRESS NOTES
Call, culture + for pseudomonas  zithromax may not be helping  Would she consider trying cipro again  Otherwise may need IV antibiotics

## 2018-06-01 NOTE — TELEPHONE ENCOUNTER
[facility] called regarding a fax they received on pt. The fax needs to include information of single or double lumen and power pick or non power pick. It was stated that this information could be hand written on form or typed. Pt has an appt with them on next Wednesday, 6/6/18. A call back number was not left due to them stating that is the only information needed. Please advise.     Fax: [phone]

## 2018-06-01 NOTE — PROGRESS NOTES
Ryan Harris is a 68 y.o. female (: 1945) presenting to address:f/u 3 days    Chief Complaint   Patient presents with    Follow-up     3 day f/u       Vitals:    18 1358   BP: 172/90   Pulse: 97   Resp: 18   Temp: 97.8 °F (36.6 °C)   TempSrc: Oral   SpO2: 92%   Weight: 122 lb (55.3 kg)   Height: 5' 4\" (1.626 m)   PainSc:   0 - No pain       Hearing/Vision:   No exam data present    Learning Assessment:     Learning Assessment 2014   PRIMARY LEARNER Patient   HIGHEST LEVEL OF EDUCATION - PRIMARY LEARNER  GRADUATED HIGH SCHOOL OR GED   BARRIERS PRIMARY LEARNER NONE   CO-LEARNER CAREGIVER No   PRIMARY LANGUAGE ENGLISH   LEARNER PREFERENCE PRIMARY OTHER (COMMENT)   ANSWERED BY Patient   RELATIONSHIP SELF     Depression Screening:     PHQ over the last two weeks 3/4/2014   Little interest or pleasure in doing things Nearly every day   Feeling down, depressed or hopeless Nearly every day   Total Score PHQ 2 6   Trouble falling or staying asleep, or sleeping too much Nearly every day   Feeling tired or having little energy Not at all   Poor appetite or overeating Several days   Feeling bad about yourself - or that you are a failure or have let yourself or your family down Nearly every day   Trouble concentrating on things such as school, work, reading or watching TV Nearly every day   Moving or speaking so slowly that other people could have noticed; or the opposite being so fidgety that others notice Nearly every day   Thoughts of being better off dead, or hurting yourself in some way Nearly every day   How difficult have these problems made it for you to do your work, take care of your home and get along with others Extremely difficult     Fall Risk Assessment:     Fall Risk Assessment, last 12 mths 2018   Able to walk? Yes   Fall in past 12 months? No   Fall with injury? -   Number of falls in past 12 months -   Fall Risk Score -     Abuse Screening:   No flowsheet data found.   Coordination of Care Questionaire:   1. Have you been to the ER, urgent care clinic since your last visit? Hospitalized since your last visit? no    2. Have you seen or consulted any other health care providers outside of the [facility] since your last visit? Include any pap smears or colon screening. no    Advanced Directive:   1. Do you have an Advanced Directive? no    2. Would you like information on Advanced Directives?  no

## 2018-06-01 NOTE — PROGRESS NOTES
HISTORY OF PRESENT ILLNESS  Eugene Farley is a 68 y.o. female. HPI  Acute bronchitis/pneumonitis unimproved  Review of Systems   Respiratory: Positive for cough and shortness of breath. All other systems reviewed and are negative. Past Medical History:   Diagnosis Date    Arm DVT (deep venous thromboembolism), acute, right ([facility] 4/10/2018    Bronchiolitis     Closed fracture of orbit with routine healing 4/10/2018    Cough     GERD (gastroesophageal reflux disease)     went for surgery for it    Hypertension     JRA (juvenile rheumatoid arthritis) [facility]     Orbital fracture, closed, initial encounter ([facility] 4/10/2018    Osteomyelitis of left humerus ([facility] 4/10/2018     Current Outpatient Prescriptions on File Prior to Visit   Medication Sig Dispense Refill    azithromycin (ZITHROMAX) 500 mg tab Take 1 Tab by mouth daily for 10 days. 10 Tab 0    methylPREDNISolone (MEDROL DOSEPACK) 4 mg tablet Start on Wednesday 1 Dose Pack 0    chlorpheniramine-HYDROcodone (TUSSIONEX) 10-8 mg/5 mL suspension Take 5 mL by mouth every twelve (12) hours as needed for Cough. Max Daily Amount: 10 mL. 120 mL 0    levothyroxine (SYNTHROID) 100 mcg tablet Take 1 Tab by mouth Daily (before breakfast). 90 Tab 3    methylPREDNISolone (MEDROL DOSEPACK) 4 mg tablet Start Thursday 1 Dose Pack 0    hydrOXYzine HCl (ATARAX) 50 mg tablet Take 1 Tab by mouth every eight (8) hours as needed. itching 60 Tab 1    ZONALON, PRUDOXIN (DOXEPIN, ZONALON, PRUDOXIN, 5% CREAM) 5 % topical cream APPLY EXTERNALLY TO THE AFFECTED AREA THREE TIMES DAILY 45 g 0    naloxone 2 mg/actuation spry Use 1 spray intranasally into 1 nostril. Use a new Narcan nasal spray for subsequent doses and administer into alternating nostrils. May repeat every 2 to 3 minutes as needed. 1 Device 0    benzonatate (TESSALON) 100 mg capsule Take 1 Cap by mouth every eight (8) hours as needed.  30 Cap 0    pilocarpine (SALAGEN, PILOCARPINE,) 5 mg tablet Take 1 Tab by mouth two (2) times a day. 60 Tab 0    metoprolol tartrate (LOPRESSOR) 25 mg tablet Take 1 Tab by mouth two (2) times a day. 180 Tab 0    guaiFENesin (ROBITUSSIN) 100 mg/5 mL liquid Take 100 mg by mouth every six (6) hours as needed.  latanoprost (XALATAN) 0.005 % ophthalmic solution Administer 1 Drop to both eyes nightly.  OLANZapine (ZYPREXA) 5 mg tablet Take 5 mg by mouth nightly.  traZODone (DESYREL) 50 mg tablet Take 50 mg by mouth nightly.  HYDROcodone-acetaminophen (NORCO)  mg tablet Take 1 Tab by mouth every eight (8) hours as needed.  budesonide (PULMICORT) 1 mg/2 mL nbsp 2 mL by Nebulization route two (2) times a day. 60 Each 1    chlorpheniramine-HYDROcodone (TUSSIONEX) 10-8 mg/5 mL suspension Take 5 mL by mouth every twelve (12) hours as needed for Cough. Max Daily Amount: 10 mL. 200 mL 0    acetylcysteine (MUCOMYST) 100 mg/mL (10 %) nebulizer solution Take 4 mL by inhalation two (2) times a day. 120 mL 0    albuterol-ipratropium (DUO-NEB) 2.5 mg-0.5 mg/3 ml nebu 3 mL by Nebulization route every six (6) hours as needed. Use 1 vial t.i.d. Via nebulizer prn 360 Nebule 1    clonazePAM (KLONOPIN) 0.5 mg tablet Take 1 Tab by mouth two (2) times a day. Max Daily Amount: 1 mg. 30 Tab 0    cholecalciferol (VITAMIN D3) 1,000 unit cap Take  by mouth two (2) times a day.  budesonide (PULMICORT) 0.5 mg/2 mL nbsp 2 mL by Nebulization route two (2) times a day. (Patient taking differently: 500 mcg by Nebulization route two (2) times daily as needed.) 60 Each 3    Calcium Carbonate-Vit D3-Min (CALTRATE 600+D PLUS MINERALS) 600 mg calcium- 400 unit Tab Take  by mouth daily.  multivitamins-minerals-lutein (CENTRUM SILVER) Tab Take  by mouth daily. No current facility-administered medications on file prior to visit.       Visit Vitals    /90 (BP 1 Location: Right arm, BP Patient Position: Sitting)    Pulse 97    Temp 97.8 °F (36.6 °C) (Oral)    Resp 18    Ht 5' 4\" (1.626 m)    Wt 122 lb (55.3 kg)    SpO2 92%    BMI 20.94 kg/m2         Physical Exam   Constitutional: She appears well-developed and well-nourished. No distress. Pulmonary/Chest: Effort normal. No respiratory distress. She has wheezes. She has rales. She exhibits no tenderness. Skin: She is not diaphoretic. Vitals reviewed.   reviewed micro  + pseudomonas, multidrug resistance    ASSESSMENT and PLAN  pseuromonas pneumonia   Plan  picc line and ceftazidime

## 2018-06-04 ENCOUNTER — TELEPHONE (OUTPATIENT)
Dept: FAMILY MEDICINE CLINIC | Age: 73
End: 2018-06-04

## 2018-06-04 NOTE — TELEPHONE ENCOUNTER
[facility] called, they have the orders to admin IV ATBs via PICC line but they need an order for the medicine that you want her to have.      Please PRINT a hard script to be faxed to [facility] [address] [facility] at [phone]

## 2018-06-04 NOTE — PROGRESS NOTES
Yes they can start it but they need an RX printed and faxed to them with the medicine you want them to admin

## 2018-06-05 ENCOUNTER — TELEPHONE (OUTPATIENT)
Dept: FAMILY MEDICINE CLINIC | Age: 73
End: 2018-06-05

## 2018-06-05 NOTE — TELEPHONE ENCOUNTER
Demetrius with [facility] called and wants to verify how long pt is supposed to be on Ceftazidime, the order states over 8 hours for 10 days but the QTY is for 30 days. Please advise.

## 2018-06-19 ENCOUNTER — TELEPHONE (OUTPATIENT)
Dept: FAMILY MEDICINE CLINIC | Age: 73
End: 2018-06-19

## 2018-06-19 NOTE — TELEPHONE ENCOUNTER
Patients  called to report that patient is still coughing, states home health nurse said she was wheezing. States she is somewhat better but she is still bringing up mucus.

## 2018-06-26 ENCOUNTER — OFFICE VISIT (OUTPATIENT)
Dept: FAMILY MEDICINE CLINIC | Age: 73
End: 2018-06-26

## 2018-06-26 VITALS
BODY MASS INDEX: 22.71 KG/M2 | RESPIRATION RATE: 18 BRPM | HEIGHT: 64 IN | WEIGHT: 133 LBS | HEART RATE: 78 BPM | TEMPERATURE: 97.6 F | DIASTOLIC BLOOD PRESSURE: 55 MMHG | OXYGEN SATURATION: 91 % | SYSTOLIC BLOOD PRESSURE: 116 MMHG

## 2018-06-26 DIAGNOSIS — J15.1 PNEUMONIA DUE TO PSEUDOMONAS SPECIES, UNSPECIFIED LATERALITY, UNSPECIFIED PART OF LUNG (HCC): Primary | ICD-10-CM

## 2018-06-26 DIAGNOSIS — J44.1 COPD EXACERBATION (HCC): ICD-10-CM

## 2018-06-26 RX ORDER — LEVALBUTEROL INHALATION SOLUTION 1.25 MG/3ML
1.25 SOLUTION RESPIRATORY (INHALATION)
Qty: 3 ML | Refills: 0 | Status: SHIPPED | OUTPATIENT
Start: 2018-06-26 | End: 2018-06-26

## 2018-06-26 RX ORDER — IPRATROPIUM BROMIDE 0.5 MG/2.5ML
0.5 SOLUTION RESPIRATORY (INHALATION)
Qty: 2.5 ML | Refills: 0 | Status: SHIPPED | OUTPATIENT
Start: 2018-06-26 | End: 2018-07-09 | Stop reason: CLARIF

## 2018-06-26 NOTE — PROGRESS NOTES
HISTORY OF PRESENT ILLNESS  Levar Ochoa is a 68 y.o. female. HPI  Pseudomonas pneumonia  Some improvement but still congested, wheezing  Review of Systems   Respiratory: Positive for cough, sputum production and wheezing. All other systems reviewed and are negative. Past Medical History:   Diagnosis Date    Arm DVT (deep venous thromboembolism), acute, right ([facility] 4/10/2018    Bronchiolitis     Closed fracture of orbit with routine healing 4/10/2018    Cough     GERD (gastroesophageal reflux disease)     went for surgery for it    Hypertension     JRA (juvenile rheumatoid arthritis) ([facility]     [facility], closed, initial encounter ([facility] 4/10/2018    [facility]omyel[facility] of [facility]us ([facility] 4/10/2018     Current Outpatient Prescriptions on File Prior to Visit   Medication Sig Dispense Refill    OTHER Ceftazidime 1 gram every 8 hours by PICC line for 10 days 90 Tab 1    methylPREDNISolone (MEDROL DOSEPACK) 4 mg tablet Start on Wednesday 1 Dose Pack 0    chlorpheniramine-HYDROcodone (TUSSIONEX) 10-8 mg/5 mL suspension Take 5 mL by mouth every twelve (12) hours as needed for Cough. Max Daily Amount: 10 mL. 120 mL 0    levothyroxine (SYNTHROID) 100 mcg tablet Take 1 Tab by mouth Daily (before breakfast). 90 Tab 3    methylPREDNISolone (MEDROL DOSEPACK) 4 mg tablet Start Thursday 1 Dose Pack 0    hydrOXYzine HCl (ATARAX) 50 mg tablet Take 1 Tab by mouth every eight (8) hours as needed. itching 60 Tab 1    ZONALON, PRUDOXIN (DOXEPIN, ZONALON, PRUDOXIN, 5% CREAM) 5 % topical cream APPLY EXTERNALLY TO THE AFFECTED AREA THREE TIMES DAILY 45 g 0    naloxone 2 mg/actuation spry Use 1 spray intranasally into 1 nostril. Use a new Narcan nasal spray for subsequent doses and administer into alternating nostrils. May repeat every 2 to 3 minutes as needed. 1 Device 0    benzonatate (TESSALON) 100 mg capsule Take 1 Cap by mouth every eight (8) hours as needed.  30 Cap 0    pilocarpine (SALAGEN, PILOCARPINE,) 5 mg tablet Take 1 Tab by mouth two (2) times a day. 60 Tab 0    metoprolol tartrate (LOPRESSOR) 25 mg tablet Take 1 Tab by mouth two (2) times a day. 180 Tab 0    guaiFENesin (ROBITUSSIN) 100 mg/5 mL liquid Take 100 mg by mouth every six (6) hours as needed.  latanoprost (XALATAN) 0.005 % ophthalmic solution Administer 1 Drop to both eyes nightly.  OLANZapine (ZYPREXA) 5 mg tablet Take 5 mg by mouth nightly.  traZODone (DESYREL) 50 mg tablet Take 50 mg by mouth nightly.  HYDROcodone-acetaminophen (NORCO)  mg tablet Take 1 Tab by mouth every eight (8) hours as needed.  budesonide (PULMICORT) 1 mg/2 mL nbsp 2 mL by Nebulization route two (2) times a day. 60 Each 1    chlorpheniramine-HYDROcodone (TUSSIONEX) 10-8 mg/5 mL suspension Take 5 mL by mouth every twelve (12) hours as needed for Cough. Max Daily Amount: 10 mL. 200 mL 0    acetylcysteine (MUCOMYST) 100 mg/mL (10 %) nebulizer solution Take 4 mL by inhalation two (2) times a day. 120 mL 0    albuterol-ipratropium (DUO-NEB) 2.5 mg-0.5 mg/3 ml nebu 3 mL by Nebulization route every six (6) hours as needed. Use 1 vial t.i.d. Via nebulizer prn 360 Nebule 1    clonazePAM (KLONOPIN) 0.5 mg tablet Take 1 Tab by mouth two (2) times a day. Max Daily Amount: 1 mg. 30 Tab 0    cholecalciferol (VITAMIN D3) 1,000 unit cap Take  by mouth two (2) times a day.  budesonide (PULMICORT) 0.5 mg/2 mL nbsp 2 mL by Nebulization route two (2) times a day. (Patient taking differently: 500 mcg by Nebulization route two (2) times daily as needed.) 60 Each 3    Calcium Carbonate-Vit D3-Min (CALTRATE 600+D PLUS MINERALS) 600 mg calcium- 400 unit Tab Take  by mouth daily.  multivitamins-minerals-lutein (CENTRUM SILVER) Tab Take  by mouth daily. No current facility-administered medications on file prior to visit.       Visit Vitals    /55 (BP 1 Location: Right arm, BP Patient Position: Sitting)    Pulse 78    Temp 97.6 °F (36.4 °C) (Oral)    Resp 18    Ht 5' 4\" (1.626 m)    Wt 133 lb (60.3 kg)    SpO2 91%    BMI 22.83 kg/m2         Physical Exam   Constitutional: She appears well-developed and well-nourished. No distress. Neck: Normal range of motion. Neck supple. No thyromegaly present. Cardiovascular: Normal rate, regular rhythm, normal heart sounds and intact distal pulses. Exam reveals no gallop and no friction rub. No murmur heard. Pulmonary/Chest: She has wheezes. ronchui  Prolonged exp phase   Lymphadenopathy:     She has no cervical adenopathy.    Skin: She is not diaphoretic.   hhn-post rx    ASSESSMENT and PLAN  pseudomonas pneumonia   Plan  Repeat cxr

## 2018-06-26 NOTE — PROGRESS NOTES
Kailey Martinez is a 68 y.o. female (: 1945) presenting to address:    Chief Complaint   Patient presents with    Pneumonia     follow up     pain scale 0/10       Vitals:    18 1042   BP: 116/55   Pulse: 78   Resp: 18   Temp: 97.6 °F (36.4 °C)   TempSrc: Oral   SpO2: 91%   Weight: 133 lb (60.3 kg)   Height: 5' 4\" (1.626 m)   PainSc:   0 - No pain       Hearing/Vision:   No exam data present    Learning Assessment:     Learning Assessment 2014   PRIMARY LEARNER Patient   HIGHEST LEVEL OF EDUCATION - PRIMARY LEARNER  GRADUATED HIGH SCHOOL OR GED   BARRIERS PRIMARY LEARNER NONE   CO-LEARNER CAREGIVER No   PRIMARY LANGUAGE ENGLISH   LEARNER PREFERENCE PRIMARY OTHER (COMMENT)   ANSWERED BY Patient   RELATIONSHIP SELF     Depression Screening:     PHQ over the last two weeks 3/4/2014   Little interest or pleasure in doing things Nearly every day   Feeling down, depressed or hopeless Nearly every day   Total Score PHQ 2 6   Trouble falling or staying asleep, or sleeping too much Nearly every day   Feeling tired or having little energy Not at all   Poor appetite or overeating Several days   Feeling bad about yourself - or that you are a failure or have let yourself or your family down Nearly every day   Trouble concentrating on things such as school, work, reading or watching TV Nearly every day   Moving or speaking so slowly that other people could have noticed; or the opposite being so fidgety that others notice Nearly every day   Thoughts of being better off dead, or hurting yourself in some way Nearly every day   How difficult have these problems made it for you to do your work, take care of your home and get along with others Extremely difficult     Fall Risk Assessment:     Fall Risk Assessment, last 12 mths 2018   Able to walk? Yes   Fall in past 12 months?  No   Fall with injury? -   Number of falls in past 12 months -   Fall Risk Score -     Abuse Screening:   No flowsheet data found. Coordination of Care Questionaire:   1. Have you been to the ER, urgent care clinic since your last visit? Hospitalized since your last visit? NO    2. Have you seen or consulted any other health care providers outside of the [address] [place] [address] since your last visit? Include any pap smears or colon screening. NO    Advanced Directive:   1. Do you have an Advanced Directive? YES    2. Would you like information on Advanced Directives?  NO

## 2018-06-26 NOTE — MR AVS SNAPSHOT
[address] [facility] [id number] [phone] Patient: Neela Sheriff MRN: [id number] Visit Information Date & Time Provider Department Dept. Phone Encounter #  
 6/26/2018 10:30 AM Samuel David, 3 [facility] [phone] [id number] Upcoming Health Maintenance Date Due Hepatitis C Screening 1945 COLONOSCOPY 5/2/1963 ZOSTER VACCINE AGE 60> 3/2/2005 Influenza Age 5 to Adult 8/1/2018 BREAST CANCER SCRN MAMMOGRAM 6/13/2019 DTaP/Tdap/Td series (2 - Td) 3/4/2020 GLAUCOMA SCREENING Q2Y 4/30/2020 Allergies as of 6/26/2018  Review Complete On: 6/26/2018 By: Samuel David MD  
  
 Severity Noted Reaction Type Reactions Amoxicillin  08/23/2010    Unknown (comments) Avelox [Moxifloxacin]  08/23/2010    Unknown (comments) Celexa [Citalopram]  08/23/2010    Unknown (comments) Ciprofloxacin  08/23/2010    Unknown (comments) Doxycycline  01/17/2017    Nausea and Vomiting Levaquin [Levofloxacin]  08/23/2010    Unknown (comments) Paxil [Paroxetine Hcl]  08/23/2010    Unknown (comments) Sulfa (Sulfonamide Antibiotics)  08/23/2010    Unknown (comments) Joints ache Tequin [Gatifloxacin]  08/23/2010    Unknown (comments) Current Immunizations  Reviewed on 10/2/2017 Name Date Influenza High Dose Vaccine PF 10/2/2017 11:24 AM, 10/31/2016  2:06 PM, 10/20/2015 Influenza Vaccine 10/2/2017 12:00 AM, 10/22/2013, 9/1/2009 12:00 AM  
 Influenza Vaccine (Quad) 11/24/2014 12:17 PM  
 Influenza Vaccine Split 11/16/2011 Pneumococcal Polysaccharide (PPSV-23) 8/1/2013 12:00 AM  
 Tdap 3/4/2010 12:00 AM  
  
 Not reviewed this visit You Were Diagnosed With   
  
 Codes Comments Pneumonia due to Pseudomonas species, unspecified laterality, unspecified part of lung ([facility]    -  Primary ICD-10-CM: J15.1 ICD-9-CM: 482.1 COPD exacerbation ([facility]     ICD-10-CM: J44.1 ICD-9-CM: 491.21 Vitals BP Pulse Temp Resp Height(growth percentile) Weight(growth percentile) 116/55 (BP 1 Location: Right arm, BP Patient Position: Sitting) 78 97.6 °F (36.4 °C) (Oral) 18 5' 4\" (1.626 m) 133 lb (60.3 kg) SpO2 BMI OB Status Smoking Status 91% 22.83 kg/m2 Postmenopausal Never Smoker BMI and BSA Data Body Mass Index Body Surface Area  
 22.83 kg/m 2 1.65 m 2 Preferred Pharmacy Pharmacy Name Phone [facility] [phone] [facility] [address], [address] [phone] Your Updated Medication List  
  
   
This list is accurate as of 6/26/18 11:43 AM.  Always use your most recent med list.  
  
  
  
  
 acetylcysteine 100 mg/mL (10 %) nebulizer solution Commonly known as:  MUCOMYST Take 4 mL by inhalation two (2) times a day. albuterol-ipratropium 2.5 mg-0.5 mg/3 ml Nebu Commonly known as:  DUO-NEB  
3 mL by Nebulization route every six (6) hours as needed. Use 1 vial t.i.d. Via nebulizer prn  
  
 benzonatate 100 mg capsule Commonly known as:  TESSALON Take 1 Cap by mouth every eight (8) hours as needed. * budesonide 0.5 mg/2 mL Nbsp Commonly known as:  PULMICORT  
2 mL by Nebulization route two (2) times a day. * budesonide 1 mg/2 mL Nbsp Commonly known as:  PULMICORT  
2 mL by Nebulization route two (2) times a day. CALTRATE 600+D PLUS MINERALS 600 mg calcium- 400 unit Tab Generic drug:  Calcium Carbonate-Vit D3-Min Take  by mouth daily. CENTRUM SILVER Tab tablet Generic drug:  multivitamins-minerals-lutein Take  by mouth daily. * chlorpheniramine-HYDROcodone 10-8 mg/5 mL suspension Commonly known as:  Sanaz Montiel Take 5 mL by mouth every twelve (12) hours as needed for Cough. Max Daily Amount: 10 mL. * chlorpheniramine-HYDROcodone 10-8 mg/5 mL suspension Commonly known as:  Alanaddisrosalino Eedn Take 5 mL by mouth every twelve (12) hours as needed for Cough. Max Daily Amount: 10 mL. clonazePAM 0.5 mg tablet Commonly known as:  Mayur Gibes Take 1 Tab by mouth two (2) times a day. Max Daily Amount: 1 mg.  
  
 guaiFENesin 100 mg/5 mL liquid Commonly known as:  ROBITUSSIN Take 100 mg by mouth every six (6) hours as needed. HYDROcodone-acetaminophen  mg tablet Commonly known as:  Hurman Grist Take 1 Tab by mouth every eight (8) hours as needed. hydrOXYzine HCl 50 mg tablet Commonly known as:  ATARAX Take 1 Tab by mouth every eight (8) hours as needed. itching  
  
 ipratropium 0.02 % Soln Commonly known as:  ATROVENT  
2.5 mL by Nebulization route now for 1 dose. latanoprost 0.005 % ophthalmic solution Commonly known as:  Marlyse Billow Administer 1 Drop to both eyes nightly. levalbuterol 1.25 mg/3 mL Nebu Commonly known as:  XOPENEX  
3 mL by Nebulization route now for 1 dose. levothyroxine 100 mcg tablet Commonly known as:  SYNTHROID Take 1 Tab by mouth Daily (before breakfast). * methylPREDNISolone 4 mg tablet Commonly known as:  Georga Deems Start Thursday * methylPREDNISolone 4 mg tablet Commonly known as:  Georga Deems Start on Wednesday  
  
 metoprolol tartrate 25 mg tablet Commonly known as:  LOPRESSOR Take 1 Tab by mouth two (2) times a day.  
  
 naloxone 2 mg/actuation Spry Use 1 spray intranasally into 1 nostril. Use a new Narcan nasal spray for subsequent doses and administer into alternating nostrils. May repeat every 2 to 3 minutes as needed. OLANZapine 5 mg tablet Commonly known as:  ZyPREXA Take 5 mg by mouth nightly. OTHER Ceftazidime 1 gram every 8 hours by PICC line for 10 days  
  
 pilocarpine 5 mg tablet Commonly known as:  SALAGEN (PILOCARPINE) Take 1 Tab by mouth two (2) times a day. traZODone 50 mg tablet Commonly known as:  Olga Valenzuela  
 Take 50 mg by mouth nightly. VITAMIN D3 1,000 unit Cap Generic drug:  cholecalciferol Take  by mouth two (2) times a day. ZONALON, PRUDOXIN 5 % topical cream  
Commonly known as:  doxepin (ZONALON, PRUDOXIN) 5% cream  
APPLY EXTERNALLY TO THE AFFECTED AREA THREE TIMES DAILY * Notice: This list has 6 medication(s) that are the same as other medications prescribed for you. Read the directions carefully, and ask your doctor or other care provider to review them with you. Prescriptions Sent to Pharmacy Refills  
 levalbuterol (XOPENEX) 1.25 mg/3 mL nebu 0 Sig: 3 mL by Nebulization route now for 1 dose. Class: Normal  
 Pharmacy: [facility] [address], [address] [place] [address] #: [phone] Route: Nebulization  
 ipratropium (ATROVENT) 0.02 % soln 0 Si.5 mL by Nebulization route now for 1 dose. Class: Normal  
 Pharmacy: [facility] [address], [address] [place] [address] Ph #: [phone] Route: Nebulization We Performed the Following IPRATROPIUM BROMIDE NON-COMP [ [facility]] LEVALBUTEROL, INHAL. SOL., FDA-APPROVED FINAL, NON-COMPOUND UNIT DOSE, 0.5 MG [ [facility]] NE PRESSURIZED/NONPRESSURIZED INHALATION TREATMENT [id number] CPT(R)] NE PRESSURIZED/NONPRESSURIZED INHALATION TREATMENT [id number] CPT(R)] To-Do List   
 2018 Imaging:  XR CHEST PA LAT Introducing [facility]! Dear Chantal Mandujano: Thank you for requesting a ison [employer] account. Our records indicate that you already have an active ison [employer] account. You can access your account anytime at [email or web address] Did you know that you can access your hospital and ER discharge instructions at any time in ison furniture? You can also review all of your test results from your hospital stay or ER visit. Additional Information If you have questions, please visit the Frequently Asked Questions section of the [employer] website at [email or web address] Remember, [employer] is NOT to be used for urgent needs. For medical emergencies, dial 911. Now available from your iPhone and Android! Please provide this summary of care documentation to your next provider. Your primary care clinician is listed as Babetta Record. If you have any questions after today's visit, please call [phone].

## 2018-06-27 ENCOUNTER — TELEPHONE (OUTPATIENT)
Dept: FAMILY MEDICINE CLINIC | Age: 73
End: 2018-06-27

## 2018-06-27 NOTE — TELEPHONE ENCOUNTER
[facility] and to maintain it.  Wording they request is\"    \"picc care per home agency protocol\"     Fax [phone]

## 2018-07-13 ENCOUNTER — OFFICE VISIT (OUTPATIENT)
Dept: FAMILY MEDICINE CLINIC | Age: 73
End: 2018-07-13

## 2018-07-13 ENCOUNTER — HOSPITAL ENCOUNTER (OUTPATIENT)
Dept: LAB | Age: 73
Discharge: HOME | End: 2018-07-13
Payer: COMMERCIAL

## 2018-07-13 VITALS
DIASTOLIC BLOOD PRESSURE: 63 MMHG | TEMPERATURE: 97.4 F | HEART RATE: 93 BPM | SYSTOLIC BLOOD PRESSURE: 130 MMHG | RESPIRATION RATE: 18 BRPM | WEIGHT: 133 LBS | BODY MASS INDEX: 22.71 KG/M2 | OXYGEN SATURATION: 90 % | HEIGHT: 64 IN

## 2018-07-13 DIAGNOSIS — J15.1 PNEUMONIA DUE TO PSEUDOMONAS SPECIES, UNSPECIFIED LATERALITY, UNSPECIFIED PART OF LUNG (HCC): ICD-10-CM

## 2018-07-13 DIAGNOSIS — R05.3 CHRONIC COUGH: ICD-10-CM

## 2018-07-13 DIAGNOSIS — J44.1 COPD EXACERBATION (HCC): Primary | ICD-10-CM

## 2018-07-13 DIAGNOSIS — J44.1 COPD EXACERBATION (HCC): ICD-10-CM

## 2018-07-13 PROCEDURE — 87070 CULTURE OTHR SPECIMN AEROBIC: CPT | Performed by: INTERNAL MEDICINE

## 2018-07-13 PROCEDURE — 87186 SC STD MICRODIL/AGAR DIL: CPT | Performed by: INTERNAL MEDICINE

## 2018-07-13 PROCEDURE — 87077 CULTURE AEROBIC IDENTIFY: CPT | Performed by: INTERNAL MEDICINE

## 2018-07-13 RX ORDER — HYDROCODONE POLISTIREX AND CHLORPHENIRAMINE POLISTIREX 10; 8 MG/5ML; MG/5ML
5 SUSPENSION, EXTENDED RELEASE ORAL
Qty: 120 ML | Refills: 0 | Status: SHIPPED | OUTPATIENT
Start: 2018-07-13 | End: 2018-08-17

## 2018-07-13 RX ORDER — LEVALBUTEROL INHALATION SOLUTION 1.25 MG/3ML
1.25 SOLUTION RESPIRATORY (INHALATION)
Qty: 3 ML | Refills: 0
Start: 2018-07-13 | End: 2018-07-13

## 2018-07-13 RX ORDER — IPRATROPIUM BROMIDE 0.5 MG/2.5ML
0.5 SOLUTION RESPIRATORY (INHALATION)
Qty: 2.5 ML | Refills: 0
Start: 2018-07-13 | End: 2018-07-13

## 2018-07-13 NOTE — PROGRESS NOTES
Chelsea Tao is a 68 y.o. female (: 1945) presenting to address:    Chief Complaint   Patient presents with    Pneumonia     follow up pain scale 0/10       Vitals:    18 1336   BP: 130/63   Pulse: 93   Resp: 18   Temp: 97.4 °F (36.3 °C)   TempSrc: Oral   SpO2: 90%   Weight: 133 lb (60.3 kg)   Height: 5' 4\" (1.626 m)   PainSc:   0 - No pain       Hearing/Vision:   No exam data present    Learning Assessment:     Learning Assessment 2014   PRIMARY LEARNER Patient   HIGHEST LEVEL OF EDUCATION - PRIMARY LEARNER  GRADUATED HIGH SCHOOL OR GED   BARRIERS PRIMARY LEARNER NONE   CO-LEARNER CAREGIVER No   PRIMARY LANGUAGE ENGLISH   LEARNER PREFERENCE PRIMARY OTHER (COMMENT)   ANSWERED BY Patient   RELATIONSHIP SELF     Depression Screening:     PHQ over the last two weeks 3/4/2014   Little interest or pleasure in doing things Nearly every day   Feeling down, depressed or hopeless Nearly every day   Total Score PHQ 2 6   Trouble falling or staying asleep, or sleeping too much Nearly every day   Feeling tired or having little energy Not at all   Poor appetite or overeating Several days   Feeling bad about yourself - or that you are a failure or have let yourself or your family down Nearly every day   Trouble concentrating on things such as school, work, reading or watching TV Nearly every day   Moving or speaking so slowly that other people could have noticed; or the opposite being so fidgety that others notice Nearly every day   Thoughts of being better off dead, or hurting yourself in some way Nearly every day   How difficult have these problems made it for you to do your work, take care of your home and get along with others Extremely difficult     Fall Risk Assessment:     Fall Risk Assessment, last 12 mths 2018   Able to walk? Yes   Fall in past 12 months?  No   Fall with injury? -   Number of falls in past 12 months -   Fall Risk Score -     Abuse Screening:   No flowsheet data found. Coordination of Care Questionaire:   1. Have you been to the ER, urgent care clinic since your last visit? Hospitalized since your last visit? NO    2. Have you seen or consulted any other health care providers outside of the [address] [place] [address] since your last visit? Include any pap smears or colon screening. YES Dr. Magalie Dominguez    Advanced Directive:   1. Do you have an Advanced Directive? YES    2. Would you like information on Advanced Directives?  NO

## 2018-07-13 NOTE — PROGRESS NOTES
HISTORY OF PRESENT ILLNESS  Milady Baron is a 68 y.o. female. HPI  Copd stable  Pneumonia improved  Review of Systems   Respiratory: Positive for shortness of breath. All other systems reviewed and are negative. Past Medical History:   Diagnosis Date    Arm DVT (deep venous thromboembolism), acute, right (HCC) 4/10/2018    Bronchiolitis     Closed fracture of orbit with routine healing 4/10/2018    Cough     GERD (gastroesophageal reflux disease)     went for surgery for it    Hypertension     [facility]RA (juvenile rheumatoid arthritis) ([facility]     Orbital fracture, closed, initial encounter ([facility] 4/10/2018    Osteomyelitis of left humerus ([facility] 4/10/2018     Current Outpatient Prescriptions on File Prior to Visit   Medication Sig Dispense Refill    OTHER PICC care per home care protocol 1 Each 0    OTHER Ceftazidime 1 gram every 8 hours by PICC line for 10 days 90 Tab 1    methylPREDNISolone (MEDROL DOSEPACK) 4 mg tablet Start on Wednesday 1 Dose Pack 0    chlorpheniramine-HYDROcodone (TUSSIONEX) 10-8 mg/5 mL suspension Take 5 mL by mouth every twelve (12) hours as needed for Cough. Max Daily Amount: 10 mL. 120 mL 0    levothyroxine (SYNTHROID) 100 mcg tablet Take 1 Tab by mouth Daily (before breakfast). 90 Tab 3    methylPREDNISolone (MEDROL DOSEPACK) 4 mg tablet Start Thursday 1 Dose Pack 0    hydrOXYzine HCl (ATARAX) 50 mg tablet Take 1 Tab by mouth every eight (8) hours as needed. itching 60 Tab 1    ZONALON, PRUDOXIN (DOXEPIN, ZONALON, PRUDOXIN, 5% CREAM) 5 % topical cream APPLY EXTERNALLY TO THE AFFECTED AREA THREE TIMES DAILY 45 g 0    naloxone 2 mg/actuation spry Use 1 spray intranasally into 1 nostril. Use a new Narcan nasal spray for subsequent doses and administer into alternating nostrils. May repeat every 2 to 3 minutes as needed. 1 Device 0    benzonatate (TESSALON) 100 mg capsule Take 1 Cap by mouth every eight (8) hours as needed.  30 Cap 0    pilocarpine (SALAGEN, PILOCARPINE,) 5 mg tablet Take 1 Tab by mouth two (2) times a day. 60 Tab 0    metoprolol tartrate (LOPRESSOR) 25 mg tablet Take 1 Tab by mouth two (2) times a day. 180 Tab 0    guaiFENesin (ROBITUSSIN) 100 mg/5 mL liquid Take 100 mg by mouth every six (6) hours as needed.  latanoprost (XALATAN) 0.005 % ophthalmic solution Administer 1 Drop to both eyes nightly.  OLANZapine (ZYPREXA) 5 mg tablet Take 5 mg by mouth nightly.  traZODone (DESYREL) 50 mg tablet Take 50 mg by mouth nightly.  HYDROcodone-acetaminophen (NORCO)  mg tablet Take 1 Tab by mouth every eight (8) hours as needed.  budesonide (PULMICORT) 1 mg/2 mL nbsp 2 mL by Nebulization route two (2) times a day. 60 Each 1    chlorpheniramine-HYDROcodone (TUSSIONEX) 10-8 mg/5 mL suspension Take 5 mL by mouth every twelve (12) hours as needed for Cough. Max Daily Amount: 10 mL. 200 mL 0    acetylcysteine (MUCOMYST) 100 mg/mL (10 %) nebulizer solution Take 4 mL by inhalation two (2) times a day. 120 mL 0    albuterol-ipratropium (DUO-NEB) 2.5 mg-0.5 mg/3 ml nebu 3 mL by Nebulization route every six (6) hours as needed. Use 1 vial t.i.d. Via nebulizer prn 360 Nebule 1    clonazePAM (KLONOPIN) 0.5 mg tablet Take 1 Tab by mouth two (2) times a day. Max Daily Amount: 1 mg. 30 Tab 0    cholecalciferol (VITAMIN D3) 1,000 unit cap Take  by mouth two (2) times a day.  budesonide (PULMICORT) 0.5 mg/2 mL nbsp 2 mL by Nebulization route two (2) times a day. (Patient taking differently: 500 mcg by Nebulization route two (2) times daily as needed.) 60 Each 3    Calcium Carbonate-Vit D3-Min (CALTRATE 600+D PLUS MINERALS) 600 mg calcium- 400 unit Tab Take  by mouth daily.  multivitamins-minerals-lutein (CENTRUM SILVER) Tab Take  by mouth daily. No current facility-administered medications on file prior to visit.       Visit Vitals    /63 (BP 1 Location: Right arm, BP Patient Position: Sitting)    Pulse 93    Temp 97.4 °F (36.3 °C) (Oral)    Resp 18    Ht 5' 4\" (1.626 m)    Wt 133 lb (60.3 kg)    SpO2 90%    BMI 22.83 kg/m2         Physical Exam   Constitutional: She appears well-developed and well-nourished. No distress. Cardiovascular: Normal rate, regular rhythm, normal heart sounds and intact distal pulses. Exam reveals no gallop and no friction rub. No murmur heard. Pulmonary/Chest: Effort normal. No respiratory distress. She has wheezes. She has no rales. She exhibits no tenderness. Insp/exp wheezing all lung fields   Musculoskeletal: Normal range of motion. She exhibits no edema, tenderness or deformity. Vitals reviewed.   cxr- mild improve,ent in lung parenchyma    ASSESSMENT and PLAN  pseudomonas pneumonia   Plan  n  Sputum cx  tussionex

## 2018-07-13 NOTE — MR AVS SNAPSHOT
Ivett Florez 
 
 
 [address] [address] [facility] [address] [phone] Patient: Audra Graf MRN: [id number] Visit Information Date & Time Provider Department Dept. Phone Encounter #  
 7/13/2018  1:30 PM Marissa Coyne, [employer] [phone] [id number] Follow-up Instructions Return in about 1 month (around 8/13/2018), or pending culture. Follow-up and Disposition History Upcoming Health Maintenance Date Due Hepatitis C Screening 1945 COLONOSCOPY 5/2/1963 ZOSTER VACCINE AGE 60> 3/2/2005 Influenza Age 5 to Adult 8/1/2018 BREAST CANCER SCRN MAMMOGRAM 6/13/2019 DTaP/Tdap/Td series (2 - Td) 3/4/2020 GLAUCOMA SCREENING Q2Y 4/30/2020 Allergies as of 7/13/2018  Review Complete On: 7/13/2018 By: Marissa Coyne MD  
  
 Severity Noted Reaction Type Reactions Amoxicillin  08/23/2010    Unknown (comments) Avelox [Moxifloxacin]  08/23/2010    Unknown (comments) Celexa [Citalopram]  08/23/2010    Unknown (comments) Ciprofloxacin  08/23/2010    Unknown (comments) Doxycycline  01/17/2017    Nausea and Vomiting Levaquin [Levofloxacin]  08/23/2010    Unknown (comments) Paxil [Paroxetine Hcl]  08/23/2010    Unknown (comments) Sulfa (Sulfonamide Antibiotics)  08/23/2010    Unknown (comments) Joints ache Tequin [Gatifloxacin]  08/23/2010    Unknown (comments) Current Immunizations  Reviewed on 10/2/2017 Name Date Influenza High Dose Vaccine PF 10/2/2017 11:24 AM, 10/31/2016  2:06 PM, 10/20/2015 Influenza Vaccine 10/2/2017 12:00 AM, 10/22/2013, 9/1/2009 12:00 AM  
 Influenza Vaccine (Quad) 11/24/2014 12:17 PM  
 Influenza Vaccine Split 11/16/2011 Pneumococcal Polysaccharide (PPSV-23) 8/1/2013 12:00 AM  
 Tdap 3/4/2010 12:00 AM  
  
 Not reviewed this visit You Were Diagnosed With   
  
 Codes Comments COPD exacerbation ([facility]    -  Primary ICD-10-CM: J44.1 ICD-9-CM: 491.21 Pneumonia due to Pseudomonas species, unspecified laterality, unspecified part of lung ([facility]     ICD-10-CM: J15.1 ICD-9-CM: 984. 1 Chronic cough     ICD-10-CM: R05 ICD-9-CM: 959. 2 Vitals BP Pulse Temp Resp Height(growth percentile) Weight(growth percentile) 130/63 (BP 1 Location: Right arm, BP Patient Position: Sitting) 93 97.4 °F (36.3 °C) (Oral) 18 5' 4\" (1.626 m) 133 lb (60.3 kg) SpO2 BMI OB Status Smoking Status 90% 22.83 kg/m2 Postmenopausal Never Smoker BMI and BSA Data Body Mass Index Body Surface Area  
 22.83 kg/m 2 1.65 m 2 Preferred Pharmacy Pharmacy Name Phone [place] [address] [address], [address] [phone] Your Updated Medication List  
  
   
This list is accurate as of 7/13/18  3:10 PM.  Always use your most recent med list.  
  
  
  
  
 acetylcysteine 100 mg/mL (10 %) nebulizer solution Commonly known as:  MUCOMYST Take 4 mL by inhalation two (2) times a day. albuterol-ipratropium 2.5 mg-0.5 mg/3 ml Nebu Commonly known as:  DUO-NEB  
3 mL by Nebulization route every six (6) hours as needed. Use 1 vial t.i.d. Via nebulizer prn  
  
 benzonatate 100 mg capsule Commonly known as:  TESSALON Take 1 Cap by mouth every eight (8) hours as needed. * budesonide 0.5 mg/2 mL Nbsp Commonly known as:  PULMICORT  
2 mL by Nebulization route two (2) times a day. * budesonide 1 mg/2 mL Nbsp Commonly known as:  PULMICORT  
2 mL by Nebulization route two (2) times a day. CALTRATE 600+D PLUS MINERALS 600 mg calcium- 400 unit Tab Generic drug:  Calcium Carbonate-Vit D3-Min Take  by mouth daily. CENTRUM SILVER Tab tablet Generic drug:  multivitamins-minerals-lutein Take  by mouth daily. * chlorpheniramine-HYDROcodone 10-8 mg/5 mL suspension Commonly known as:  Yinka Saas Take 5 mL by mouth every twelve (12) hours as needed for Cough. Max Daily Amount: 10 mL. * chlorpheniramine-HYDROcodone 10-8 mg/5 mL suspension Commonly known as:  Yinka Saas Take 5 mL by mouth every twelve (12) hours as needed for Cough. Max Daily Amount: 10 mL. clonazePAM 0.5 mg tablet Commonly known as:  Soha Hayes Take 1 Tab by mouth two (2) times a day. Max Daily Amount: 1 mg.  
  
 guaiFENesin 100 mg/5 mL liquid Commonly known as:  ROBITUSSIN Take 100 mg by mouth every six (6) hours as needed. HYDROcodone-acetaminophen  mg tablet Commonly known as:  Birda Silverpeak Take 1 Tab by mouth every eight (8) hours as needed. hydrOXYzine HCl 50 mg tablet Commonly known as:  ATARAX Take 1 Tab by mouth every eight (8) hours as needed. itching  
  
 ipratropium 0.02 % Soln Commonly known as:  ATROVENT  
2.5 mL by Nebulization route now for 1 dose. latanoprost 0.005 % ophthalmic solution Commonly known as:  Kathleen Furbish Administer 1 Drop to both eyes nightly. levalbuterol 1.25 mg/3 mL Nebu Commonly known as:  XOPENEX  
3 mL by Nebulization route now for 1 dose. levothyroxine 100 mcg tablet Commonly known as:  SYNTHROID Take 1 Tab by mouth Daily (before breakfast). * methylPREDNISolone 4 mg tablet Commonly known as:  Cleta Loss Start Thursday * methylPREDNISolone 4 mg tablet Commonly known as:  Cleta Loss Start on Wednesday  
  
 metoprolol tartrate 25 mg tablet Commonly known as:  LOPRESSOR Take 1 Tab by mouth two (2) times a day.  
  
 naloxone 2 mg/actuation Spry Use 1 spray intranasally into 1 nostril. Use a new Narcan nasal spray for subsequent doses and administer into alternating nostrils. May repeat every 2 to 3 minutes as needed. OLANZapine 5 mg tablet Commonly known as:  ZyPREXA Take 5 mg by mouth nightly.   
  
 OTHER  
 Ceftazidime 1 gram every 8 hours by PICC line for 10 days OTHER  
PICC care per home care protocol  
  
 pilocarpine 5 mg tablet Commonly known as:  SALAGEN (PILOCARPINE) Take 1 Tab by mouth two (2) times a day. traZODone 50 mg tablet Commonly known as:  Lindsay Au Take 50 mg by mouth nightly. VITAMIN D3 1,000 unit Cap Generic drug:  cholecalciferol Take  by mouth two (2) times a day. ZONALON, PRUDOXIN 5 % topical cream  
Commonly known as:  doxepin (ZONALON, PRUDOXIN) 5% cream  
APPLY EXTERNALLY TO THE AFFECTED AREA THREE TIMES DAILY * Notice: This list has 6 medication(s) that are the same as other medications prescribed for you. Read the directions carefully, and ask your doctor or other care provider to review them with you. Prescriptions Printed Refills  
 chlorpheniramine-HYDROcodone (TUSSIONEX) 10-8 mg/5 mL suspension 0 Sig: Take 5 mL by mouth every twelve (12) hours as needed for Cough. Max Daily Amount: 10 mL. Class: Print Route: Oral  
  
We Performed the Following INHAL RX, AIRWAY OBST/DX SPUTUM INDUCT [id number] CPT(R)] IPRATROPIUM BROMIDE NON-COMP [ [facility]] LEVALBUTEROL, INHAL. SOL., FDA-APPROVED FINAL, NON-COMPOUND UNIT DOSE, 0.5 MG [ [facility]] LA PRESSURIZED/NONPRESSURIZED INHALATION TREATMENT [id number] CPT(R)] LA PRESSURIZED/NONPRESSURIZED INHALATION TREATMENT [id number] CPT(R)] Follow-up Instructions Return in about 1 month (around 8/13/2018), or pending culture. To-Do List   
 07/13/2018 Microbiology:  CULTURE, RESPIRATORY/SPUTUM/BRONCH Mary Beyer STAIN   
  
 07/13/2018 Imaging:  XR CHEST PA LAT Introducing [facility]! Dear Livia Chanel: Thank you for requesting a [employer] account. Our records indicate that you already have an active [employer] account. You can access your account anytime at [email or web address] Did you know that you can access your hospital and ER discharge instructions at any time in [employer]? You can also review all of your test results from your hospital stay or ER visit. Additional Information If you have questions, please visit the Frequently Asked Questions section of the [employer] website at [email or web address] Remember, [employer] is NOT to be used for urgent needs. For medical emergencies, dial 911. Now available from your iPhone and Android! Please provide this summary of care documentation to your next provider. Your primary care clinician is listed as Lisa Galloway. If you have any questions after today's visit, please call [phone].

## 2018-07-16 LAB
BACTERIA SPEC CULT: ABNORMAL
GRAM STN SPEC: ABNORMAL
SERVICE CMNT-IMP: ABNORMAL

## 2018-07-18 ENCOUNTER — TELEPHONE (OUTPATIENT)
Dept: FAMILY MEDICINE CLINIC | Age: 73
End: 2018-07-18

## 2018-07-23 ENCOUNTER — TELEPHONE (OUTPATIENT)
Dept: FAMILY MEDICINE CLINIC | Age: 73
End: 2018-07-23

## 2018-07-23 DIAGNOSIS — J11.00 INFLUENZA AND PNEUMONIA: Primary | ICD-10-CM

## 2018-07-24 ENCOUNTER — DOCUMENTATION ONLY (OUTPATIENT)
Dept: FAMILY MEDICINE CLINIC | Age: 73
End: 2018-07-24

## 2018-07-24 NOTE — TELEPHONE ENCOUNTER
We need to send and order to DC the picc line to [facility].    Please print

## 2018-08-03 ENCOUNTER — TELEPHONE (OUTPATIENT)
Dept: FAMILY MEDICINE CLINIC | Age: 73
End: 2018-08-03

## 2018-08-03 NOTE — TELEPHONE ENCOUNTER
Esther Coffman from [facility] home health called stating the pt has been discharged and has a follow up with you on 8/17/18

## 2018-08-17 ENCOUNTER — OFFICE VISIT (OUTPATIENT)
Dept: FAMILY MEDICINE CLINIC | Age: 73
End: 2018-08-17

## 2018-08-17 VITALS
TEMPERATURE: 98.3 F | HEART RATE: 90 BPM | HEIGHT: 64 IN | WEIGHT: 126.8 LBS | BODY MASS INDEX: 21.65 KG/M2 | OXYGEN SATURATION: 89 % | RESPIRATION RATE: 16 BRPM | SYSTOLIC BLOOD PRESSURE: 124 MMHG | DIASTOLIC BLOOD PRESSURE: 72 MMHG

## 2018-08-17 DIAGNOSIS — F11.20 UNCOMPLICATED OPIOID DEPENDENCE (HCC): ICD-10-CM

## 2018-08-17 DIAGNOSIS — Z87.09 HISTORY OF ACUTE RESPIRATORY FAILURE: ICD-10-CM

## 2018-08-17 DIAGNOSIS — Z87.01 HISTORY OF PNEUMONIA: Primary | ICD-10-CM

## 2018-08-17 DIAGNOSIS — R09.02 HYPOXIC: ICD-10-CM

## 2018-08-17 NOTE — PROGRESS NOTES
Layne Noyola is a 68 y.o. female here for follow up       Layne Noyola is a 68 y.o. female (: 1945) presenting to address:    Chief Complaint   Patient presents with    Follow-up     pt here for follow up. seen for pneumonia last visit       Vitals:    18 1340   BP: 124/72   Pulse: 90   Resp: 16   Temp: 98.3 °F (36.8 °C)   TempSrc: Oral   SpO2: (!) 89%   Weight: 126 lb 12.8 oz (57.5 kg)   Height: 5' 4\" (1.626 m)   PainSc:   0 - No pain       Hearing/Vision:   No exam data present    Learning Assessment:     Learning Assessment 2014   PRIMARY LEARNER Patient   HIGHEST LEVEL OF EDUCATION - PRIMARY LEARNER  GRADUATED HIGH SCHOOL OR GED   BARRIERS PRIMARY LEARNER NONE   CO-LEARNER CAREGIVER No   PRIMARY LANGUAGE ENGLISH   LEARNER PREFERENCE PRIMARY OTHER (COMMENT)   ANSWERED BY Patient   RELATIONSHIP SELF     Depression Screening:     PHQ over the last two weeks 3/4/2014   Little interest or pleasure in doing things Nearly every day   Feeling down, depressed, irritable, or hopeless Nearly every day   Total Score PHQ 2 6   Trouble falling or staying asleep, or sleeping too much Nearly every day   Feeling tired or having little energy Not at all   Poor appetite, weight loss, or overeating Several days   Feeling bad about yourself - or that you are a failure or have let yourself or your family down Nearly every day   Trouble concentrating on things such as school, work, reading, or watching TV Nearly every day   Moving or speaking so slowly that other people could have noticed; or the opposite being so fidgety that others notice Nearly every day   Thoughts of being better off dead, or hurting yourself in some way Nearly every day   How difficult have these problems made it for you to do your work, take care of your home and get along with others Extremely difficult     Fall Risk Assessment:     Fall Risk Assessment, last 12 mths 2018   Able to walk? Yes   Fall in past 12 months?  No   Fall with injury? -   Number of falls in past 12 months -   Fall Risk Score -     Abuse Screening:   No flowsheet data found. Coordination of Care Questionaire:   1. Have you been to the ER, urgent care clinic since your last visit? Hospitalized since your last visit? NO    2. Have you seen or consulted any other health care providers outside of the [address] [place] [address] since your last visit? Include any pap smears or colon screening. YES [facility]    Advanced Directive:   1. Do you have an Advanced Directive? No    2. Would you like information on Advanced Directives?  NO

## 2018-08-17 NOTE — PROGRESS NOTES
HISTORY OF PRESENT ILLNESS  Princess Hercules is a 68 y.o. female. HPI Comments: Patient's  reports home SpO2 measurement dropping to 78% last night, gave nebulizer treatments \"almost called the rescue squad\". Follow-up   The history is provided by the patient and medical records. 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 , Disp: 90 Tab, Rfl: 3    hydrOXYzine HCl (ATARAX) 50 mg tablet, Take 1 Tab by mouth every eight (8) hours as needed. itching, Disp: 60 Tab, Rfl: 1    ZONALON, PRUDOXIN (DOXEPIN, ZONALON, PRUDOXIN, 5% CREAM) 5 % topical cream, APPLY EXTERNALLY TO THE AFFECTED AREA THREE TIMES DAILY, Disp: 45 g, Rfl: 0    naloxone 2 mg/actuation spry, Use 1 spray intranasally into 1 nostril. Use a new Narcan nasal spray for subsequent doses and administer into alternating nostrils. May repeat every 2 to 3 minutes as needed. , Disp: 1 Device, Rfl: 0    pilocarpine (SALAGEN, PILOCARPINE,) 5 mg tablet, Take 1 Tab by mouth two (2) times a day., Disp: 60 Tab, Rfl: 0    metoprolol tartrate (LOPRESSOR) 25 mg tablet, Take 1 Tab by mouth two (2) times a day., Disp: 180 Tab, Rfl: 0    latanoprost (XALATAN) 0.005 % ophthalmic solution, Administer 1 Drop to both eyes nightly., Disp: , Rfl:     OLANZapine (ZYPREXA) 5 mg tablet, Take 5 mg by mouth nightly., Disp: , Rfl:     traZODone (DESYREL) 50 mg tablet, Take 50 mg by mouth nightly., Disp: , Rfl:     HYDROcodone-acetaminophen (NORCO)  mg tablet, Take 1 Tab by mouth every eight (8) hours as needed. , Disp: , Rfl:     budesonide (PULMICORT) 1 mg/2 mL nbsp, 2 mL by Nebulization route two (2) times a day., Disp: 60 Each, Rfl: 1    acetylcysteine (MUCOMYST) 100 mg/mL (10 %) nebulizer solution, Take 4 mL by inhalation two (2) times a day., Disp: 120 mL, Rfl: 0    albuterol-ipratropium (DUO-NEB) 2.5 mg-0.5 mg/3 ml nebu, 3 mL by Nebulization route every six (6) hours as needed. Use 1 vial t.i.d. Via nebulizer prn, Disp: 360 Nebule, Rfl: 1    clonazePAM (KLONOPIN) 0.5 mg tablet, Take 1 Tab by mouth two (2) times a day. Max Daily Amount: 1 mg., Disp: 30 Tab, Rfl: 0    cholecalciferol (VITAMIN D3) 1,000 unit cap, Take  by mouth two (2) times a day., Disp: , Rfl:     budesonide (PULMICORT) 0.5 mg/2 mL nbsp, 2 mL by Nebulization route two (2) times a day. (Patient taking differently: 500 mcg by Nebulization route two (2) times daily as needed.), Disp: 60 Each, Rfl: 3    Calcium Carbonate-Vit D3-Min (CALTRATE 600+D PLUS MINERALS) 600 mg calcium- 400 unit Tab, Take  by mouth daily. , Disp: , Rfl:     multivitamins-minerals-lutein (CENTRUM SILVER) Tab, Take  by mouth daily. , Disp: , Rfl:       ROS  Visit Vitals    /72 (BP 1 Location: Left arm, BP Patient Position: Sitting)    Pulse 90    Temp 98.3 °F (36.8 °C) (Oral)    Resp 16    Ht 5' 4\" (1.626 m)    Wt 126 lb 12.8 oz (57.5 kg)    SpO2 (!) 89%    BMI 21.77 kg/m2     Physical Exam   Constitutional: She is oriented to person, place, and time. She appears well-developed and well-nourished. HENT:   Head: Normocephalic. Eyes: Conjunctivae and EOM are normal.   Neck: Neck supple. Cardiovascular: Normal rate, regular rhythm and normal heart sounds. Pulmonary/Chest: Effort normal.   Diffuse wheezes, ronchi   Musculoskeletal: She exhibits no edema. Neurological: She is alert and oriented to person, place, and time. Skin: Skin is warm and dry. Psychiatric: Her behavior is normal.   Appears mildly obtunded   Nursing note and vitals reviewed. Final result (Exam End: 5/29/2018  8:35 AM)  IMPRESSION:  Reticular and patchy infiltrate in the right upper lobe and middle lobe and  likely lingula, likely pneumonia. Recommend follow-up chest x-ray to document  Resolution. Final result (Exam End: 7/13/2018  2:14 PM)  IMPRESSION:  1. Decreased opacification of the right lung consistent with resolving  Pneumonia. Final result (Exam End: 7/23/2018 11:35 AM)  IMPRESSION:  1. Persistent right upper and right lower lobe infiltrates which have not  significantly improved since previous exam of 7/13/2018 are definitely improved  when compared to 5/29/2018. Recommend continued follow-up to resolution. PA and lateral Chest today: no apparent significant improvement compared with previous study from 7/23/2018  - radiology interpretation pending    ASSESSMENT and PLAN    ICD-10-CM ICD-9-CM    1. History of pneumonia Z87.01 V12.61 XR CHEST PA LAT   2. Hypoxic R09.02 799.02    3. History of acute respiratory failure Z87.09 V12.69    4.  Uncomplicated opioid dependence ([facility] F11.20 304.00    Chronic lung disease with infiltrates from 3 months ago not resolved  Low SpO2 with Hx of acute respiratory failure  Chronically sedated with narcotic multiple other sedating medications  Recommend ED evaluation

## 2021-07-09 NOTE — TELEPHONE ENCOUNTER
[facility] called to inform us that the pt is getting d/c from home health today. Since she is being d/c they will no longer be monitoring her pt inr, fyi.  If you have any questions please call at [phone]
Schedule to come here for the next inr
Scheduled for 6-13-17, do you want to see her before then?
no
45.3